# Patient Record
Sex: MALE | Race: WHITE | NOT HISPANIC OR LATINO
[De-identification: names, ages, dates, MRNs, and addresses within clinical notes are randomized per-mention and may not be internally consistent; named-entity substitution may affect disease eponyms.]

---

## 2016-11-07 RX ORDER — DILTIAZEM HCL 120 MG
1 CAPSULE, EXT RELEASE 24 HR ORAL
Qty: 120 | Refills: 0
Start: 2016-11-07 | End: 2017-10-12

## 2017-01-08 LAB — INR PPP: 1.9

## 2017-01-17 LAB — INR PPP: 1.82

## 2017-01-20 DIAGNOSIS — F32.9 MAJOR DEPRESSIVE DISORDER, SINGLE EPISODE, UNSPECIFIED: ICD-10-CM

## 2017-01-31 LAB — INR PPP: 2.89

## 2017-02-15 LAB — INR PPP: 2.51

## 2017-03-08 LAB — INR PPP: 3.1

## 2017-03-22 LAB — INR PPP: 2.28

## 2017-04-14 LAB — INR PPP: 1.88

## 2017-04-28 LAB — INR PPP: 2.47

## 2017-05-10 ENCOUNTER — MEDICATION RENEWAL (OUTPATIENT)
Age: 82
End: 2017-05-10

## 2017-05-19 LAB — INR PPP: 2.7

## 2017-05-26 ENCOUNTER — MEDICATION RENEWAL (OUTPATIENT)
Age: 82
End: 2017-05-26

## 2017-06-01 ENCOUNTER — APPOINTMENT (OUTPATIENT)
Dept: CARDIOLOGY | Facility: CLINIC | Age: 82
End: 2017-06-01

## 2017-06-01 VITALS — OXYGEN SATURATION: 100 % | SYSTOLIC BLOOD PRESSURE: 166 MMHG | HEART RATE: 76 BPM | DIASTOLIC BLOOD PRESSURE: 77 MMHG

## 2017-06-02 DIAGNOSIS — D64.9 ANEMIA, UNSPECIFIED: ICD-10-CM

## 2017-06-02 LAB
ALBUMIN SERPL ELPH-MCNC: 3.4 G/DL
ALP BLD-CCNC: 74 U/L
ALT SERPL-CCNC: 10 U/L
ANION GAP SERPL CALC-SCNC: 19 MMOL/L
AST SERPL-CCNC: 20 U/L
BASOPHILS # BLD AUTO: 0.03 K/UL
BASOPHILS NFR BLD AUTO: 0.5 %
BILIRUB SERPL-MCNC: 0.2 MG/DL
BUN SERPL-MCNC: 55 MG/DL
CALCIUM SERPL-MCNC: 8.9 MG/DL
CHLORIDE SERPL-SCNC: 101 MMOL/L
CHOLEST SERPL-MCNC: 208 MG/DL
CHOLEST/HDLC SERPL: 4.1 RATIO
CO2 SERPL-SCNC: 22 MMOL/L
CREAT SERPL-MCNC: 3.12 MG/DL
EOSINOPHIL # BLD AUTO: 0.22 K/UL
EOSINOPHIL NFR BLD AUTO: 3.6 %
GLUCOSE SERPL-MCNC: 77 MG/DL
HCT VFR BLD CALC: 31.6 %
HDLC SERPL-MCNC: 51 MG/DL
HGB BLD-MCNC: 10.3 G/DL
IMM GRANULOCYTES NFR BLD AUTO: 0.2 %
LDLC SERPL CALC-MCNC: 127 MG/DL
LYMPHOCYTES # BLD AUTO: 1.14 K/UL
LYMPHOCYTES NFR BLD AUTO: 18.8 %
MAN DIFF?: NORMAL
MCHC RBC-ENTMCNC: 32.6 GM/DL
MCHC RBC-ENTMCNC: 33 PG
MCV RBC AUTO: 101.3 FL
MONOCYTES # BLD AUTO: 0.4 K/UL
MONOCYTES NFR BLD AUTO: 6.6 %
NEUTROPHILS # BLD AUTO: 4.26 K/UL
NEUTROPHILS NFR BLD AUTO: 70.3 %
PLATELET # BLD AUTO: 228 K/UL
POTASSIUM SERPL-SCNC: 3.8 MMOL/L
PROT SERPL-MCNC: 6.6 G/DL
RBC # BLD: 3.12 M/UL
RBC # FLD: 13.2 %
SODIUM SERPL-SCNC: 142 MMOL/L
TRIGL SERPL-MCNC: 148 MG/DL
TSH SERPL-ACNC: 1.74 UIU/ML
WBC # FLD AUTO: 6.06 K/UL

## 2017-06-17 LAB — INR PPP: 2.34

## 2017-06-26 ENCOUNTER — APPOINTMENT (OUTPATIENT)
Dept: CARDIOLOGY | Facility: CLINIC | Age: 82
End: 2017-06-26

## 2017-07-13 LAB — INR PPP: 2.26

## 2017-07-18 ENCOUNTER — CHART COPY (OUTPATIENT)
Age: 82
End: 2017-07-18

## 2017-07-31 LAB — INR PPP: 2.94

## 2017-08-03 ENCOUNTER — MEDICATION RENEWAL (OUTPATIENT)
Age: 82
End: 2017-08-03

## 2017-08-30 ENCOUNTER — NON-APPOINTMENT (OUTPATIENT)
Age: 82
End: 2017-08-30

## 2017-08-30 ENCOUNTER — APPOINTMENT (OUTPATIENT)
Dept: CARDIOLOGY | Facility: CLINIC | Age: 82
End: 2017-08-30
Payer: MEDICARE

## 2017-08-30 VITALS
HEIGHT: 65 IN | BODY MASS INDEX: 21.49 KG/M2 | OXYGEN SATURATION: 95 % | WEIGHT: 129 LBS | DIASTOLIC BLOOD PRESSURE: 74 MMHG | SYSTOLIC BLOOD PRESSURE: 133 MMHG | HEART RATE: 77 BPM

## 2017-08-30 PROCEDURE — 99215 OFFICE O/P EST HI 40 MIN: CPT

## 2017-09-08 ENCOUNTER — EMERGENCY (EMERGENCY)
Facility: HOSPITAL | Age: 82
LOS: 1 days | Discharge: HOME | End: 2017-09-08
Attending: INTERNAL MEDICINE | Admitting: INTERNAL MEDICINE
Payer: COMMERCIAL

## 2017-09-08 ENCOUNTER — CHART COPY (OUTPATIENT)
Age: 82
End: 2017-09-08

## 2017-09-08 ENCOUNTER — INPATIENT (INPATIENT)
Facility: HOSPITAL | Age: 82
LOS: 3 days | Discharge: HOME | DRG: 694 | End: 2017-09-12
Attending: INTERNAL MEDICINE | Admitting: STUDENT IN AN ORGANIZED HEALTH CARE EDUCATION/TRAINING PROGRAM
Payer: COMMERCIAL

## 2017-09-08 VITALS
DIASTOLIC BLOOD PRESSURE: 54 MMHG | HEIGHT: 65 IN | WEIGHT: 125 LBS | TEMPERATURE: 98 F | HEART RATE: 67 BPM | SYSTOLIC BLOOD PRESSURE: 117 MMHG | RESPIRATION RATE: 12 BRPM | OXYGEN SATURATION: 97 %

## 2017-09-08 VITALS
WEIGHT: 123.02 LBS | DIASTOLIC BLOOD PRESSURE: 67 MMHG | TEMPERATURE: 98 F | RESPIRATION RATE: 12 BRPM | OXYGEN SATURATION: 99 % | HEART RATE: 90 BPM | SYSTOLIC BLOOD PRESSURE: 147 MMHG

## 2017-09-08 DIAGNOSIS — N39.0 URINARY TRACT INFECTION, SITE NOT SPECIFIED: ICD-10-CM

## 2017-09-08 DIAGNOSIS — I50.22 CHRONIC SYSTOLIC (CONGESTIVE) HEART FAILURE: ICD-10-CM

## 2017-09-08 DIAGNOSIS — R33.9 RETENTION OF URINE, UNSPECIFIED: ICD-10-CM

## 2017-09-08 DIAGNOSIS — Z85.51 PERSONAL HISTORY OF MALIGNANT NEOPLASM OF BLADDER: ICD-10-CM

## 2017-09-08 DIAGNOSIS — F32.9 MAJOR DEPRESSIVE DISORDER, SINGLE EPISODE, UNSPECIFIED: ICD-10-CM

## 2017-09-08 DIAGNOSIS — N28.9 DISORDER OF KIDNEY AND URETER, UNSPECIFIED: ICD-10-CM

## 2017-09-08 DIAGNOSIS — C61 MALIGNANT NEOPLASM OF PROSTATE: ICD-10-CM

## 2017-09-08 DIAGNOSIS — N40.1 BENIGN PROSTATIC HYPERPLASIA WITH LOWER URINARY TRACT SYMPTOMS: ICD-10-CM

## 2017-09-08 DIAGNOSIS — I48.91 UNSPECIFIED ATRIAL FIBRILLATION: ICD-10-CM

## 2017-09-08 DIAGNOSIS — Z29.9 ENCOUNTER FOR PROPHYLACTIC MEASURES, UNSPECIFIED: ICD-10-CM

## 2017-09-08 DIAGNOSIS — N13.30 UNSPECIFIED HYDRONEPHROSIS: ICD-10-CM

## 2017-09-08 DIAGNOSIS — I50.9 HEART FAILURE, UNSPECIFIED: ICD-10-CM

## 2017-09-08 DIAGNOSIS — I48.2 CHRONIC ATRIAL FIBRILLATION: ICD-10-CM

## 2017-09-08 DIAGNOSIS — I10 ESSENTIAL (PRIMARY) HYPERTENSION: ICD-10-CM

## 2017-09-08 DIAGNOSIS — N17.9 ACUTE KIDNEY FAILURE, UNSPECIFIED: ICD-10-CM

## 2017-09-08 DIAGNOSIS — Z85.46 PERSONAL HISTORY OF MALIGNANT NEOPLASM OF PROSTATE: ICD-10-CM

## 2017-09-08 DIAGNOSIS — Z79.01 LONG TERM (CURRENT) USE OF ANTICOAGULANTS: ICD-10-CM

## 2017-09-08 DIAGNOSIS — T45.511A POISONING BY ANTICOAGULANTS, ACCIDENTAL (UNINTENTIONAL), INITIAL ENCOUNTER: ICD-10-CM

## 2017-09-08 DIAGNOSIS — R33.8 OTHER RETENTION OF URINE: ICD-10-CM

## 2017-09-08 DIAGNOSIS — M10.9 GOUT, UNSPECIFIED: ICD-10-CM

## 2017-09-08 LAB
ALBUMIN SERPL ELPH-MCNC: 3.1 G/DL — LOW (ref 3.3–5)
ALP SERPL-CCNC: 81 U/L — SIGNIFICANT CHANGE UP (ref 40–120)
ALT FLD-CCNC: 15 U/L — SIGNIFICANT CHANGE UP (ref 12–78)
ANION GAP SERPL CALC-SCNC: 13 MMOL/L — SIGNIFICANT CHANGE UP (ref 5–17)
APPEARANCE UR: ABNORMAL
APTT BLD: 42.9 SEC — HIGH (ref 27.5–37.4)
AST SERPL-CCNC: 20 U/L — SIGNIFICANT CHANGE UP (ref 15–37)
BASOPHILS # BLD AUTO: 0.1 K/UL — SIGNIFICANT CHANGE UP (ref 0–0.2)
BASOPHILS NFR BLD AUTO: 0.6 % — SIGNIFICANT CHANGE UP (ref 0–2)
BILIRUB SERPL-MCNC: 0.4 MG/DL — SIGNIFICANT CHANGE UP (ref 0.2–1.2)
BILIRUB UR-MCNC: NEGATIVE — SIGNIFICANT CHANGE UP
BUN SERPL-MCNC: 85 MG/DL — HIGH (ref 7–23)
CALCIUM SERPL-MCNC: 8.7 MG/DL — SIGNIFICANT CHANGE UP (ref 8.5–10.1)
CHLORIDE SERPL-SCNC: 105 MMOL/L — SIGNIFICANT CHANGE UP (ref 96–108)
CO2 SERPL-SCNC: 21 MMOL/L — LOW (ref 22–31)
COLOR SPEC: YELLOW — SIGNIFICANT CHANGE UP
CREAT SERPL-MCNC: 4.5 MG/DL — HIGH (ref 0.5–1.3)
DIFF PNL FLD: ABNORMAL
EOSINOPHIL # BLD AUTO: 0 K/UL — SIGNIFICANT CHANGE UP (ref 0–0.5)
EOSINOPHIL NFR BLD AUTO: 0.3 % — SIGNIFICANT CHANGE UP (ref 0–6)
GLUCOSE SERPL-MCNC: 100 MG/DL — HIGH (ref 70–99)
GLUCOSE UR QL: NEGATIVE — SIGNIFICANT CHANGE UP
HCT VFR BLD CALC: 33.2 % — LOW (ref 39–50)
HGB BLD-MCNC: 11.6 G/DL — LOW (ref 13–17)
INR BLD: 3.58 RATIO — HIGH (ref 0.88–1.16)
INR PPP: 3.11
KETONES UR-MCNC: NEGATIVE — SIGNIFICANT CHANGE UP
LACTATE SERPL-SCNC: 1.1 MMOL/L — SIGNIFICANT CHANGE UP (ref 0.7–2)
LEUKOCYTE ESTERASE UR-ACNC: ABNORMAL
LYMPHOCYTES # BLD AUTO: 0.4 K/UL — LOW (ref 1–3.3)
LYMPHOCYTES # BLD AUTO: 4.3 % — LOW (ref 13–44)
MCHC RBC-ENTMCNC: 34.2 PG — HIGH (ref 27–34)
MCHC RBC-ENTMCNC: 34.9 GM/DL — SIGNIFICANT CHANGE UP (ref 32–36)
MCV RBC AUTO: 98 FL — SIGNIFICANT CHANGE UP (ref 80–100)
MONOCYTES # BLD AUTO: 0.7 K/UL — SIGNIFICANT CHANGE UP (ref 0–0.9)
MONOCYTES NFR BLD AUTO: 7.2 % — SIGNIFICANT CHANGE UP (ref 1–9)
NEUTROPHILS # BLD AUTO: 8.5 K/UL — HIGH (ref 1.8–7.4)
NEUTROPHILS NFR BLD AUTO: 87.6 % — HIGH (ref 43–77)
NITRITE UR-MCNC: NEGATIVE — SIGNIFICANT CHANGE UP
PH UR: 5 — SIGNIFICANT CHANGE UP (ref 5–8)
PLATELET # BLD AUTO: 227 K/UL — SIGNIFICANT CHANGE UP (ref 150–400)
POTASSIUM SERPL-MCNC: 4.4 MMOL/L — SIGNIFICANT CHANGE UP (ref 3.5–5.3)
POTASSIUM SERPL-SCNC: 4.4 MMOL/L — SIGNIFICANT CHANGE UP (ref 3.5–5.3)
PROT SERPL-MCNC: 6.7 G/DL — SIGNIFICANT CHANGE UP (ref 6–8.3)
PROT UR-MCNC: 150 MG/DL
PROTHROM AB SERPL-ACNC: 40 SEC — HIGH (ref 9.8–12.7)
RBC # BLD: 3.39 M/UL — LOW (ref 4.2–5.8)
RBC # FLD: 11.7 % — SIGNIFICANT CHANGE UP (ref 10.3–14.5)
SODIUM SERPL-SCNC: 139 MMOL/L — SIGNIFICANT CHANGE UP (ref 135–145)
SP GR SPEC: 1.01 — SIGNIFICANT CHANGE UP (ref 1.01–1.02)
UROBILINOGEN FLD QL: NEGATIVE — SIGNIFICANT CHANGE UP
WBC # BLD: 9.8 K/UL — SIGNIFICANT CHANGE UP (ref 3.8–10.5)
WBC # FLD AUTO: 9.8 K/UL — SIGNIFICANT CHANGE UP (ref 3.8–10.5)

## 2017-09-08 PROCEDURE — 99284 EMERGENCY DEPT VISIT MOD MDM: CPT | Mod: 25

## 2017-09-08 PROCEDURE — 99223 1ST HOSP IP/OBS HIGH 75: CPT

## 2017-09-08 PROCEDURE — 87086 URINE CULTURE/COLONY COUNT: CPT

## 2017-09-08 PROCEDURE — 99223 1ST HOSP IP/OBS HIGH 75: CPT | Mod: AI

## 2017-09-08 PROCEDURE — 99284 EMERGENCY DEPT VISIT MOD MDM: CPT

## 2017-09-08 PROCEDURE — 51702 INSERT TEMP BLADDER CATH: CPT

## 2017-09-08 PROCEDURE — 71045 X-RAY EXAM CHEST 1 VIEW: CPT

## 2017-09-08 PROCEDURE — 93010 ELECTROCARDIOGRAM REPORT: CPT

## 2017-09-08 PROCEDURE — 99283 EMERGENCY DEPT VISIT LOW MDM: CPT | Mod: 25

## 2017-09-08 PROCEDURE — 81001 URINALYSIS AUTO W/SCOPE: CPT

## 2017-09-08 PROCEDURE — 71010: CPT | Mod: 26

## 2017-09-08 RX ORDER — TAMSULOSIN HYDROCHLORIDE 0.4 MG/1
0.4 CAPSULE ORAL AT BEDTIME
Qty: 0 | Refills: 0 | Status: DISCONTINUED | OUTPATIENT
Start: 2017-09-08 | End: 2017-09-11

## 2017-09-08 RX ORDER — CEFUROXIME AXETIL 250 MG
500 TABLET ORAL ONCE
Qty: 0 | Refills: 0 | Status: COMPLETED | OUTPATIENT
Start: 2017-09-08 | End: 2017-09-08

## 2017-09-08 RX ORDER — METOPROLOL TARTRATE 50 MG
50 TABLET ORAL
Qty: 0 | Refills: 0 | Status: DISCONTINUED | OUTPATIENT
Start: 2017-09-08 | End: 2017-09-09

## 2017-09-08 RX ORDER — CEFUROXIME AXETIL 250 MG
1 TABLET ORAL
Qty: 14 | Refills: 0 | OUTPATIENT
Start: 2017-09-08 | End: 2017-09-15

## 2017-09-08 RX ORDER — SERTRALINE 25 MG/1
50 TABLET, FILM COATED ORAL DAILY
Qty: 0 | Refills: 0 | Status: DISCONTINUED | OUTPATIENT
Start: 2017-09-08 | End: 2017-09-12

## 2017-09-08 RX ORDER — ACETAMINOPHEN 500 MG
650 TABLET ORAL EVERY 6 HOURS
Qty: 0 | Refills: 0 | Status: DISCONTINUED | OUTPATIENT
Start: 2017-09-08 | End: 2017-09-12

## 2017-09-08 RX ORDER — CEFTRIAXONE 500 MG/1
1 INJECTION, POWDER, FOR SOLUTION INTRAMUSCULAR; INTRAVENOUS EVERY 24 HOURS
Qty: 0 | Refills: 0 | Status: DISCONTINUED | OUTPATIENT
Start: 2017-09-08 | End: 2017-09-12

## 2017-09-08 RX ADMIN — Medication 50 MILLIGRAM(S): at 18:33

## 2017-09-08 RX ADMIN — Medication 500 MILLIGRAM(S): at 13:21

## 2017-09-08 RX ADMIN — TAMSULOSIN HYDROCHLORIDE 0.4 MILLIGRAM(S): 0.4 CAPSULE ORAL at 21:18

## 2017-09-08 RX ADMIN — CEFTRIAXONE 100 GRAM(S): 500 INJECTION, POWDER, FOR SOLUTION INTRAMUSCULAR; INTRAVENOUS at 17:20

## 2017-09-08 NOTE — H&P ADULT - NSHPSOCIALHISTORY_GEN_ALL_CORE
no tobacco, no etoh, no recreational drug use  ambulates w/ assistance in the home and has chronic gait instability  lives w/ spouse

## 2017-09-08 NOTE — PATIENT PROFILE ADULT. - ABILITY TO HEAR (WITH HEARING AID OR HEARING APPLIANCE IF NORMALLY USED):
Mildly to Moderately Impaired: difficulty hearing in some environments or speaker may need to increase volume or speak distinctly/bilateral hearing aids used not currently with pt

## 2017-09-08 NOTE — H&P ADULT - PROBLEM SELECTOR PLAN 1
admit medicine  cardio, Mary consulted by the ED  uroKem consulted by the ED  no supplemental IVF  obtain abd flat plate xr  f/u consultant recs  continue tamsulin  vega inserted, monitor urine output  monior bun/cre  avoid nephrotoxic agents

## 2017-09-08 NOTE — ED ADULT NURSE NOTE - OBJECTIVE STATEMENT
Seen here earlier for retention vega placed and abx given for uti/ called by by MD for admission for renal

## 2017-09-08 NOTE — ED PROVIDER NOTE - PMH
Acute congestive heart failure  12/2010  Atrial Fibrillation  1990  Bladder neoplasm    BPH (Benign Prostatic Hypertrophy)    Congestive heart failure  hospitalized  once 12/2010  Depression    Gout    [place] (hard of hearing)    Hyperlipidemia    Hypertension    Prostate cancer  9/2011 - s/p external radiation treatments - last 12/9/11, now on Bicalvutamide

## 2017-09-08 NOTE — H&P ADULT - ATTENDING COMMENTS
pt has previously elected for comfort care measure and DNR/DNI. However, the pt was hospitalized recently and had a syncopal episode. RRT was called and the wife believes that this saved the pt's life. She wishes for full code if necessary and the pt agrees. Counseling provided. Pt elects for full aggressive medical therapy and full code.     The admission plan was discussed in detail with the patient and family members at the bedside. Their questions and concerns were addressed to the best of my ability. They are in agreement with the plan as detailed above. They demonstrated adequate understanding of the counseling which I have provided.

## 2017-09-08 NOTE — PATIENT PROFILE ADULT. - VISION (WITH CORRECTIVE LENSES IF THE PATIENT USUALLY WEARS THEM):
Partially impaired: cannot see medication labels or newsprint, but can see obstacles in path, and the surrounding layout; can count fingers at arm's length/reading glasses, not currently with pt

## 2017-09-08 NOTE — ED ADULT NURSE NOTE - PMH
Acute congestive heart failure  12/2010  Atrial Fibrillation  1990  Bladder neoplasm    BPH (Benign Prostatic Hypertrophy)    Congestive heart failure  hospitalized  once 12/2010  Depression    Gout    [place] (hard of hearing)    Hyperlipidemia    Hypertension    Prostate cancer  9/2011 - s/p external radiation treatments - last 12/9/11, now on Bicalvutamide

## 2017-09-08 NOTE — PATIENT PROFILE ADULT. - NS PRO INDICAT FLU
Patient is 18 years or older/Patient is 18 to 64 years of age with chronic diseases or conditions/Patient/surrogate requesting vaccine

## 2017-09-08 NOTE — ED PROVIDER NOTE - MEDICAL DECISION MAKING DETAILS
urologist and pmd request that pt be admitted ...acute urinary retention in face of acute renal insuff.

## 2017-09-08 NOTE — H&P ADULT - PMH
Acute congestive heart failure  12/2010  Atrial Fibrillation  1990  Bladder neoplasm    Congestive heart failure  hospitalized  once 12/2010  Depression    Gout    [place] (hard of hearing)    Hyperlipidemia    Hypertension    Prostate cancer  9/2011 - s/p external radiation treatments - last 12/9/11, now on Bicalvutamide

## 2017-09-08 NOTE — ED PROVIDER NOTE - PMH
Acute congestive heart failure  12/2010  Atrial Fibrillation  1990  Bladder neoplasm    BPH (Benign Prostatic Hypertrophy)    Congestive heart failure  hospitalized  once 12/2010  Depression    Gout    [place] (hard of hearing)    Hyperlipidemia    Hypertension    Prostate cancer  9/2011 - s/p external radiation treatments - last 12/9/11, now on Bicalvutamide

## 2017-09-08 NOTE — H&P ADULT - NSHPPHYSICALEXAM_GEN_ALL_CORE
T(C): 36.4 (09-08-17 @ 15:40), Max: 36.7 (09-08-17 @ 12:18)  HR: 88 (09-08-17 @ 18:05) (67 - 90)  BP: 142/62 (09-08-17 @ 18:05) (117/54 - 147/67)  RR: 16 (09-08-17 @ 18:05) (12 - 16)  SpO2: 99% (09-08-17 @ 18:05) (97% - 99%)    GENERAL: patient appears thin/frail, younger than stated age  EYES: sclera clear, no exudates  ENMT: oropharynx clear without erythema, no exudates  NECK: supple, soft, no thyromegaly noted  LUNGS: reduced air entry b/l, no wheezing  HEART: soft S1/S2, irregular rhythm  GASTROINTESTINAL: abdomen is soft, nontender, nondistended, normoactive bowel sounds, no palpable masses  INTEGUMENT: good skin turgor, no lesions noted  MUSCULOSKELETAL: no clubbing or cyanosis, no obvious deformity  NEUROLOGIC: awake, alert, oriented x3, good muscle tone in 4 extremities, no obvious sensory deficits  PSYCHIATRIC: mood is good, affect is congruent, linear and logical thought process  HEME/LYMPH: no palpable supraclavicular nodules, no obvious ecchymosis or petechiae

## 2017-09-08 NOTE — ED PROVIDER NOTE - OBJECTIVE STATEMENT
89 yo wm seen few hrs ago by me and james'd to home after he had vega placed for urinary retention.pt also had uti and was rxd with ab.his urologist then came to er and asked that pt be called back to er for admission because of renal insufficiency.

## 2017-09-08 NOTE — ED PROVIDER NOTE - OBJECTIVE STATEMENT
89 yo wm, poor historian,with chronic problems voiding.needed to be catheterized in er today and noted to have urinary retention with at least 500 cc of cloudy urine.denies fever,chills,abdom pain or back pain.h/o prostate ca(unclear as to rx),af on coumadin.denies hematuria.

## 2017-09-08 NOTE — CONSULT NOTE ADULT - SUBJECTIVE AND OBJECTIVE BOX
[facility]         Alejandro Hernandez, Sandip, Alvaro, Jasmina, Brannon Negrete        [phone] (office)    CHIEF COMPLAINT: Patient is a 89y old  Male who presents with a chief complaint of     HPI: Patient is a 89 yo male with pmhx of CHF, afib, HTN, HLD, BPH, bladder neoplasm, prostate cancer s/p radiation, depression, gout presented to ED due to urinary retention. Patient was sent by Dr. Hernandez to see Dr. Brownlee due to UTI, acute renal failure, hydronephrosis from urinary retention. Patient was seen and evaluated at bedside. Stated that he was originally sent home earlier today from the ER after vega placement but was told to come back. Currently patient denies any cardiac complaints; denies headache, dizziness, chest pain, palpitations, SOB, ASHBY, nausea, or vomiting. Patient currently on warfarin for his afib. Denies any hx of MI or CVA. Patient reports that he usually is able to ambulate around the house without SOB.       PAST MEDICAL & SURGICAL HISTORY:  [place] (hard of hearing)  Bladder neoplasm  Congestive heart failure: hospitalized  once 12/2010  Acute congestive heart failure: 12/2010  Prostate cancer: 9/2011 - s/p external radiation treatments - last 12/9/11, now on Bicalvutamide  Gout  Depression  Hypertension  Hyperlipidemia  Atrial Fibrillation: 1990  Circumcision: age 5  History of Arthroscopy of Knee: left  S/P Cataract Extraction: LEFT  S/P Tonsillectomy      SOCIAL HISTORY: No active tobacco, alcohol or illicit drug use    FAMILY HISTORY:  No pertinent family history in first degree relatives    Outpatient medications:  - warfarin   - metoprolol  - diltiazem  - furosemide  - hydralazine  - sertraline    MEDICATIONS  (STANDING):  cefTRIAXone   IVPB 1 Gram(s) IV Intermittent every 24 hours  tamsulosin 0.4 milliGRAM(s) Oral at bedtime    MEDICATIONS  (PRN):      Allergies    Hay Fever (Rhinorrhea)  No Known Drug Allergies    Intolerances    REVIEW OF SYSTEMS:  Constitution: denies fever, chills, fatigue  HEENT; denies visual changes, headache, throat pain  Heart; denies chest pain, palpitations  Lungs; denies SOB, ASHBY, or cough  GI: denies abdominal pain, nausea, or vomiting  : +urinary retention  Ext: denies pain or edema    VITAL SIGNS:   Vital Signs Last 24 Hrs  T(C): 36.4 (08 Sep 2017 15:40), Max: 36.7 (08 Sep 2017 12:18)  T(F): 97.5 (08 Sep 2017 15:40), Max: 98.1 (08 Sep 2017 12:18)  HR: 90 (08 Sep 2017 15:40) (67 - 90)  BP: 147/67 (08 Sep 2017 15:40) (117/54 - 147/67)  BP(mean): --  RR: 12 (08 Sep 2017 15:40) (12 - 12)  SpO2: 99% (08 Sep 2017 15:40) (97% - 99%)    I&O's Summary      PHYSICAL EXAM:    Constitutional: NAD, awake and alert, well-developed  Eyes:  EOMI, no oral cyanosis, conjunctivae clear, anicteric.  Pulmonary: Non-labored, +mild coarse breath sounds b/l Lower lobes  Cardiovascular: +distant heart sound, no murmur, no rubs, gallops or clicks  Gastrointestinal: Bowel Sounds present, soft, nontender.   Lymph: No peripheral edema.   Neurological: Alert, strength and sensitivity are grossly intact  Skin: No obvious lesions/rashes.   Psych:  Mood & affect appropriate .        RADIOLOGY:  < from: Xray Chest 1 View AP-PORTABLE IMMEDIATE (09.08.17 @ 16:26) >  EXAM:  CHEST PORTABLE IMMED                            PROCEDURE DATE:  09/08/2017          INTERPRETATION:  Clinical information: Difficulty voiding, abnormal chest   sounds.    AP view the chest is compared to 11/4/2016.    Impression: The cardiac and mediastinal silhouettes are normal. The lungs   are clear. The pulmonary edema on the previous study has resolved. Bony   degenerative changes are stable.      JAYSHREE BRUNO M.D., ATTENDING RADIOLOGIST  This document has been electronically signed. Sep  8 2017  4:30PM          < end of copied text >
CHIEF COMPLAINT: urinary retention    HISTORY OF PRESENT ILLNESS: 89 y/o pt with known CHF wasnoted by Dr Hernandez to have elevated Creatinine of 4. Last creatinine in my office in  was 1.7. An outside renal sono showed retention and bilateral hydro. Pt came to er and was found on catheterization to have 550 cc in bladder. Labs are pending.   Pt has hx of suraj 9 (T2b) Prostate cancer treated with RT in 2011. Last PSA in our office was 2.5 9/15, and stable.  Pt has hx of T1 TCCa of bladder, last cysto in our office was 9/15  The pt has not f/u as directed since . He has a hx of retention and arf  a few years ago.    PAST MEDICAL & SURGICAL HISTORY:  [place] (hard of hearing)  Bladder neoplasm  Congestive heart failure: hospitalized  once 2010  Acute congestive heart failure: 2010  Prostate cancer: 2011 - s/p external radiation treatments - last 11, now on Bicalvutamide  Gout  Depression  BPH (Benign Prostatic Hypertrophy)  Hypertension  Hyperlipidemia  Atrial Fibrillation:   Circumcision: age 5  History of Arthroscopy of Knee: left  S/P Cataract Extraction: LEFT  S/P Tonsillectomy      REVIEW OF SYSTEMS:    CONSTITUTIONAL: No weakness, fevers or chills  EYES/ENT: No visual changes;  No vertigo or throat pain   NECK: No pain or stiffness  RESPIRATORY: No cough, wheezing, hemoptysis.  CARDIOVASCULAR: No chest pain or palpitations  GASTROINTESTINAL: No abdominal or epigastric pain. No nausea, vomiting, or hematemesis; No diarrhea or constipation. No melena or hematochezia.  GENITOURINARY: No dysuria, frequency or hematuria  NEUROLOGICAL: No numbness or weakness  SKIN: No itching, burning, rashes, or lesions   All other review of systems is negative unless indicated above.    MEDICATIONS  (STANDING):    MEDICATIONS  (PRN):      Allergies    Hay Fever (Rhinorrhea)  No Known Drug Allergies    Intolerances        SOCIAL HISTORY:    FAMILY HISTORY:  No pertinent family history in first degree relatives      Vital Signs Last 24 Hrs  T(C): 36.4 (08 Sep 2017 15:40), Max: 36.7 (08 Sep 2017 12:18)  T(F): 97.5 (08 Sep 2017 15:40), Max: 98.1 (08 Sep 2017 12:18)  HR: 90 (08 Sep 2017 15:40) (67 - 90)  BP: 147/67 (08 Sep 2017 15:40) (117/54 - 147/67)  BP(mean): --  RR: 12 (08 Sep 2017 15:40) (12 - 12)  SpO2: 99% (08 Sep 2017 15:40) (97% - 99%)    PHYSICAL EXAM:    Constitutional: NAD, well-developed  HEENT: JOSSELYN, EOMI, Normal Hearing, MMM  Neck: supple  Back: Normal spine flexure, No CVA tenderness  Respiratory: Not using accessory muscles   Abd: BS+, soft, NT/ND, No CVAT  : Normal phallus,open meatus,bilateral descended testes, no masses, indwelling vega draining tinged urine.  AARON: 1 firm, without nodules  Vascular: 2+ peripheral pulses  Neurological: A/O x 3, no focal deficits  Psychiatric: Normal mood, normal affect  Musculoskeletal: 5/5 strength b/l upper and lower extremities  Skin: No rashes    LABS:            Urinalysis Basic - ( 08 Sep 2017 12:51 )    Color: Yellow / Appearance: Turbid / S.015 / pH: x  Gluc: x / Ketone: Negative  / Bili: Negative / Urobili: Negative   Blood: x / Protein: 150 mg/dL / Nitrite: Negative   Leuk Esterase: Moderate / RBC: 6-10 /HPF / WBC >50   Sq Epi: x / Non Sq Epi: Few / Bacteria: Few      Urine Culture:     RADIOLOGY & ADDITIONAL STUDIES:

## 2017-09-08 NOTE — H&P ADULT - HISTORY OF PRESENT ILLNESS
89yo M with PMHx of CHF, afib on warfarin, HTN, HLD, BPH, bladder neoplasm, prostate cancer s/p radiation, depression, gout presented to ED due to urinary retention. Patient was sent by Dr. Hernandez to see Dr. Brownlee due to UTI, acute renal failure, hydronephrosis from urinary retention. Patient was seen and evaluated at bedside. Stated that he was originally sent home earlier today from the ER after vega placement but was told to come back. Currently patient denies any cardiac complaints; denies headache, dizziness, chest pain, palpitations, SOB, ASHBY, nausea, or vomiting. Patient currently on warfarin for his afib. Denies any hx of MI or CVA. Patient reports that he usually is able to ambulate around the house without SOB and his current exercise tolerance is at baseline. No other acute complaints today.     in the ED, pt received vega and was given 1 dose of ceftriaxone

## 2017-09-08 NOTE — ED ADULT NURSE NOTE - PSH
Circumcision  age 5  History of Arthroscopy of Knee  left  S/P Cataract Extraction  LEFT  S/P Tonsillectomy

## 2017-09-08 NOTE — CONSULT NOTE ADULT - ASSESSMENT
Patient is a 89 yo male with pmhx of CHF, afib, HTN, HLD, BPH, bladder neoplasm, prostate cancer s/p radiation, depression, gout presented to ED due to urinary retention    - Patient presents with urinary retention, workup and plans per urology & primary team  - due to patient's hx of chf, need to monitor I&Os closely  - hx of afib on warfarin, f/u on INR level for medication management  - monitor vitals Patient is a 89 yo male with pmhx of CHF, afib, HTN, HLD, BPH, bladder neoplasm, prostate cancer s/p radiation, depression, gout presented to ED due to urinary retention    - Patient presents with urinary retention, on vega, workup and plans per urology & primary team  - due to patient's hx of chf, need to monitor I&Os closely  - hx of afib on warfarin, f/u on INR level for medication management  - monitor vitals   - f/u labs   - abx for uti per primary team Patient is a 89 yo male with pmhx of CHF, afib, HTN, HLD, BPH, bladder neoplasm, prostate cancer s/p radiation, depression, gout presented to ED due to urinary retention    - Patient presents with urinary retention, on vega, workup and plans per urology & primary team  - due to patient's hx of chf, need to monitor I&Os closely  - hx of afib on warfarin, f/u on INR level for medication management  - hold lasix for now   - continue diltiazem 30mg, metoprolol tartrate 50mg, hydralazine  - monitor vitals   - f/u labs   - abx for uti per primary team Patient is a 89 yo male with pmhx of CHF, afib, HTN, HLD, BPH, bladder neoplasm, prostate cancer s/p radiation, depression, gout presented to ED due to urinary retention    - Patient presents with urinary retention, on vega, workup and plans per urology & primary team  - due to patient's hx of chf, need to monitor I&Os closely  - hx of afib on warfarin, f/u on INR level for medication management.  Dose for INR 2-3  - hold lasix for now   - continue diltiazem 120mg QD, metoprolol succinate 50 BID, and hydralazine 25 BID  - monitor vitals   - f/u labs   - abx for uti per primary team

## 2017-09-08 NOTE — PATIENT PROFILE ADULT. - NS PRO CONTRA FLU 1
will endorse to nurse in am to speak to pt's wife regarding flu shot/unable to assess immunization status no/will endorse to nurse in am to speak to pt's wife regarding flu shot

## 2017-09-08 NOTE — H&P ADULT - ASSESSMENT
89yo M with PMHx of CHF, afib on warfarin, HTN, HLD, BPH, bladder neoplasm, prostate cancer s/p radiation, depression, gout presented to ED due to urinary retention and KAILEE on CKD

## 2017-09-09 LAB
ANION GAP SERPL CALC-SCNC: 13 MMOL/L — SIGNIFICANT CHANGE UP (ref 5–17)
APTT BLD: 40.4 SEC — HIGH (ref 27.5–37.4)
BUN SERPL-MCNC: 83 MG/DL — HIGH (ref 7–23)
CALCIUM SERPL-MCNC: 8.6 MG/DL — SIGNIFICANT CHANGE UP (ref 8.5–10.1)
CHLORIDE SERPL-SCNC: 105 MMOL/L — SIGNIFICANT CHANGE UP (ref 96–108)
CO2 SERPL-SCNC: 23 MMOL/L — SIGNIFICANT CHANGE UP (ref 22–31)
CREAT SERPL-MCNC: 4.5 MG/DL — HIGH (ref 0.5–1.3)
CULTURE RESULTS: NO GROWTH — SIGNIFICANT CHANGE UP
GLUCOSE SERPL-MCNC: 87 MG/DL — SIGNIFICANT CHANGE UP (ref 70–99)
HCT VFR BLD CALC: 29.2 % — LOW (ref 39–50)
HGB BLD-MCNC: 10.2 G/DL — LOW (ref 13–17)
INR BLD: 3.65 RATIO — HIGH (ref 0.88–1.16)
MAGNESIUM SERPL-MCNC: 2.6 MG/DL — SIGNIFICANT CHANGE UP (ref 1.6–2.6)
MCHC RBC-ENTMCNC: 34.1 PG — HIGH (ref 27–34)
MCHC RBC-ENTMCNC: 34.8 GM/DL — SIGNIFICANT CHANGE UP (ref 32–36)
MCV RBC AUTO: 97.9 FL — SIGNIFICANT CHANGE UP (ref 80–100)
PLATELET # BLD AUTO: 224 K/UL — SIGNIFICANT CHANGE UP (ref 150–400)
POTASSIUM SERPL-MCNC: 3.7 MMOL/L — SIGNIFICANT CHANGE UP (ref 3.5–5.3)
POTASSIUM SERPL-SCNC: 3.7 MMOL/L — SIGNIFICANT CHANGE UP (ref 3.5–5.3)
PROTHROM AB SERPL-ACNC: 40.9 SEC — HIGH (ref 9.8–12.7)
PSA FLD-MCNC: 6.99 NG/ML — HIGH (ref 0–4)
RBC # BLD: 2.98 M/UL — LOW (ref 4.2–5.8)
RBC # FLD: 11.7 % — SIGNIFICANT CHANGE UP (ref 10.3–14.5)
SODIUM SERPL-SCNC: 141 MMOL/L — SIGNIFICANT CHANGE UP (ref 135–145)
SPECIMEN SOURCE: SIGNIFICANT CHANGE UP
WBC # BLD: 6.5 K/UL — SIGNIFICANT CHANGE UP (ref 3.8–10.5)
WBC # FLD AUTO: 6.5 K/UL — SIGNIFICANT CHANGE UP (ref 3.8–10.5)

## 2017-09-09 PROCEDURE — 99233 SBSQ HOSP IP/OBS HIGH 50: CPT

## 2017-09-09 RX ORDER — SODIUM CHLORIDE 9 MG/ML
500 INJECTION INTRAMUSCULAR; INTRAVENOUS; SUBCUTANEOUS ONCE
Qty: 0 | Refills: 0 | Status: COMPLETED | OUTPATIENT
Start: 2017-09-09 | End: 2017-09-09

## 2017-09-09 RX ORDER — METOPROLOL TARTRATE 50 MG
25 TABLET ORAL
Qty: 0 | Refills: 0 | Status: DISCONTINUED | OUTPATIENT
Start: 2017-09-09 | End: 2017-09-10

## 2017-09-09 RX ORDER — HYDRALAZINE HCL 50 MG
25 TABLET ORAL
Qty: 0 | Refills: 0 | Status: DISCONTINUED | OUTPATIENT
Start: 2017-09-09 | End: 2017-09-10

## 2017-09-09 RX ADMIN — Medication 50 MILLIGRAM(S): at 12:01

## 2017-09-09 RX ADMIN — SODIUM CHLORIDE 1000 MILLILITER(S): 9 INJECTION INTRAMUSCULAR; INTRAVENOUS; SUBCUTANEOUS at 05:20

## 2017-09-09 RX ADMIN — TAMSULOSIN HYDROCHLORIDE 0.4 MILLIGRAM(S): 0.4 CAPSULE ORAL at 21:31

## 2017-09-09 RX ADMIN — SERTRALINE 50 MILLIGRAM(S): 25 TABLET, FILM COATED ORAL at 12:01

## 2017-09-09 NOTE — PROGRESS NOTE ADULT - SUBJECTIVE AND OBJECTIVE BOX
Patient is a 89y old  Male who presents with a chief complaint of urinary retention (08 Sep 2017 17:07)      INTERVAL HPI/OVERNIGHT EVENTS:    MEDICATIONS  (STANDING):  cefTRIAXone   IVPB 1 Gram(s) IV Intermittent every 24 hours  tamsulosin 0.4 milliGRAM(s) Oral at bedtime  diltiazem    Tablet 30 milliGRAM(s) Oral every 6 hours  sertraline 50 milliGRAM(s) Oral daily  metoprolol 25 milliGRAM(s) Oral four times a day  hydrALAZINE 25 milliGRAM(s) Oral two times a day    MEDICATIONS  (PRN):  acetaminophen   Tablet. 650 milliGRAM(s) Oral every 6 hours PRN Mild Pain (1 - 3)      Allergies    Hay Fever (Rhinorrhea)  No Known Drug Allergies    Intolerances        REVIEW OF SYSTEMS:  CONSTITUTIONAL: No fever, weight loss, or fatigue  EYES: No eye pain, visual disturbances, or discharge  ENMT:  No difficulty hearing, tinnitus, vertigo; No sinus or throat pain  NECK: No pain or stiffness  BREASTS: No pain, masses, or nipple discharge  RESPIRATORY: No cough, wheezing, chills or hemoptysis; No shortness of breath  CARDIOVASCULAR: No chest pain, palpitations, dizziness, or leg swelling  GASTROINTESTINAL: No abdominal or epigastric pain. No nausea, vomiting, or hematemesis; No diarrhea or constipation. No melena or hematochezia.  GENITOURINARY: No dysuria, frequency, hematuria, or incontinence  NEUROLOGICAL: No headaches, memory loss, loss of strength, numbness, or tremors  SKIN: No itching, burning, rashes, or lesions   LYMPH NODES: No enlarged glands  ENDOCRINE: No heat or cold intolerance; No hair loss; No polydipsia or polyuria  MUSCULOSKELETAL: No joint pain or swelling; No muscle, back, or extremity pain  PSYCHIATRIC: No depression, anxiety, mood swings, or difficulty sleeping  HEME/LYMPH: No easy bruising, or bleeding gums  ALLERGY AND IMMUNOLOGIC: No hives or eczema    Vital Signs Last 24 Hrs  T(C): 36.3 (09 Sep 2017 13:49), Max: 36.9 (09 Sep 2017 00:00)  T(F): 97.3 (09 Sep 2017 13:49), Max: 98.4 (09 Sep 2017 00:00)  HR: 82 (09 Sep 2017 13:49) (71 - 93)  BP: 104/68 (09 Sep 2017 13:49) (79/54 - 169/62)  BP(mean): --  RR: 15 (09 Sep 2017 13:49) (12 - 17)  SpO2: 100% (09 Sep 2017 13:49) (99% - 100%)    PHYSICAL EXAM:  GENERAL: NAD, well-groomed, well-developed  HEAD:  Atraumatic, Normocephalic  EYES: EOMI, PERRLA, conjunctiva and sclera clear  ENMT: No tonsillar erythema, exudates, or enlargement; Moist mucous membranes, Good dentition, No lesions  NECK: Supple, No JVD, Normal thyroid  NERVOUS SYSTEM:  Alert & Oriented X3, Good concentration; Motor Strength 5/5 B/L upper and lower extremities; DTRs 2+ intact and symmetric  CHEST/LUNG: Clear to auscultation bilaterally; No rales, rhonchi, wheezing, or rubs  HEART: Regular rate and rhythm; No murmurs, rubs, or gallops  ABDOMEN: Soft, Nontender, Nondistended; Bowel sounds present  EXTREMITIES:  2+ Peripheral Pulses, No clubbing, cyanosis, or edema  LYMPH: No lymphadenopathy noted  SKIN: No rashes or lesions    LABS:                        10.2   6.5   )-----------( 224      ( 09 Sep 2017 05:43 )             29.2     09 Sep 2017 05:43    141    |  105    |  83     ----------------------------<  87     3.7     |  23     |  4.50     Ca    8.6        09 Sep 2017 05:43  Mg     2.6       09 Sep 2017 05:43    TPro  6.7    /  Alb  3.1    /  TBili  0.4    /  DBili  x      /  AST  20     /  ALT  15     /  AlkPhos  81     08 Sep 2017 17:33    PT/INR - ( 09 Sep 2017 05:43 )   PT: 40.9 sec;   INR: 3.65 ratio         PTT - ( 09 Sep 2017 05:43 )  PTT:40.4 sec  Urinalysis Basic - ( 08 Sep 2017 12:51 )    Color: Yellow / Appearance: Turbid / S.015 / pH: x  Gluc: x / Ketone: Negative  / Bili: Negative / Urobili: Negative   Blood: x / Protein: 150 mg/dL / Nitrite: Negative   Leuk Esterase: Moderate / RBC: 6-10 /HPF / WBC >50   Sq Epi: x / Non Sq Epi: Few / Bacteria: Few          CAPILLARY BLOOD GLUCOSE          RADIOLOGY & ADDITIONAL TESTS:    Imaging Personally Reviewed:  [ ] YES     Consultant(s) Notes Reviewed:      Care Discussed with Consultants/Other Providers:

## 2017-09-09 NOTE — PROGRESS NOTE ADULT - PROBLEM SELECTOR PLAN 1
admit medicine  cardio, Mary consult appreciated  uro, Kem consult appreciated.   no supplemental IVF  obtain abd flat plate xr  f/u consultant recs  continue tamsulin  vega inserted, monitor urine output  monior bun/cre  avoid nephrotoxic agents admit medicine  cardio, Mary consult appreciated  uroKem consult appreciated.   continue with tamsulin   monitor urine output and renal function closely.   avoid nephrotoxic agents

## 2017-09-09 NOTE — PROGRESS NOTE ADULT - SUBJECTIVE AND OBJECTIVE BOX
Patient is a 89y old  Male who presents with a chief complaint of urinary retention (08 Sep 2017 17:07)      INTERVAL HPI/OVERNIGHT EVENTS:    MEDICATIONS  (STANDING):  cefTRIAXone   IVPB 1 Gram(s) IV Intermittent every 24 hours  tamsulosin 0.4 milliGRAM(s) Oral at bedtime  diltiazem    Tablet 30 milliGRAM(s) Oral every 6 hours  sertraline 50 milliGRAM(s) Oral daily  metoprolol 25 milliGRAM(s) Oral four times a day  hydrALAZINE 25 milliGRAM(s) Oral two times a day    MEDICATIONS  (PRN):  acetaminophen   Tablet. 650 milliGRAM(s) Oral every 6 hours PRN Mild Pain (1 - 3)      Allergies    Hay Fever (Rhinorrhea)  No Known Drug Allergies    Intolerances        REVIEW OF SYSTEMS:  CONSTITUTIONAL: No fever, weight loss, or fatigue  EYES: No eye pain, visual disturbances, or discharge  ENMT:  No difficulty hearing, tinnitus, vertigo; No sinus or throat pain  NECK: No pain or stiffness  BREASTS: No pain, masses, or nipple discharge  RESPIRATORY: No cough, wheezing, chills or hemoptysis; No shortness of breath  CARDIOVASCULAR: No chest pain, palpitations, dizziness, or leg swelling  GASTROINTESTINAL: No abdominal or epigastric pain. No nausea, vomiting, or hematemesis; No diarrhea or constipation. No melena or hematochezia.  GENITOURINARY: No dysuria, frequency, hematuria, or incontinence  NEUROLOGICAL: No headaches, memory loss, loss of strength, numbness, or tremors  SKIN: No itching, burning, rashes, or lesions   LYMPH NODES: No enlarged glands  ENDOCRINE: No heat or cold intolerance; No hair loss; No polydipsia or polyuria  MUSCULOSKELETAL: No joint pain or swelling; No muscle, back, or extremity pain  PSYCHIATRIC: No depression, anxiety, mood swings, or difficulty sleeping  HEME/LYMPH: No easy bruising, or bleeding gums  ALLERGY AND IMMUNOLOGIC: No hives or eczema    Vital Signs Last 24 Hrs  T(C): 36.3 (09 Sep 2017 13:49), Max: 36.9 (09 Sep 2017 00:00)  T(F): 97.3 (09 Sep 2017 13:49), Max: 98.4 (09 Sep 2017 00:00)  HR: 82 (09 Sep 2017 13:49) (71 - 93)  BP: 104/68 (09 Sep 2017 13:49) (79/54 - 169/62)  BP(mean): --  RR: 15 (09 Sep 2017 13:49) (12 - 17)  SpO2: 100% (09 Sep 2017 13:49) (99% - 100%)    PHYSICAL EXAM:  GENERAL: NAD, well-groomed, well-developed  HEAD:  Atraumatic, Normocephalic  EYES: EOMI, PERRLA, conjunctiva and sclera clear  ENMT: No tonsillar erythema, exudates, or enlargement; Moist mucous membranes, Good dentition, No lesions  NECK: Supple, No JVD, Normal thyroid  NERVOUS SYSTEM:  Alert & Oriented X3, Good concentration; Motor Strength 5/5 B/L upper and lower extremities; DTRs 2+ intact and symmetric  CHEST/LUNG: Clear to auscultation bilaterally; No rales, rhonchi, wheezing, or rubs  HEART: Regular rate and rhythm; No murmurs, rubs, or gallops  ABDOMEN: Soft, Nontender, Nondistended; Bowel sounds present  EXTREMITIES:  2+ Peripheral Pulses, No clubbing, cyanosis, or edema  LYMPH: No lymphadenopathy noted  SKIN: No rashes or lesions    LABS:                        10.2   6.5   )-----------( 224      ( 09 Sep 2017 05:43 )             29.2     09 Sep 2017 05:43    141    |  105    |  83     ----------------------------<  87     3.7     |  23     |  4.50     Ca    8.6        09 Sep 2017 05:43  Mg     2.6       09 Sep 2017 05:43    TPro  6.7    /  Alb  3.1    /  TBili  0.4    /  DBili  x      /  AST  20     /  ALT  15     /  AlkPhos  81     08 Sep 2017 17:33    PT/INR - ( 09 Sep 2017 05:43 )   PT: 40.9 sec;   INR: 3.65 ratio         PTT - ( 09 Sep 2017 05:43 )  PTT:40.4 sec  Urinalysis Basic - ( 08 Sep 2017 12:51 )    Color: Yellow / Appearance: Turbid / S.015 / pH: x  Gluc: x / Ketone: Negative  / Bili: Negative / Urobili: Negative   Blood: x / Protein: 150 mg/dL / Nitrite: Negative   Leuk Esterase: Moderate / RBC: 6-10 /HPF / WBC >50   Sq Epi: x / Non Sq Epi: Few / Bacteria: Few          CAPILLARY BLOOD GLUCOSE          RADIOLOGY & ADDITIONAL TESTS:    Imaging Personally Reviewed:  [ ] YES     Consultant(s) Notes Reviewed:      Care Discussed with Consultants/Other Providers: Patient is a 89y old  Male who presents with a chief complaint of urinary retention (08 Sep 2017 17:07)      INTERVAL HPI/OVERNIGHT EVENTS: no acute overnight events.     MEDICATIONS  (STANDING):  cefTRIAXone   IVPB 1 Gram(s) IV Intermittent every 24 hours  tamsulosin 0.4 milliGRAM(s) Oral at bedtime  diltiazem    Tablet 30 milliGRAM(s) Oral every 6 hours  sertraline 50 milliGRAM(s) Oral daily  metoprolol 25 milliGRAM(s) Oral four times a day  hydrALAZINE 25 milliGRAM(s) Oral two times a day    MEDICATIONS  (PRN):  acetaminophen   Tablet. 650 milliGRAM(s) Oral every 6 hours PRN Mild Pain (1 - 3)      Allergies    Hay Fever (Rhinorrhea)  No Known Drug Allergies    Intolerances        REVIEW OF SYSTEMS:  CONSTITUTIONAL: No fever, weight loss, or fatigue  EYES: No eye pain, visual disturbances, or discharge  RESPIRATORY: No cough, wheezing, chills or hemoptysis; No shortness of breath  CARDIOVASCULAR: No chest pain, palpitations, dizziness, or leg swelling  GASTROINTESTINAL: No abdominal or epigastric pain. No nausea, vomiting, or hematemesis; No diarrhea or constipation. No melena or hematochezia.  GENITOURINARY: No dysuria, frequency, hematuria, or incontinence  NEUROLOGICAL: No headaches, memory loss, loss of strength, numbness, or tremors  ENDOCRINE: No heat or cold intolerance; No hair loss; No polydipsia or polyuria  MUSCULOSKELETAL: No joint pain or swelling; No muscle, back, or extremity pain      Vital Signs Last 24 Hrs  T(C): 36.3 (09 Sep 2017 13:49), Max: 36.9 (09 Sep 2017 00:00)  T(F): 97.3 (09 Sep 2017 13:49), Max: 98.4 (09 Sep 2017 00:00)  HR: 82 (09 Sep 2017 13:49) (71 - 93)  BP: 104/68 (09 Sep 2017 13:49) (79/54 - 169/62)  BP(mean): --  RR: 15 (09 Sep 2017 13:49) (12 - 17)  SpO2: 100% (09 Sep 2017 13:49) (99% - 100%)    PHYSICAL EXAM:  GENERAL: NAD, well-groomed, well-developed  HEAD:  Atraumatic, Normocephalic  NERVOUS SYSTEM:  Alert & Oriented X3, Good concentration; Motor Strength 5/5 B/L upper and lower extremities; DTRs 2+ intact and symmetric  CHEST/LUNG: Clear to auscultation bilaterally; No rales, rhonchi, wheezing, or rubs  HEART: Regular rate and rhythm; No murmurs, rubs, or gallops  ABDOMEN: Soft, Nontender, Nondistended; Bowel sounds present  EXTREMITIES:  2+ Peripheral Pulses, No clubbing, cyanosis, or edema      LABS:                        10.2   6.5   )-----------( 224      ( 09 Sep 2017 05:43 )             29.2     09 Sep 2017 05:43    141    |  105    |  83     ----------------------------<  87     3.7     |  23     |  4.50     Ca    8.6        09 Sep 2017 05:43  Mg     2.6       09 Sep 2017 05:43    TPro  6.7    /  Alb  3.1    /  TBili  0.4    /  DBili  x      /  AST  20     /  ALT  15     /  AlkPhos  81     08 Sep 2017 17:33    PT/INR - ( 09 Sep 2017 05:43 )   PT: 40.9 sec;   INR: 3.65 ratio         PTT - ( 09 Sep 2017 05:43 )  PTT:40.4 sec  Urinalysis Basic - ( 08 Sep 2017 12:51 )    Color: Yellow / Appearance: Turbid / S.015 / pH: x  Gluc: x / Ketone: Negative  / Bili: Negative / Urobili: Negative   Blood: x / Protein: 150 mg/dL / Nitrite: Negative   Leuk Esterase: Moderate / RBC: 6-10 /HPF / WBC >50   Sq Epi: x / Non Sq Epi: Few / Bacteria: Few          CAPILLARY BLOOD GLUCOSE          RADIOLOGY & ADDITIONAL TESTS:    Imaging Personally Reviewed:  [ ] YES     Consultant(s) Notes Reviewed:      Care Discussed with Consultants/Other Providers:

## 2017-09-09 NOTE — PROGRESS NOTE ADULT - SUBJECTIVE AND OBJECTIVE BOX
[facility] - Alejandro Hernandez, Sandip, Alvaro, Jasmina, Brannon Negrete  Office Number:  [phone]    Patient resting comfortably in bed in NAD.  Laying flat with no respiratory distress.  No complaints of chest pain, dyspnea, palpitations, PND, or orthopnea.    MEDICATIONS  (STANDING):  cefTRIAXone   IVPB 1 Gram(s) IV Intermittent every 24 hours  tamsulosin 0.4 milliGRAM(s) Oral at bedtime  diltiazem    Tablet 30 milliGRAM(s) Oral every 6 hours  sertraline 50 milliGRAM(s) Oral daily  metoprolol 50 milliGRAM(s) Oral four times a day  sodium chloride 0.9% Bolus 500 milliLiter(s) IV Bolus once    MEDICATIONS  (PRN):  acetaminophen   Tablet. 650 milliGRAM(s) Oral every 6 hours PRN Mild Pain (1 - 3)      Allergies    Hay Fever (Rhinorrhea)  No Known Drug Allergies        Vital Signs Last 24 Hrs  T(C): 36.4 (08 Sep 2017 19:20), Max: 36.5 (08 Sep 2017 18:05)  T(F): 97.5 (08 Sep 2017 19:20), Max: 97.7 (08 Sep 2017 18:05)  HR: 88 (08 Sep 2017 19:20) (88 - 93)  BP: 160/74 (08 Sep 2017 19:20) (142/62 - 169/62)  BP(mean): --  RR: 16 (08 Sep 2017 19:20) (12 - 16)  SpO2: 100% (08 Sep 2017 19:20) (99% - 100%)    I&O's Summary      ON EXAM:    General: NAD, awake and alert, oriented x 3  HEENT: Mucous membranes are moist, anicteric  Lungs: Non-labored, breath sounds are clear bilaterally, No wheezing, rales or rhonchi  Cardiovascular: Irregular, S1 and S2, no murmurs, rubs, or gallops  Gastrointestinal: Bowel Sounds present, soft, nontender.   Lymph: No peripheral edema. No lymphadenopathy.  Skin: No rashes or ulcers  Psych:  Mood & affect appropriate    LABS: All Labs Reviewed:                        10.2   6.5   )-----------( 224      ( 09 Sep 2017 05:43 )             29.2                         11.6   9.8   )-----------( 227      ( 08 Sep 2017 17:33 )             33.2     09 Sep 2017 05:43    141    |  105    |  83     ----------------------------<  87     3.7     |  23     |  4.50   08 Sep 2017 17:33    139    |  105    |  85     ----------------------------<  100    4.4     |  21     |  4.50     Ca    8.6        09 Sep 2017 05:43  Ca    8.7        08 Sep 2017 17:33  Mg     2.6       09 Sep 2017 05:43    TPro  6.7    /  Alb  3.1    /  TBili  0.4    /  DBili  x      /  AST  20     /  ALT  15     /  AlkPhos  81     08 Sep 2017 17:33    PT/INR - ( 09 Sep 2017 05:43 )   PT: 40.9 sec;   INR: 3.65 ratio         PTT - ( 09 Sep 2017 05:43 )  PTT:40.4 sec

## 2017-09-10 DIAGNOSIS — N13.30 UNSPECIFIED HYDRONEPHROSIS: ICD-10-CM

## 2017-09-10 LAB
ANION GAP SERPL CALC-SCNC: 11 MMOL/L — SIGNIFICANT CHANGE UP (ref 5–17)
BUN SERPL-MCNC: 81 MG/DL — HIGH (ref 7–23)
CALCIUM SERPL-MCNC: 8.3 MG/DL — LOW (ref 8.5–10.1)
CHLORIDE SERPL-SCNC: 110 MMOL/L — HIGH (ref 96–108)
CO2 SERPL-SCNC: 23 MMOL/L — SIGNIFICANT CHANGE UP (ref 22–31)
CREAT SERPL-MCNC: 3.8 MG/DL — HIGH (ref 0.5–1.3)
GLUCOSE SERPL-MCNC: 83 MG/DL — SIGNIFICANT CHANGE UP (ref 70–99)
HCT VFR BLD CALC: 28.7 % — LOW (ref 39–50)
HCT VFR BLD CALC: 29.6 % — LOW (ref 39–50)
HGB BLD-MCNC: 10.4 G/DL — LOW (ref 13–17)
HGB BLD-MCNC: 9.8 G/DL — LOW (ref 13–17)
INR BLD: 2.48 RATIO — HIGH (ref 0.88–1.16)
MCHC RBC-ENTMCNC: 33.4 PG — SIGNIFICANT CHANGE UP (ref 27–34)
MCHC RBC-ENTMCNC: 34.3 GM/DL — SIGNIFICANT CHANGE UP (ref 32–36)
MCHC RBC-ENTMCNC: 34.4 PG — HIGH (ref 27–34)
MCHC RBC-ENTMCNC: 35.1 GM/DL — SIGNIFICANT CHANGE UP (ref 32–36)
MCV RBC AUTO: 97.6 FL — SIGNIFICANT CHANGE UP (ref 80–100)
MCV RBC AUTO: 98.1 FL — SIGNIFICANT CHANGE UP (ref 80–100)
PLATELET # BLD AUTO: 237 K/UL — SIGNIFICANT CHANGE UP (ref 150–400)
PLATELET # BLD AUTO: 257 K/UL — SIGNIFICANT CHANGE UP (ref 150–400)
POTASSIUM SERPL-MCNC: 3.4 MMOL/L — LOW (ref 3.5–5.3)
POTASSIUM SERPL-SCNC: 3.4 MMOL/L — LOW (ref 3.5–5.3)
PROTHROM AB SERPL-ACNC: 27.5 SEC — HIGH (ref 9.8–12.7)
RBC # BLD: 2.94 M/UL — LOW (ref 4.2–5.8)
RBC # BLD: 3.02 M/UL — LOW (ref 4.2–5.8)
RBC # FLD: 11.4 % — SIGNIFICANT CHANGE UP (ref 10.3–14.5)
RBC # FLD: 11.5 % — SIGNIFICANT CHANGE UP (ref 10.3–14.5)
SODIUM SERPL-SCNC: 144 MMOL/L — SIGNIFICANT CHANGE UP (ref 135–145)
WBC # BLD: 6.3 K/UL — SIGNIFICANT CHANGE UP (ref 3.8–10.5)
WBC # BLD: 6.4 K/UL — SIGNIFICANT CHANGE UP (ref 3.8–10.5)
WBC # FLD AUTO: 6.3 K/UL — SIGNIFICANT CHANGE UP (ref 3.8–10.5)
WBC # FLD AUTO: 6.4 K/UL — SIGNIFICANT CHANGE UP (ref 3.8–10.5)

## 2017-09-10 PROCEDURE — 99233 SBSQ HOSP IP/OBS HIGH 50: CPT

## 2017-09-10 RX ORDER — SODIUM CHLORIDE 9 MG/ML
250 INJECTION INTRAMUSCULAR; INTRAVENOUS; SUBCUTANEOUS ONCE
Qty: 0 | Refills: 0 | Status: COMPLETED | OUTPATIENT
Start: 2017-09-10 | End: 2017-09-10

## 2017-09-10 RX ORDER — METOPROLOL TARTRATE 50 MG
25 TABLET ORAL
Qty: 0 | Refills: 0 | Status: DISCONTINUED | OUTPATIENT
Start: 2017-09-10 | End: 2017-09-12

## 2017-09-10 RX ADMIN — SERTRALINE 50 MILLIGRAM(S): 25 TABLET, FILM COATED ORAL at 12:05

## 2017-09-10 RX ADMIN — SODIUM CHLORIDE 1000 MILLILITER(S): 9 INJECTION INTRAMUSCULAR; INTRAVENOUS; SUBCUTANEOUS at 06:28

## 2017-09-10 RX ADMIN — Medication 25 MILLIGRAM(S): at 23:11

## 2017-09-10 RX ADMIN — Medication 25 MILLIGRAM(S): at 18:07

## 2017-09-10 RX ADMIN — Medication 25 MILLIGRAM(S): at 00:29

## 2017-09-10 RX ADMIN — TAMSULOSIN HYDROCHLORIDE 0.4 MILLIGRAM(S): 0.4 CAPSULE ORAL at 23:11

## 2017-09-10 RX ADMIN — CEFTRIAXONE 100 GRAM(S): 500 INJECTION, POWDER, FOR SOLUTION INTRAMUSCULAR; INTRAVENOUS at 16:39

## 2017-09-10 NOTE — PROGRESS NOTE ADULT - PROBLEM SELECTOR PLAN 1
admit medicine  cardio, Mary consult appreciated  uroKem consult appreciated.   continue with tamsulin   monitor urine output and renal function closely.   avoid nephrotoxic agents urology  consult appreciated.   continue with tamsulin   monitor urine output and renal function closely.   avoid nephrotoxic agents

## 2017-09-10 NOTE — PROGRESS NOTE ADULT - PROBLEM SELECTOR PLAN 5
continue home regimen w/ diuretics and BP meds  f/u cardio recs continue home regimen w/ diuretics and BP meds  cardiology evaluation appreciated.   f/u cardiology recommendation.

## 2017-09-10 NOTE — PROGRESS NOTE ADULT - PROBLEM SELECTOR PLAN 8
hold warfarin as INR is supratherapeutic  no bleeding, H/H at baseline hold warfarin as INR is 2.48  and pt is having hematuria.   no bleeding, H/H at baseline

## 2017-09-10 NOTE — PROVIDER CONTACT NOTE (OTHER) - RECOMMENDATIONS
Dr. Souza aware of hematuria.  Creatinine 3.8.  Patient has history of Bladder CA and prostate Ca and vega is probably causing some irritation.  Poatssium 3.4.  Dr. Lewis made aware.

## 2017-09-10 NOTE — PROVIDER CONTACT NOTE (MEDICATION) - ACTION/TREATMENT ORDERED:
Dr. Lewis stated to hold cardizem but to give lopressor 25mg
patient order 250 NS bolus x1 does med given as ordered

## 2017-09-10 NOTE — PROVIDER CONTACT NOTE (MEDICATION) - SITUATION
patient noted to have BP 74/46 electronic and manual 78/50 asymptotic.
Patient has lopressor 25mg order 4X a day.  He takes lopressor 2X a day at home and cardizem 120mg extended release daily.  VS documented in flow sheet

## 2017-09-10 NOTE — PROGRESS NOTE ADULT - SUBJECTIVE AND OBJECTIVE BOX
Patient is a 89y old  Male who presents with a chief complaint of urinary retention (08 Sep 2017 17:07)      INTERVAL HPI/OVERNIGHT EVENTS:    MEDICATIONS  (STANDING):  cefTRIAXone   IVPB 1 Gram(s) IV Intermittent every 24 hours  tamsulosin 0.4 milliGRAM(s) Oral at bedtime  diltiazem    Tablet 30 milliGRAM(s) Oral every 6 hours  sertraline 50 milliGRAM(s) Oral daily  metoprolol 25 milliGRAM(s) Oral four times a day    MEDICATIONS  (PRN):  acetaminophen   Tablet. 650 milliGRAM(s) Oral every 6 hours PRN Mild Pain (1 - 3)      Allergies    Hay Fever (Rhinorrhea)  No Known Drug Allergies    Intolerances        REVIEW OF SYSTEMS:  CONSTITUTIONAL: No fever, weight loss, or fatigue  EYES: No eye pain, visual disturbances, or discharge  ENMT:  No difficulty hearing, tinnitus, vertigo; No sinus or throat pain  NECK: No pain or stiffness  BREASTS: No pain, masses, or nipple discharge  RESPIRATORY: No cough, wheezing, chills or hemoptysis; No shortness of breath  CARDIOVASCULAR: No chest pain, palpitations, dizziness, or leg swelling  GASTROINTESTINAL: No abdominal or epigastric pain. No nausea, vomiting, or hematemesis; No diarrhea or constipation. No melena or hematochezia.  GENITOURINARY: No dysuria, frequency, hematuria, or incontinence  NEUROLOGICAL: No headaches, memory loss, loss of strength, numbness, or tremors  SKIN: No itching, burning, rashes, or lesions   LYMPH NODES: No enlarged glands  ENDOCRINE: No heat or cold intolerance; No hair loss; No polydipsia or polyuria  MUSCULOSKELETAL: No joint pain or swelling; No muscle, back, or extremity pain  PSYCHIATRIC: No depression, anxiety, mood swings, or difficulty sleeping  HEME/LYMPH: No easy bruising, or bleeding gums  ALLERGY AND IMMUNOLOGIC: No hives or eczema    Vital Signs Last 24 Hrs  T(C): 36.3 (10 Sep 2017 05:19), Max: 36.7 (09 Sep 2017 19:57)  T(F): 97.3 (10 Sep 2017 05:19), Max: 98.1 (09 Sep 2017 19:57)  HR: 92 (10 Sep 2017 12:01) (79 - 93)  BP: 101/62 (10 Sep 2017 12:01) (74/46 - 131/77)  BP(mean): --  RR: 17 (10 Sep 2017 12:01) (15 - 17)  SpO2: 100% (10 Sep 2017 12:01) (97% - 100%)    PHYSICAL EXAM:  GENERAL: NAD, well-groomed, well-developed  HEAD:  Atraumatic, Normocephalic  EYES: EOMI, PERRLA, conjunctiva and sclera clear  ENMT: No tonsillar erythema, exudates, or enlargement; Moist mucous membranes, Good dentition, No lesions  NECK: Supple, No JVD, Normal thyroid  NERVOUS SYSTEM:  Alert & Oriented X3, Good concentration; Motor Strength 5/5 B/L upper and lower extremities; DTRs 2+ intact and symmetric  CHEST/LUNG: Clear to auscultation bilaterally; No rales, rhonchi, wheezing, or rubs  HEART: Regular rate and rhythm; No murmurs, rubs, or gallops  ABDOMEN: Soft, Nontender, Nondistended; Bowel sounds present  EXTREMITIES:  2+ Peripheral Pulses, No clubbing, cyanosis, or edema  LYMPH: No lymphadenopathy noted  SKIN: No rashes or lesions    LABS:                        9.8    6.4   )-----------( 237      ( 10 Sep 2017 07:04 )             28.7     10 Sep 2017 07:04    144    |  110    |  81     ----------------------------<  83     3.4     |  23     |  3.80     Ca    8.3        10 Sep 2017 07:04      PT/INR - ( 10 Sep 2017 07:04 )   PT: 27.5 sec;   INR: 2.48 ratio         PTT - ( 09 Sep 2017 05:43 )  PTT:40.4 sec        CAPILLARY BLOOD GLUCOSE          RADIOLOGY & ADDITIONAL TESTS:    Imaging Personally Reviewed:  [ ] YES     Consultant(s) Notes Reviewed:      Care Discussed with Consultants/Other Providers: Patient is a 89y old  Male who presents with a chief complaint of urinary retention (08 Sep 2017 17:07)      INTERVAL HPI/OVERNIGHT EVENTS: pt developed hematuria last night .     MEDICATIONS  (STANDING):  cefTRIAXone   IVPB 1 Gram(s) IV Intermittent every 24 hours  tamsulosin 0.4 milliGRAM(s) Oral at bedtime  diltiazem    Tablet 30 milliGRAM(s) Oral every 6 hours  sertraline 50 milliGRAM(s) Oral daily  metoprolol 25 milliGRAM(s) Oral four times a day    MEDICATIONS  (PRN):  acetaminophen   Tablet. 650 milliGRAM(s) Oral every 6 hours PRN Mild Pain (1 - 3)      Allergies    Hay Fever (Rhinorrhea)  No Known Drug Allergies    Intolerances        REVIEW OF SYSTEMS:  CONSTITUTIONAL: No fever, weight loss, or fatigue  EYES: No eye pain, visual disturbances, or discharge  RESPIRATORY: No cough, wheezing, chills or hemoptysis; No shortness of breath  CARDIOVASCULAR: No chest pain, palpitations, dizziness, or leg swelling  GASTROINTESTINAL: No abdominal or epigastric pain. No nausea, vomiting, or hematemesis; No diarrhea or constipation. No melena or hematochezia.  GENITOURINARY: c/o  hematuria,   NEUROLOGICAL: No headaches, memory loss, loss of strength, numbness, or tremors  ENDOCRINE: No heat or cold intolerance; No hair loss; No polydipsia or polyuria  MUSCULOSKELETAL: No joint pain or swelling; No muscle, back, or extremity pain  PSYCHIATRIC: No depression, anxiety, mood swings, or difficulty sleeping      Vital Signs Last 24 Hrs  T(C): 36.3 (10 Sep 2017 05:19), Max: 36.7 (09 Sep 2017 19:57)  T(F): 97.3 (10 Sep 2017 05:19), Max: 98.1 (09 Sep 2017 19:57)  HR: 92 (10 Sep 2017 12:01) (79 - 93)  BP: 101/62 (10 Sep 2017 12:01) (74/46 - 131/77)  BP(mean): --  RR: 17 (10 Sep 2017 12:01) (15 - 17)  SpO2: 100% (10 Sep 2017 12:01) (97% - 100%)    PHYSICAL EXAM:  GENERAL: NAD, well-groomed, well-developed  HEAD:  Atraumatic, Normocephalic  NERVOUS SYSTEM:  Alert & Oriented X3, Good concentration; Motor Strength 5/5 B/L upper and lower extremities; DTRs 2+ intact and symmetric  CHEST/LUNG: Clear to auscultation bilaterally; No rales, rhonchi, wheezing, or rubs  HEART: Regular rate and rhythm; No murmurs, rubs, or gallops  ABDOMEN: Soft, Nontender, Nondistended; Bowel sounds present  EXTREMITIES:  2+ Peripheral Pulses, No clubbing, cyanosis, or edema      LABS:                        9.8    6.4   )-----------( 237      ( 10 Sep 2017 07:04 )             28.7     10 Sep 2017 07:04    144    |  110    |  81     ----------------------------<  83     3.4     |  23     |  3.80     Ca    8.3        10 Sep 2017 07:04      PT/INR - ( 10 Sep 2017 07:04 )   PT: 27.5 sec;   INR: 2.48 ratio         PTT - ( 09 Sep 2017 05:43 )  PTT:40.4 sec        CAPILLARY BLOOD GLUCOSE          RADIOLOGY & ADDITIONAL TESTS:    Imaging Personally Reviewed:  [ ] YES     Consultant(s) Notes Reviewed:      Care Discussed with Consultants/Other Providers:

## 2017-09-10 NOTE — PROGRESS NOTE ADULT - SUBJECTIVE AND OBJECTIVE BOX
INTERVAL HPI/OVERNIGHT EVENTS:  Comfortable. No new c/o.    MEDICATIONS  (STANDING):  cefTRIAXone   IVPB 1 Gram(s) IV Intermittent every 24 hours  tamsulosin 0.4 milliGRAM(s) Oral at bedtime  diltiazem    Tablet 30 milliGRAM(s) Oral every 6 hours  sertraline 50 milliGRAM(s) Oral daily  metoprolol 25 milliGRAM(s) Oral four times a day  hydrALAZINE 25 milliGRAM(s) Oral two times a day    MEDICATIONS  (PRN):  acetaminophen   Tablet. 650 milliGRAM(s) Oral every 6 hours PRN Mild Pain (1 - 3)        Vital Signs Last 24 Hrs  T(C): 36.3 (10 Sep 2017 05:19), Max: 36.7 (09 Sep 2017 19:57)  T(F): 97.3 (10 Sep 2017 05:19), Max: 98.1 (09 Sep 2017 19:57)  HR: 89 (10 Sep 2017 06:29) (76 - 93)  BP: 123/78 (10 Sep 2017 06:29) (74/46 - 150/68)  BP(mean): --  RR: 17 (10 Sep 2017 05:19) (15 - 17)  SpO2: 100% (10 Sep 2017 05:19) (97% - 100%)    PHYSICAL EXAM:    ABDOMEN: soft, NT, ND  No CVAT  GENITALIA: vega - draining well, urine clear    LABS:                        9.8    6.4   )-----------( 237      ( 10 Sep 2017 07:04 )             28.7         141  |  105  |  83<H>  ----------------------------<  87  3.7   |  23  |  4.50<H>    Ca    8.6      09 Sep 2017 05:43  Mg     2.6         TPro  6.7  /  Alb  3.1<L>  /  TBili  0.4  /  DBili  x   /  AST  20  /  ALT  15  /  AlkPhos  81  08    PT/INR - ( 10 Sep 2017 07:04 )   PT: 27.5 sec;   INR: 2.48 ratio         PTT - ( 09 Sep 2017 05:43 )  PTT:40.4 sec  Urinalysis Basic - ( 08 Sep 2017 12:51 )    Color: Yellow / Appearance: Turbid / S.015 / pH: x  Gluc: x / Ketone: Negative  / Bili: Negative / Urobili: Negative   Blood: x / Protein: 150 mg/dL / Nitrite: Negative   Leuk Esterase: Moderate / RBC: 6-10 /HPF / WBC >50   Sq Epi: x / Non Sq Epi: Few / Bacteria: Few        Blood Cultures:  Urine cx: neg    RADIOLOGY & ADDITIONAL TESTS:

## 2017-09-10 NOTE — PROGRESS NOTE ADULT - SUBJECTIVE AND OBJECTIVE BOX
[facility] - Alejandro Hernandez, Sandip, Alvaro, Jasmina, Caden Chepegerald  Office Number:  [phone]    Patient resting comfortably in bed in NAD.  Laying flat with no respiratory distress.  No complaints of chest pain, dyspnea, palpitations, PND, or orthopnea.  Patient was hypotensive earlier with an SBP in the 70s.  Hydralazine, diltiazem, and metoprolol held.      MEDICATIONS  (STANDING):  cefTRIAXone   IVPB 1 Gram(s) IV Intermittent every 24 hours  tamsulosin 0.4 milliGRAM(s) Oral at bedtime  diltiazem    Tablet 30 milliGRAM(s) Oral every 6 hours  sertraline 50 milliGRAM(s) Oral daily  metoprolol 25 milliGRAM(s) Oral four times a day  hydrALAZINE 25 milliGRAM(s) Oral two times a day    MEDICATIONS  (PRN):  acetaminophen   Tablet. 650 milliGRAM(s) Oral every 6 hours PRN Mild Pain (1 - 3)      Allergies    Hay Fever (Rhinorrhea)  No Known Drug Allergies        Vital Signs Last 24 Hrs  T(C): 36.3 (10 Sep 2017 05:19), Max: 36.7 (09 Sep 2017 19:57)  T(F): 97.3 (10 Sep 2017 05:19), Max: 98.1 (09 Sep 2017 19:57)  HR: 89 (10 Sep 2017 06:29) (76 - 93)  BP: 123/78 (10 Sep 2017 06:29) (74/46 - 150/68)  BP(mean): --  RR: 17 (10 Sep 2017 05:19) (15 - 17)  SpO2: 100% (10 Sep 2017 05:19) (97% - 100%)    I&O's Summary    09 Sep 2017 07:01  -  10 Sep 2017 07:00  --------------------------------------------------------  IN: 360 mL / OUT: 1050 mL / NET: -690 mL        ON EXAM:    General: NAD, awake and alert, oriented x 3  HEENT: Mucous membranes are moist, anicteric  Lungs: Non-labored, breath sounds are clear bilaterally, No wheezing, rales or rhonchi  Cardiovascular: Irregular, S1 and S2, no murmurs, rubs, or gallops  Gastrointestinal: Bowel Sounds present, soft, nontender.   Lymph: No peripheral edema. No lymphadenopathy.  Skin: No rashes or ulcers  Psych:  Mood & affect appropriate    LABS: All Labs Reviewed:                        9.8    6.4   )-----------( 237      ( 10 Sep 2017 07:04 )             28.7                         10.2   6.5   )-----------( 224      ( 09 Sep 2017 05:43 )             29.2                         11.6   9.8   )-----------( 227      ( 08 Sep 2017 17:33 )             33.2     10 Sep 2017 07:04    144    |  110    |  81     ----------------------------<  83     3.4     |  23     |  3.80   09 Sep 2017 05:43    141    |  105    |  83     ----------------------------<  87     3.7     |  23     |  4.50   08 Sep 2017 17:33    139    |  105    |  85     ----------------------------<  100    4.4     |  21     |  4.50     Ca    8.3        10 Sep 2017 07:04  Ca    8.6        09 Sep 2017 05:43  Ca    8.7        08 Sep 2017 17:33  Mg     2.6       09 Sep 2017 05:43    TPro  6.7    /  Alb  3.1    /  TBili  0.4    /  DBili  x      /  AST  20     /  ALT  15     /  AlkPhos  81     08 Sep 2017 17:33    PT/INR - ( 10 Sep 2017 07:04 )   PT: 27.5 sec;   INR: 2.48 ratio         PTT - ( 09 Sep 2017 05:43 )  PTT:40.4 sec

## 2017-09-11 LAB
ANION GAP SERPL CALC-SCNC: 10 MMOL/L — SIGNIFICANT CHANGE UP (ref 5–17)
APTT BLD: 34.9 SEC — SIGNIFICANT CHANGE UP (ref 27.5–37.4)
APTT BLD: >200 SEC — CRITICAL HIGH (ref 27.5–37.4)
BUN SERPL-MCNC: 76 MG/DL — HIGH (ref 7–23)
CALCIUM SERPL-MCNC: 8.6 MG/DL — SIGNIFICANT CHANGE UP (ref 8.5–10.1)
CHLORIDE SERPL-SCNC: 111 MMOL/L — HIGH (ref 96–108)
CO2 SERPL-SCNC: 24 MMOL/L — SIGNIFICANT CHANGE UP (ref 22–31)
CREAT SERPL-MCNC: 3.3 MG/DL — HIGH (ref 0.5–1.3)
GLUCOSE SERPL-MCNC: 89 MG/DL — SIGNIFICANT CHANGE UP (ref 70–99)
HCT VFR BLD CALC: 27.9 % — LOW (ref 39–50)
HCT VFR BLD CALC: 28.8 % — LOW (ref 39–50)
HGB BLD-MCNC: 10.2 G/DL — LOW (ref 13–17)
HGB BLD-MCNC: 9.9 G/DL — LOW (ref 13–17)
INR BLD: 1.83 RATIO — HIGH (ref 0.88–1.16)
MCHC RBC-ENTMCNC: 34.4 PG — HIGH (ref 27–34)
MCHC RBC-ENTMCNC: 34.8 PG — HIGH (ref 27–34)
MCHC RBC-ENTMCNC: 35.3 GM/DL — SIGNIFICANT CHANGE UP (ref 32–36)
MCHC RBC-ENTMCNC: 35.3 GM/DL — SIGNIFICANT CHANGE UP (ref 32–36)
MCV RBC AUTO: 97.6 FL — SIGNIFICANT CHANGE UP (ref 80–100)
MCV RBC AUTO: 98.6 FL — SIGNIFICANT CHANGE UP (ref 80–100)
PLATELET # BLD AUTO: 252 K/UL — SIGNIFICANT CHANGE UP (ref 150–400)
PLATELET # BLD AUTO: 272 K/UL — SIGNIFICANT CHANGE UP (ref 150–400)
POTASSIUM SERPL-MCNC: 3.5 MMOL/L — SIGNIFICANT CHANGE UP (ref 3.5–5.3)
POTASSIUM SERPL-SCNC: 3.5 MMOL/L — SIGNIFICANT CHANGE UP (ref 3.5–5.3)
PROTHROM AB SERPL-ACNC: 20.2 SEC — HIGH (ref 9.8–12.7)
RBC # BLD: 2.83 M/UL — LOW (ref 4.2–5.8)
RBC # BLD: 2.95 M/UL — LOW (ref 4.2–5.8)
RBC # FLD: 11.2 % — SIGNIFICANT CHANGE UP (ref 10.3–14.5)
RBC # FLD: 11.7 % — SIGNIFICANT CHANGE UP (ref 10.3–14.5)
SODIUM SERPL-SCNC: 145 MMOL/L — SIGNIFICANT CHANGE UP (ref 135–145)
WBC # BLD: 6.1 K/UL — SIGNIFICANT CHANGE UP (ref 3.8–10.5)
WBC # BLD: 7.4 K/UL — SIGNIFICANT CHANGE UP (ref 3.8–10.5)
WBC # FLD AUTO: 6.1 K/UL — SIGNIFICANT CHANGE UP (ref 3.8–10.5)
WBC # FLD AUTO: 7.4 K/UL — SIGNIFICANT CHANGE UP (ref 3.8–10.5)

## 2017-09-11 PROCEDURE — 76775 US EXAM ABDO BACK WALL LIM: CPT | Mod: 26

## 2017-09-11 PROCEDURE — 99233 SBSQ HOSP IP/OBS HIGH 50: CPT | Mod: GC

## 2017-09-11 PROCEDURE — 99233 SBSQ HOSP IP/OBS HIGH 50: CPT

## 2017-09-11 RX ORDER — TAMSULOSIN HYDROCHLORIDE 0.4 MG/1
0.8 CAPSULE ORAL AT BEDTIME
Qty: 0 | Refills: 0 | Status: DISCONTINUED | OUTPATIENT
Start: 2017-09-11 | End: 2017-09-12

## 2017-09-11 RX ORDER — HEPARIN SODIUM 5000 [USP'U]/ML
INJECTION INTRAVENOUS; SUBCUTANEOUS
Qty: 25000 | Refills: 0 | Status: DISCONTINUED | OUTPATIENT
Start: 2017-09-11 | End: 2017-09-12

## 2017-09-11 RX ORDER — HEPARIN SODIUM 5000 [USP'U]/ML
4500 INJECTION INTRAVENOUS; SUBCUTANEOUS EVERY 6 HOURS
Qty: 0 | Refills: 0 | Status: DISCONTINUED | OUTPATIENT
Start: 2017-09-11 | End: 2017-09-12

## 2017-09-11 RX ORDER — HEPARIN SODIUM 5000 [USP'U]/ML
2000 INJECTION INTRAVENOUS; SUBCUTANEOUS EVERY 6 HOURS
Qty: 0 | Refills: 0 | Status: DISCONTINUED | OUTPATIENT
Start: 2017-09-11 | End: 2017-09-12

## 2017-09-11 RX ORDER — HEPARIN SODIUM 5000 [USP'U]/ML
4500 INJECTION INTRAVENOUS; SUBCUTANEOUS ONCE
Qty: 0 | Refills: 0 | Status: COMPLETED | OUTPATIENT
Start: 2017-09-11 | End: 2017-09-11

## 2017-09-11 RX ADMIN — Medication 25 MILLIGRAM(S): at 17:37

## 2017-09-11 RX ADMIN — HEPARIN SODIUM 1100 UNIT(S)/HR: 5000 INJECTION INTRAVENOUS; SUBCUTANEOUS at 16:42

## 2017-09-11 RX ADMIN — TAMSULOSIN HYDROCHLORIDE 0.8 MILLIGRAM(S): 0.4 CAPSULE ORAL at 22:05

## 2017-09-11 RX ADMIN — Medication 25 MILLIGRAM(S): at 05:40

## 2017-09-11 RX ADMIN — CEFTRIAXONE 100 GRAM(S): 500 INJECTION, POWDER, FOR SOLUTION INTRAMUSCULAR; INTRAVENOUS at 17:37

## 2017-09-11 RX ADMIN — SERTRALINE 50 MILLIGRAM(S): 25 TABLET, FILM COATED ORAL at 11:23

## 2017-09-11 RX ADMIN — HEPARIN SODIUM 4500 UNIT(S): 5000 INJECTION INTRAVENOUS; SUBCUTANEOUS at 16:42

## 2017-09-11 NOTE — PROGRESS NOTE ADULT - SUBJECTIVE AND OBJECTIVE BOX
[facility]    Alejandro Hernandez, Sandip, Alvaro, Jasmina, Caden, Chepegerald      [phone]    CHIEF COMPLAINT: Patient is a 89y old  Male who presents with a chief complaint of urinary retention (08 Sep 2017 17:07)      Follow Up:  severe nicm, af    Interim history: The patient reports no new symptoms.  Denies chest discomfort and shortness of breath.  No abdominal pain.  No new neurologic symptoms.      MEDICATIONS  (STANDING):  cefTRIAXone   IVPB 1 Gram(s) IV Intermittent every 24 hours  diltiazem    Tablet 30 milliGRAM(s) Oral every 6 hours  sertraline 50 milliGRAM(s) Oral daily  metoprolol 25 milliGRAM(s) Oral four times a day  tamsulosin 0.8 milliGRAM(s) Oral at bedtime    MEDICATIONS  (PRN):  acetaminophen   Tablet. 650 milliGRAM(s) Oral every 6 hours PRN Mild Pain (1 - 3)      REVIEW OF SYSTEMS:  eye, ent, GI, , allergic, dermatologic, musculoskeletal and neurologic are negative except as described above    Vital Signs Last 24 Hrs  T(C): 36.4 (11 Sep 2017 05:25), Max: 36.6 (10 Sep 2017 19:32)  T(F): 97.6 (11 Sep 2017 05:25), Max: 97.9 (10 Sep 2017 19:32)  HR: 76 (11 Sep 2017 05:39) (76 - 103)  BP: 118/74 (11 Sep 2017 05:39) (101/62 - 146/75)  BP(mean): --  RR: 17 (11 Sep 2017 05:25) (16 - 17)  SpO2: 97% (11 Sep 2017 05:25) (96% - 100%)    I&O's Summary    10 Sep 2017 07:01  -  11 Sep 2017 07:00  --------------------------------------------------------  IN: 170 mL / OUT: 1325 mL / NET: -1155 mL        Telemetry past 24h: off    PHYSICAL EXAM:    Constitutional: well-nourished, well-developed, NAD   HEENT:  MMM, sclerae anicteric, conjunctivae clear, no oral cyanosis.  Pulmonary: Non-labored, breath sounds are clear bilaterally, No wheezing, rales or rhonchi  Cardiovascular: distant irregular, S1 and S2.  No murmur.  No rubs, gallops or clicks  Gastrointestinal: Bowel Sounds present, soft, nontender.   Lymph: No peripheral edema.   Neurological: Alert, no focal deficits  Skin: No rashes.  Psych:  Mood & affect appropriate    LABS: All Labs Reviewed:                        9.9    6.1   )-----------( 252      ( 11 Sep 2017 06:42 )             27.9                         10.4   6.3   )-----------( 257      ( 10 Sep 2017 16:52 )             29.6                         9.8    6.4   )-----------( 237      ( 10 Sep 2017 07:04 )             28.7     11 Sep 2017 06:42    145    |  111    |  76     ----------------------------<  89     3.5     |  24     |  3.30   10 Sep 2017 07:04    144    |  110    |  81     ----------------------------<  83     3.4     |  23     |  3.80   09 Sep 2017 05:43    141    |  105    |  83     ----------------------------<  87     3.7     |  23     |  4.50     Ca    8.6        11 Sep 2017 06:42  Ca    8.3        10 Sep 2017 07:04  Ca    8.6        09 Sep 2017 05:43  Mg     2.6       09 Sep 2017 05:43    TPro  6.7    /  Alb  3.1    /  TBili  0.4    /  DBili  x      /  AST  20     /  ALT  15     /  AlkPhos  81     08 Sep 2017 17:33    PT/INR - ( 11 Sep 2017 06:42 )   PT: 20.2 sec;   INR: 1.83 ratio               Blood Culture:         RADIOLOGY:    EKG:    Echo:    < from: TTE Echo Doppler w/o Cont (10.30.16 @ 10:33) >     EXAM:  ECHO TTE W/O CON COMP W/DOPPLR         PROCEDURE DATE:  10/30/2016        INTERPRETATION:  INDICATION: Atrial fibrillation    Blood Pressure 150/91    Height 165     Weight 61       BSA 1.67    Dimensions:    LA 4.7       Normal Values: 2.0- 4.0 cm    Ao 3.2        Normal Values: 2.0 - 3.8 cm  SEPTUM 1.2       Normal Values: 0.6 - 1.2 cm  PWT 1.2       Normal Values: 0.6 - 1.1 cm  LVIDd 5.5         Normal Values: 3.0 - 5.6 cm  LVIDs 4.8         Normal Values: 1.8 - 4.0 cm    Derived Variables:  LVMI     g/m2  RWT      Fractional Short      Ejection Fraction 15    Doppler Peak v. AoV=   (m/sec)    OBSERVATIONS:    Mitral Valve: normal, 2+ MR.  Aortic Valve/Aorta: Calcified trileaflet aortic valve.  Tricuspid Valve:  2-3+ TR.  Pulmonic Valve: 1+ SC  Left Atrium: Enlarged  Right Atrium: normal  Left Ventricle: Mild concentric LVH, with internal dimensions at the   upper limits of normal. Severe global dysfunction, estimated LVEF of 15%.   All walls appear hypokinetic with minor regional variation.   Right Ventricle: normal size with mild systolic dysfunction.  Pericardium/Pleura: Bilateral pleural effusions, no significant   pericardial effusion. Dilated IVC  Pulmonary/RV Pressure: estimated PA systolic pressure of 65 mmHg assuming   an RA pressure of 10 mmHg., consistent with severe pulmonary hypertension  LV Diastolic Function:    Mild concentric LVH, with internal dimensions at the upper limits of   normal. Severe global dysfunction, estimated LVEF of 15%. All walls   appear hypokinetic with minor regional variation. The right ventricle   appears to be normal in size with mild systolic dysfunction. Left atrial   enlargement. The mitral valve appears structurally normal with 2+ MR.   Calcified aortic valve leaflets without stenosis. 1+ SC. 2-3+ TR.   Estimated PA systolic pressure is 65 mmHg, assuming an RA pressure of 10   mmHg. No significant pericardial effusion. Bilateral pleural effusions.   The IVC appears dilated.                  ZACHARY SWENSON M.D., ATTENDING CARDIOLOGIST  This document has been electronically signed. Oct 31 2016  3:49PM                < end of copied text >

## 2017-09-11 NOTE — PROGRESS NOTE ADULT - PROBLEM SELECTOR PLAN 1
- continue rocephin, continue tamsulin  - Cardio, Dr. Hernandez consulted recs appreciated  - Uro, Dr. Brownlee consulted in ED recs appreciated  - no supplemental IVF  - f/u consultant recs  - vega inserted, monitor urine output  - renal u/s positive for moderate-severe hydronephrosis 2/2 bilateral renal cysts  - monitor bun/cre  - avoid nephrotoxic agents

## 2017-09-11 NOTE — PROGRESS NOTE ADULT - PROBLEM SELECTOR PLAN 8
hold warfarin as INR is supratherapeutic  no bleeding, H/H at baseline hold warfarin   no bleeding, H/H at baseline

## 2017-09-11 NOTE — PROGRESS NOTE ADULT - PROBLEM SELECTOR PLAN 7
hold warfarin as INR is supratherapeutic  daily INR Heparin drip for now  Monitor for bleed closely  If no bleed may consider starting coumadin tomorrow

## 2017-09-11 NOTE — PROGRESS NOTE ADULT - SUBJECTIVE AND OBJECTIVE BOX
INTERVAL HPI/OVERNIGHT EVENTS:Rosas draining well.  800 cc output/24 hours    MEDICATIONS  (STANDING):  cefTRIAXone   IVPB 1 Gram(s) IV Intermittent every 24 hours  tamsulosin 0.4 milliGRAM(s) Oral at bedtime  diltiazem    Tablet 30 milliGRAM(s) Oral every 6 hours  sertraline 50 milliGRAM(s) Oral daily  metoprolol 25 milliGRAM(s) Oral four times a day    MEDICATIONS  (PRN):  acetaminophen   Tablet. 650 milliGRAM(s) Oral every 6 hours PRN Mild Pain (1 - 3)        Vital Signs Last 24 Hrs  T(C): 36.4 (11 Sep 2017 05:25), Max: 36.6 (10 Sep 2017 19:32)  T(F): 97.6 (11 Sep 2017 05:25), Max: 97.9 (10 Sep 2017 19:32)  HR: 76 (11 Sep 2017 05:39) (76 - 103)  BP: 118/74 (11 Sep 2017 05:39) (101/62 - 146/75)  BP(mean): --  RR: 17 (11 Sep 2017 05:25) (16 - 17)  SpO2: 97% (11 Sep 2017 05:25) (96% - 100%)    PHYSICAL EXAM:    ABDOMEN:Soft, NT/ND  GENITALIA:  Rosas draining well.  Urine blood tinged    LABS:                        10.4   6.3   )-----------( 257      ( 10 Sep 2017 16:52 )             29.6     09-10    144  |  110<H>  |  81<H>  ----------------------------<  83  3.4<L>   |  23  |  3.80<H>    Ca    8.3<L>      10 Sep 2017 07:04      PT/INR - ( 10 Sep 2017 07:04 )   PT: 27.5 sec;   INR: 2.48 ratio

## 2017-09-11 NOTE — PROGRESS NOTE ADULT - SUBJECTIVE AND OBJECTIVE BOX
89yo M with PMHx of CHF, afib on warfarin, HTN, HLD, BPH, bladder neoplasm, prostate cancer s/p radiation, depression, gout presented to ED due to urinary retention.     Patient was examined at bedside.  There were no events overnight.  Patient reports that he is feeling better and denies any pain with urination, no fever or chills, no CP, heart palp, SOB, or cough.     REVIEW OF SYSTEMS:    CONSTITUTIONAL: No weakness, fevers or chills  EYES/ENT: No visual changes, no throat pain   RESPIRATORY: No cough, wheezing, hemoptysis; No shortness of breath  CARDIOVASCULAR: No chest pain or palpitations  GASTROINTESTINAL: No abdominal pain, nausea, vomiting, or hematemesis; No diarrhea or constipation. No melena or hematochezia.  GENITOURINARY: No dysuria, frequency or hematuria  NEUROLOGICAL: No dizziness, numbness, or weakness  SKIN: No itching, burning, rashes, or lesions   All other review of systems is negative unless indicated above.    VITAL SIGNS:    Vital Signs Last 24 Hrs  T(C): 36.4 (11 Sep 2017 12:45), Max: 36.6 (10 Sep 2017 19:32)  T(F): 97.5 (11 Sep 2017 12:45), Max: 97.9 (10 Sep 2017 19:32)  HR: 82 (11 Sep 2017 12:45) (51 - 103)  BP: 109/74 (11 Sep 2017 12:45) (108/59 - 146/75)  RR: 18 (11 Sep 2017 12:45) (17 - 18)  SpO2: 100% (11 Sep 2017 12:45) (97% - 100%)    PHYSICAL EXAM:     GENERAL: no acute distress  HEENT: NC/AT, EOMI, neck supple, MMM  RESPIRATORY: LCTAB/L, no rhonchi, rales, or wheezing  CARDIOVASCULAR: RRR, no murmurs, gallops, rubs  ABDOMINAL: soft, non-tender, non-distended, positive bowel sounds   EXTREMITIES: no clubbing, cyanosis, or edema  NEUROLOGICAL: alert and oriented x 3, non-focal  SKIN: no rashes or lesions   MUSCULOSKELETAL: no gross joint deformity                          9.9    6.1   )-----------( 252      ( 11 Sep 2017 06:42 )             27.9     09-11    145  |  111<H>  |  76<H>  ----------------------------<  89  3.5   |  24  |  3.30<H>    Ca    8.6      11 Sep 2017 06:42        CAPILLARY BLOOD GLUCOSE          MEDICATIONS  (STANDING):  cefTRIAXone   IVPB 1 Gram(s) IV Intermittent every 24 hours  diltiazem    Tablet 30 milliGRAM(s) Oral every 6 hours  sertraline 50 milliGRAM(s) Oral daily  metoprolol 25 milliGRAM(s) Oral four times a day  tamsulosin 0.8 milliGRAM(s) Oral at bedtime

## 2017-09-12 ENCOUNTER — TRANSCRIPTION ENCOUNTER (OUTPATIENT)
Age: 82
End: 2017-09-12

## 2017-09-12 VITALS
DIASTOLIC BLOOD PRESSURE: 80 MMHG | OXYGEN SATURATION: 100 % | HEART RATE: 88 BPM | TEMPERATURE: 98 F | SYSTOLIC BLOOD PRESSURE: 154 MMHG | RESPIRATION RATE: 16 BRPM

## 2017-09-12 LAB
ANION GAP SERPL CALC-SCNC: 10 MMOL/L — SIGNIFICANT CHANGE UP (ref 5–17)
APTT BLD: 181.7 SEC — CRITICAL HIGH (ref 27.5–37.4)
APTT BLD: 31.3 SEC — SIGNIFICANT CHANGE UP (ref 27.5–37.4)
BUN SERPL-MCNC: 64 MG/DL — HIGH (ref 7–23)
CALCIUM SERPL-MCNC: 8.3 MG/DL — LOW (ref 8.5–10.1)
CHLORIDE SERPL-SCNC: 113 MMOL/L — HIGH (ref 96–108)
CO2 SERPL-SCNC: 22 MMOL/L — SIGNIFICANT CHANGE UP (ref 22–31)
CREAT SERPL-MCNC: 2.9 MG/DL — HIGH (ref 0.5–1.3)
GLUCOSE SERPL-MCNC: 84 MG/DL — SIGNIFICANT CHANGE UP (ref 70–99)
HCT VFR BLD CALC: 27.6 % — LOW (ref 39–50)
HGB BLD-MCNC: 9.6 G/DL — LOW (ref 13–17)
INR BLD: 1.39 RATIO — HIGH (ref 0.88–1.16)
INR BLD: 1.72 RATIO — HIGH (ref 0.88–1.16)
MCHC RBC-ENTMCNC: 34.1 PG — HIGH (ref 27–34)
MCHC RBC-ENTMCNC: 34.7 GM/DL — SIGNIFICANT CHANGE UP (ref 32–36)
MCV RBC AUTO: 98.2 FL — SIGNIFICANT CHANGE UP (ref 80–100)
PLATELET # BLD AUTO: 239 K/UL — SIGNIFICANT CHANGE UP (ref 150–400)
POTASSIUM SERPL-MCNC: 3.6 MMOL/L — SIGNIFICANT CHANGE UP (ref 3.5–5.3)
POTASSIUM SERPL-SCNC: 3.6 MMOL/L — SIGNIFICANT CHANGE UP (ref 3.5–5.3)
PROTHROM AB SERPL-ACNC: 15.3 SEC — HIGH (ref 9.8–12.7)
PROTHROM AB SERPL-ACNC: 19 SEC — HIGH (ref 9.8–12.7)
RBC # BLD: 2.81 M/UL — LOW (ref 4.2–5.8)
RBC # FLD: 11.3 % — SIGNIFICANT CHANGE UP (ref 10.3–14.5)
SODIUM SERPL-SCNC: 145 MMOL/L — SIGNIFICANT CHANGE UP (ref 135–145)
WBC # BLD: 6.7 K/UL — SIGNIFICANT CHANGE UP (ref 3.8–10.5)
WBC # FLD AUTO: 6.7 K/UL — SIGNIFICANT CHANGE UP (ref 3.8–10.5)

## 2017-09-12 PROCEDURE — 74176 CT ABD & PELVIS W/O CONTRAST: CPT | Mod: 26

## 2017-09-12 PROCEDURE — 99239 HOSP IP/OBS DSCHRG MGMT >30: CPT

## 2017-09-12 PROCEDURE — 99233 SBSQ HOSP IP/OBS HIGH 50: CPT

## 2017-09-12 RX ORDER — INFLUENZA VIRUS VACCINE 15; 15; 15; 15 UG/.5ML; UG/.5ML; UG/.5ML; UG/.5ML
0.5 SUSPENSION INTRAMUSCULAR ONCE
Qty: 0 | Refills: 0 | Status: COMPLETED | OUTPATIENT
Start: 2017-09-12 | End: 2017-09-12

## 2017-09-12 RX ORDER — TAMSULOSIN HYDROCHLORIDE 0.4 MG/1
2 CAPSULE ORAL
Qty: 60 | Refills: 0
Start: 2017-09-12 | End: 2017-10-12

## 2017-09-12 RX ORDER — WARFARIN SODIUM 2.5 MG/1
1 TABLET ORAL ONCE
Qty: 0 | Refills: 0 | Status: DISCONTINUED | OUTPATIENT
Start: 2017-09-12 | End: 2017-09-12

## 2017-09-12 RX ORDER — WARFARIN SODIUM 2.5 MG/1
1.5 TABLET ORAL
Qty: 45 | Refills: 0
Start: 2017-09-12 | End: 2017-10-12

## 2017-09-12 RX ORDER — WARFARIN SODIUM 2.5 MG/1
1 TABLET ORAL
Qty: 30 | Refills: 0 | OUTPATIENT
Start: 2017-09-12 | End: 2017-10-12

## 2017-09-12 RX ORDER — HYDRALAZINE HCL 50 MG
0 TABLET ORAL
Qty: 0 | Refills: 0 | COMMUNITY

## 2017-09-12 RX ORDER — TAMSULOSIN HYDROCHLORIDE 0.4 MG/1
2 CAPSULE ORAL
Qty: 20 | Refills: 0 | OUTPATIENT
Start: 2017-09-12

## 2017-09-12 RX ORDER — METOPROLOL TARTRATE 50 MG
1 TABLET ORAL
Qty: 120 | Refills: 0
Start: 2017-09-12 | End: 2017-10-12

## 2017-09-12 RX ADMIN — HEPARIN SODIUM 700 UNIT(S)/HR: 5000 INJECTION INTRAVENOUS; SUBCUTANEOUS at 08:38

## 2017-09-12 RX ADMIN — Medication 25 MILLIGRAM(S): at 06:01

## 2017-09-12 RX ADMIN — INFLUENZA VIRUS VACCINE 0.5 MILLILITER(S): 15; 15; 15; 15 SUSPENSION INTRAMUSCULAR at 16:03

## 2017-09-12 RX ADMIN — HEPARIN SODIUM 900 UNIT(S)/HR: 5000 INJECTION INTRAVENOUS; SUBCUTANEOUS at 01:17

## 2017-09-12 RX ADMIN — Medication 25 MILLIGRAM(S): at 11:13

## 2017-09-12 RX ADMIN — SERTRALINE 50 MILLIGRAM(S): 25 TABLET, FILM COATED ORAL at 11:13

## 2017-09-12 RX ADMIN — HEPARIN SODIUM 0 UNIT(S)/HR: 5000 INJECTION INTRAVENOUS; SUBCUTANEOUS at 07:36

## 2017-09-12 RX ADMIN — Medication 25 MILLIGRAM(S): at 00:26

## 2017-09-12 RX ADMIN — Medication 25 MILLIGRAM(S): at 17:09

## 2017-09-12 RX ADMIN — HEPARIN SODIUM 0 UNIT(S)/HR: 5000 INJECTION INTRAVENOUS; SUBCUTANEOUS at 00:14

## 2017-09-12 NOTE — PROGRESS NOTE ADULT - SUBJECTIVE AND OBJECTIVE BOX
INTERVAL HPI/OVERNIGHT EVENTS: c/o needing lots of blood tests. Urine c&s NG    MEDICATIONS  (STANDING):  cefTRIAXone   IVPB 1 Gram(s) IV Intermittent every 24 hours  diltiazem    Tablet 30 milliGRAM(s) Oral every 6 hours  sertraline 50 milliGRAM(s) Oral daily  metoprolol 25 milliGRAM(s) Oral four times a day  tamsulosin 0.8 milliGRAM(s) Oral at bedtime  heparin  Infusion.  Unit(s)/Hr (11 mL/Hr) IV Continuous <Continuous>    MEDICATIONS  (PRN):  acetaminophen   Tablet. 650 milliGRAM(s) Oral every 6 hours PRN Mild Pain (1 - 3)  heparin  Injectable 4500 Unit(s) IV Push every 6 hours PRN For aPTT less than 40  heparin  Injectable 2000 Unit(s) IV Push every 6 hours PRN For aPTT between 40 - 57        Vital Signs Last 24 Hrs  T(C): 36.8 (12 Sep 2017 05:08), Max: 36.8 (11 Sep 2017 17:10)  T(F): 98.2 (12 Sep 2017 05:08), Max: 98.2 (11 Sep 2017 17:10)  HR: 66 (12 Sep 2017 05:08) (51 - 89)  BP: 125/71 (12 Sep 2017 05:08) (108/59 - 170/74)  BP(mean): --  RR: 16 (12 Sep 2017 05:08) (16 - 18)  SpO2: 100% (12 Sep 2017 05:08) (99% - 100%)    PHYSICAL EXAM:    ABDOMEN:  GENITALIA:    LABS:                        10.2   7.4   )-----------( 272      ( 11 Sep 2017 22:43 )             28.8     09-11    145  |  111<H>  |  76<H>  ----------------------------<  89  3.5   |  24  |  3.30<H>    Ca    8.6      11 Sep 2017 06:42      PT/INR - ( 11 Sep 2017 06:42 )   PT: 20.2 sec;   INR: 1.83 ratio     Culture - Urine (09.08.17 @ 18:50)    Specimen Source: .Urine Catheterized    Culture Results:   No growth            PTT - ( 11 Sep 2017 22:43 )  PTT:>200.0 sec        RADIOLOGY & ADDITIONAL TESTS:  EXAM:  US KIDNEY(S)                            PROCEDURE DATE:  09/11/2017          INTERPRETATION:  History of hydronephrosis.     Bilateral renal ultrasound.    The right kidney 12.9 left 11.1 cm long dimension. Moderate to severe   bilateral hydronephrosis noted, nearly symmetric. Multilevel bilateral   renal cortical cysts noted some with internal septa. The largest on the   right measures up to 5.1 x 5.8 cm. Bladder decompressed with a cyst.    Impression:    Bilateral moderate to severe hydronephrosis. Bilateral renal cysts                MARGARITA TANNER M.D., ATTENDING RADIOLOGIST  This document has been electronically signed. Sep 11 2017 12:30PM

## 2017-09-12 NOTE — DISCHARGE NOTE ADULT - PLAN OF CARE
decrease urinary retention - CT abdomen significant for bilateral renal cysts, bladder cyst and BPH  - f/u with Dr. Brownlee w/in one week of discharge - as above  - f/u with Dr. Brownlee w/ in one week of discharge - f/u cystoscopy with Dr. Brownlee w/ in one week of discharge - likely 2/2 to urinary retention and renal cysts  - f/u with Dr. Brownlee w/in one week of discharge - resume warfarin therapy  - f/u with Dr. Hernandez cardiology within 1 week of discharge - stable.  - f/u with Dr. Hernandez cardiology within 1 week of discharge - urine cultures negative  - discontinue antibiotics

## 2017-09-12 NOTE — PROGRESS NOTE ADULT - PROBLEM SELECTOR PROBLEM 3
Acute urinary retention
Acute urinary retention
Urinary retention due to benign prostatic hyperplasia

## 2017-09-12 NOTE — DISCHARGE NOTE ADULT - HOSPITAL COURSE
89yo M with PMHx of CHF, afib on warfarin, HTN, HLD, BPH, bladder neoplasm, prostate cancer s/p radiation, depression, gout presented to ED due to urinary retention. Patient was sent by Dr. Hernandez to see Dr. Brownlee due to UTI, acute renal failure, hydronephrosis from urinary retention. Patient was seen and evaluated at bedside. Stated that he was originally sent home earlier the day of admission from the ER after vega placement but was told to come back. Currently patient denied any cardiac complaints; denied headache, dizziness, chest pain, palpitations, SOB, ASHBY, nausea, or vomiting. Patient was on warfarin for his afib. Denies any hx of MI or CVA. Patient reports that he usually is able to ambulate around the house without SOB and his current exercise tolerance is at baseline. No other acute complaints today.     in the ED, pt received vega and was given 1 dose of ceftriaxone.    Patient was admitted to general medical floor.  Dr. Brownlee, urology was following this patient through out his hospital course.  Dr. Hernandez's group was consulted and following this patient for cardiology. Patient furosemide was held 2/2 KAILEE. Patient was continued on IV antibiotics for likely complicated UTI for 4 days, at which time urine cultures were negative and antibiotics were discontinued.  PSA was found to be elevated at 6.99.  Patient was evaluated by renal ultrasound and found to have moderate to severe hydronephrosis with renal cysts and CT abdomen was recommended for f/u.  C/T abdomen was significant for bilateral renal cysts and hydronephrosis.    Urology will continue to follow outpatient for cystoscopy.

## 2017-09-12 NOTE — DISCHARGE NOTE ADULT - PATIENT PORTAL LINK FT
“You can access the [facility] Patient Portal, offered by [facility], by registering with the following website: [email or web address]”

## 2017-09-12 NOTE — PROGRESS NOTE ADULT - ATTENDING COMMENTS
Agree with exam and plan as above with following: In brief, 89yo M with PMHx of CHF, afib on warfarin, HTN, HLD, BPH, bladder neoplasm, prostate cancer s/p radiation, depression, gout admitted with hematuria, urinary retention s/p vega catheter. Pt with improved hematuria restart coumadin for afib. CT done, discussed with Urology, recs appreciated. Patient discharge to home with vega cath and close outpatient urology follow up. Rest as above.
Agree with exam and plan as above with following: In brief, 89yo M with PMHx of CHF, afib on warfarin, HTN, HLD, BPH, bladder neoplasm, prostate cancer s/p radiation, depression, gout admitted with hematuria, urinary retention s/p vega catheter. Pt with improved hematuria, will start heparin drip monitor closely for bleed given need for AC due to afib. Renal US. Urology consult and recs appreciated. Rest as above.

## 2017-09-12 NOTE — PROGRESS NOTE ADULT - SUBJECTIVE AND OBJECTIVE BOX
[facility] - Alejandro Hernandez, Sandip, Alvaro, Jasmina, Brannon Negrete  Office Number:  [phone]    Patient resting comfortably in bed in NAD.  Laying flat with no respiratory distress.  No complaints of chest pain, dyspnea, palpitations, PND, or orthopnea.    MEDICATIONS  (STANDING):  diltiazem    Tablet 30 milliGRAM(s) Oral every 6 hours  sertraline 50 milliGRAM(s) Oral daily  metoprolol 25 milliGRAM(s) Oral four times a day  tamsulosin 0.8 milliGRAM(s) Oral at bedtime  warfarin 1 milliGRAM(s) Oral once    MEDICATIONS  (PRN):  acetaminophen   Tablet. 650 milliGRAM(s) Oral every 6 hours PRN Mild Pain (1 - 3)      Allergies    Hay Fever (Rhinorrhea)  No Known Drug Allergies        Vital Signs Last 24 Hrs  T(C): 36.8 (12 Sep 2017 05:08), Max: 36.8 (11 Sep 2017 17:10)  T(F): 98.2 (12 Sep 2017 05:08), Max: 98.2 (11 Sep 2017 17:10)  HR: 66 (12 Sep 2017 05:08) (51 - 89)  BP: 125/71 (12 Sep 2017 05:08) (108/59 - 170/74)  BP(mean): --  RR: 16 (12 Sep 2017 05:08) (16 - 18)  SpO2: 100% (12 Sep 2017 05:08) (99% - 100%)    I&O's Summary    11 Sep 2017 07:01  -  12 Sep 2017 07:00  --------------------------------------------------------  IN: 555 mL / OUT: 1200 mL / NET: -645 mL    12 Sep 2017 07:01  -  12 Sep 2017 10:46  --------------------------------------------------------  IN: 17 mL / OUT: 0 mL / NET: 17 mL        ON EXAM:    General: NAD, awake and alert, oriented x 3  HEENT: Mucous membranes are moist, anicteric  Lungs: Non-labored, breath sounds are clear bilaterally, No wheezing, rales or rhonchi  Cardiovascular: Irregular, S1 and S2, no murmurs, rubs, or gallops  Gastrointestinal: Bowel Sounds present, soft, nontender.   Lymph: No peripheral edema. No lymphadenopathy.  Skin: No rashes or ulcers  Psych:  Mood & affect appropriate    LABS: All Labs Reviewed:                        9.6    6.7   )-----------( 239      ( 12 Sep 2017 06:41 )             27.6                         10.2   7.4   )-----------( 272      ( 11 Sep 2017 22:43 )             28.8                         9.9    6.1   )-----------( 252      ( 11 Sep 2017 06:42 )             27.9     12 Sep 2017 06:41    145    |  113    |  64     ----------------------------<  84     3.6     |  22     |  2.90   11 Sep 2017 06:42    145    |  111    |  76     ----------------------------<  89     3.5     |  24     |  3.30   10 Sep 2017 07:04    144    |  110    |  81     ----------------------------<  83     3.4     |  23     |  3.80     Ca    8.3        12 Sep 2017 06:41  Ca    8.6        11 Sep 2017 06:42  Ca    8.3        10 Sep 2017 07:04      PT/INR - ( 12 Sep 2017 06:41 )   PT: 19.0 sec;   INR: 1.72 ratio         PTT - ( 12 Sep 2017 06:41 )  PTT:181.7 sec

## 2017-09-12 NOTE — PROGRESS NOTE ADULT - PROBLEM SELECTOR PLAN 2
Maintain Rosas. Kidney function slowly improving. Increase tamsulosin to 0.8 mg daily.
continue ceftriaxone  f/u urine culture
slowly improving with vega drainage
continue ceftriaxone  f/u urine culture
continue ceftriaxone  f/u urine culture
continue ceftriaxone  urine culture negative
d/c ceftriaxone  urine culture negative

## 2017-09-12 NOTE — DISCHARGE NOTE ADULT - CARE PROVIDER_API CALL
Cabrera Brownlee), Urology  [address] [place] [address]  Phone: [phone]  Fax: [phone]    Ramy Hernandez), Cardiovascular Disease; Internal Medicine  [address] [place] [address]  Phone: [phone]  Fax: [phone]

## 2017-09-12 NOTE — DISCHARGE NOTE ADULT - MEDICATION SUMMARY - MEDICATIONS TO STOP TAKING
I will STOP taking the medications listed below when I get home from the hospital:    furosemide 40 mg oral tablet  -- 1 tab(s) by mouth 2 times a day    cefuroxime 500 mg oral tablet  -- 1 tab(s) by mouth every 12 hours MDD:2  -- Finish all this medication unless otherwise directed by prescriber.  Medication should be taken with plenty of water.  Take with food or milk. I will STOP taking the medications listed below when I get home from the hospital:    furosemide 40 mg oral tablet  -- 1 tab(s) by mouth 2 times a day    warfarin 2 mg oral tablet  -- 1 tab(s) by mouth once a day, WEDNESDAYS ONLY    cefuroxime 500 mg oral tablet  -- 1 tab(s) by mouth every 12 hours MDD:2  -- Finish all this medication unless otherwise directed by prescriber.  Medication should be taken with plenty of water.  Take with food or milk.

## 2017-09-12 NOTE — PROGRESS NOTE ADULT - PROBLEM SELECTOR PROBLEM 4
Prostate cancer
History of bladder cancer
Prostate cancer

## 2017-09-12 NOTE — PROGRESS NOTE ADULT - SUBJECTIVE AND OBJECTIVE BOX
HPI:  89yo M with PMHx of CHF, afib on warfarin, HTN, HLD, BPH, bladder neoplasm, prostate cancer s/p radiation, depression, gout presented to ED due to urinary retention.    Patient was evaluated at bedside.  Patient reports fatigue and dislikes all of the needle sticks and tests.  Denies CP, heart palp, SOB, cough, reports no abdominal pain n/v/d.  He says that he had 2 loose stools last night.    REVIEW OF SYSTEMS:    CONSTITUTIONAL: + fatigue; No weakness, fevers or chills  EYES/ENT: No visual changes, no throat pain   RESPIRATORY: No cough, wheezing, hemoptysis; No shortness of breath  CARDIOVASCULAR: No chest pain or palpitations  GASTROINTESTINAL: + loose stools; No abdominal pain, nausea, vomiting, or hematemesis; No constipation. No melena or hematochezia.  GENITOURINARY: No dysuria, frequency or hematuria  NEUROLOGICAL: No dizziness, numbness, or weakness  SKIN: No itching, burning, rashes, or lesions   All other review of systems is negative unless indicated above.    VITAL SIGNS:    Vital Signs Last 24 Hrs  T(C): 36.8 (12 Sep 2017 05:08), Max: 36.8 (11 Sep 2017 17:10)  T(F): 98.2 (12 Sep 2017 05:08), Max: 98.2 (11 Sep 2017 17:10)  HR: 66 (12 Sep 2017 05:08) (51 - 89)  BP: 125/71 (12 Sep 2017 05:08) (108/59 - 170/74)  RR: 16 (12 Sep 2017 05:08) (16 - 18)  SpO2: 100% (12 Sep 2017 05:08) (99% - 100%)    PHYSICAL EXAM:     GENERAL: no acute distress  HEENT: NC/AT, EOMI, neck supple, MMM  RESPIRATORY: LCTAB/L, no rhonchi, rales, or wheezing  CARDIOVASCULAR: RRR, no murmurs, gallops, rubs  ABDOMINAL: soft, non-tender, distended, positive bowel sounds   EXTREMITIES: no clubbing, cyanosis, or edema  NEUROLOGICAL: alert and oriented x 3, non-focal  SKIN: no rashes or lesions   MUSCULOSKELETAL: no gross joint deformity                          9.6    6.7   )-----------( 239      ( 12 Sep 2017 06:41 )             27.6     09-12    145  |  113<H>  |  64<H>  ----------------------------<  84  3.6   |  22  |  2.90<H>    Ca    8.3<L>      12 Sep 2017 06:41        CAPILLARY BLOOD GLUCOSE          MEDICATIONS  (STANDING):  diltiazem    Tablet 30 milliGRAM(s) Oral every 6 hours  sertraline 50 milliGRAM(s) Oral daily  metoprolol 25 milliGRAM(s) Oral four times a day  tamsulosin 0.8 milliGRAM(s) Oral at bedtime  warfarin 1 milliGRAM(s) Oral once

## 2017-09-12 NOTE — PROGRESS NOTE ADULT - PROBLEM SELECTOR PROBLEM 6
Bilateral hydronephrosis
History of bladder cancer

## 2017-09-12 NOTE — PROGRESS NOTE ADULT - PROBLEM SELECTOR PROBLEM 1
Acute kidney injury superimposed on chronic kidney disease
Bilateral hydronephrosis
Complicated UTI (urinary tract infection)
Acute kidney injury superimposed on chronic kidney disease

## 2017-09-12 NOTE — PROGRESS NOTE ADULT - PROBLEM SELECTOR PLAN 5
PSA 6.9, which is elevated. This may be due to vega insertion, or to recurrance of Ca Prostate, which also may contribute to bilateral hydro

## 2017-09-12 NOTE — PROGRESS NOTE ADULT - ASSESSMENT
89yo M with PMHx of CHF, afib on warfarin, HTN, HLD, BPH, bladder neoplasm, prostate cancer s/p radiation, depression, gout is being admitted with  urinary retention, UTI  and KAILEE on CKD
89yo M with PMHx of CHF, afib on warfarin, HTN, HLD, BPH, bladder neoplasm, prostate cancer s/p radiation, depression, gout is being admitted with  urinary retention, UTI  and KAILEE on CKD
89yo M with PMHx of CHF, afib on warfarin, HTN, HLD, BPH, bladder neoplasm, prostate cancer s/p radiation, depression, gout presented to ED due to urinary retention and KAILEE on CKD
89 yo male with pmhx of a severe NICM, chronic afib, HTN, HLD, BPH, bladder neoplasm, prostate cancer s/p radiation, depression, gout presented to ED due to urinary retention, initially hypotensive     - Continue Rosas.  Follow up   - Avoid excessive volume administration  - Dose Coumadin for INR 2-3  - hold lasix for now.  WIll need to resume eventually, when renal fxn improves  - continue diltiazem 30 q6h and metoprolol 25 q6h    - off hydralazine for now given hypotension.  May need to resume eventually. bp thus far normal, not high, follow carefully  - will follow
89 yo male with pmhx of a severe NICM, chronic afib, HTN, HLD, BPH, bladder neoplasm, prostate cancer s/p radiation, depression, gout presented to ED due to urinary retention:    - Continue Rosas.  Follow up   - Avoid excessive volume administration  - Dose Coumadin for INR 2-3  - Agree with stopping heparin gtt  - hold lasix for now.  WIll need to resume eventually, when renal fxn improves  - continue diltiazem 30 q6h and metoprolol 25 q6h    - BP controlled off of hydralazine.  Continue off for now.
89yo M with PMHx of CHF, afib on warfarin, HTN, HLD, BPH, bladder neoplasm, prostate cancer s/p radiation, depression, gout presented to ED due to urinary retention and KAILEE on CKD
89yo M with PMHx of CHF, afib on warfarin, HTN, HLD, BPH, bladder neoplasm, prostate cancer s/p radiation, depression, gout presented to ED due to urinary retention and KAILEE on CKD
This is a 89 yo male with pmhx of a severe NICM, chronic afib, HTN, HLD, BPH, bladder neoplasm, prostate cancer s/p radiation, depression, gout presented to ED due to urinary retention, hypotensive earlier, now with an SBP in the 120s:    - Continue Rosas.  Follow up   - Avoid excessive volume administration  - Dose Coumadin for INR 2-3  - hold lasix for now.  WIll need to resume eventually  - continue diltiazem 30 q6h and metoprolol 25 q6h with strict hold parameters  - d/c hydralazine for now given hypotension.  May need to resume eventually.  - monitor vitals   - To follow with you.
This is a 89 yo male with pmhx of a severe NICM, chronic afib, HTN, HLD, BPH, bladder neoplasm, prostate cancer s/p radiation, depression, gout presented to ED due to urinary retention:    - Stable from a cardiac point of view  - Continue Rosas.  Follow up   - due to patient's hx of severe LV dysfunction, need to monitor I&Os closely.  Avoid excessive volume administration  - Dose Coumadin for INR 2-3  - hold lasix for now.  WIll need to resume eventually  - continue diltiazem 30 q6h.  Change metoprolol to 25 q6h.  He is on Toprol 50 BID at home, and this is the equivalent dose.  - Resume hydralazine 25 BID  - monitor vitals   - To follow with you.
89yo male w/ mult med problems including Cap, CaB admitted w/ KAILEE, UR, b/l hydro.    Cont vega  Monitor urine ouput/renal fxn  Check PSA  Outpt cysto  Repeat FRANCESCA 9/11

## 2017-09-12 NOTE — PROGRESS NOTE ADULT - PROBLEM SELECTOR PROBLEM 2
Acute kidney injury superimposed on chronic kidney disease
Acute urinary retention
Complicated UTI (urinary tract infection)
History of bladder cancer

## 2017-09-12 NOTE — PROGRESS NOTE ADULT - PROBLEM SELECTOR PLAN 1
- continue rocephin, continue tamsulin  - Cardio, Dr. Hernandez consulted recs appreciated  - Uro, Dr. Brownlee consulted in ED recs appreciated  - no supplemental IVF  - f/u consultant recs  - vega inserted, monitor urine output  - renal u/s positive for moderate-severe hydronephrosis 2/2 bilateral renal cysts  - CT positive for hydronephrosis 2/2 bilateral renal cysts, likely hemmorhagic, but unable to r/u solid mass, MRI requests by radiologist  - monitor bun/cre  - avoid nephrotoxic agents

## 2017-09-12 NOTE — DISCHARGE NOTE ADULT - MEDICATION SUMMARY - MEDICATIONS TO TAKE
I will START or STAY ON the medications listed below when I get home from the hospital:    tamsulosin 0.4 mg oral capsule  -- 2 cap(s) by mouth once a day (at bedtime)  -- Indication: For Urinary retention due to benign prostatic hyperplasia    diltiaZEM 30 mg oral tablet  -- 1 tab(s) by mouth every 6 hours  -- Indication: For Chronic atrial fibrillation    warfarin 1 mg oral tablet  -- 1.5 tab(s) by mouth once a day, except Wednesday  -- Indication: For Chronic atrial fibrillation    warfarin 2 mg oral tablet  -- 1 tab(s) by mouth once a day, WEDNESDAYS ONLY  -- Indication: For Chronic atrial fibrillation    sertraline 50 mg oral tablet  -- 1 tab(s) by mouth once a day  -- Indication: For insomnia    metoprolol tartrate 50 mg oral tablet  -- 1 tab(s) by mouth 4 times a day  -- Indication: For Chronic atrial fibrillation    hydrALAZINE  --  by mouth   -- Indication: For Chronic systolic congestive heart failure I will START or STAY ON the medications listed below when I get home from the hospital:    tamsulosin 0.4 mg oral capsule  -- 2 cap(s) by mouth once a day (at bedtime)  -- Indication: For Urinary retention due to benign prostatic hyperplasia    diltiaZEM 30 mg oral tablet  -- 1 tab(s) by mouth every 6 hours  -- Indication: For Chronic atrial fibrillation    warfarin 1 mg oral tablet  -- 1.5 tab(s) by mouth once a day, except Wednesday  -- Indication: For Chronic atrial fibrillation    sertraline 50 mg oral tablet  -- 1 tab(s) by mouth once a day  -- Indication: For insomnia    metoprolol tartrate 50 mg oral tablet  -- 1 tab(s) by mouth 4 times a day  -- Indication: For Chronic atrial fibrillation    hydrALAZINE  --  by mouth   -- Indication: For Chronic systolic congestive heart failure I will START or STAY ON the medications listed below when I get home from the hospital:    tamsulosin 0.4 mg oral capsule  -- 2 cap(s) by mouth once a day (at bedtime)  -- Indication: For Urinary retention due to benign prostatic hyperplasia    diltiaZEM 30 mg oral tablet  -- 1 tab(s) by mouth every 6 hours  -- Indication: For Chronic atrial fibrillation    warfarin 1 mg oral tablet  -- 1.5 tab(s) by mouth once a day, except Wednesday  -- Indication: For Chronic atrial fibrillation    sertraline 50 mg oral tablet  -- 1 tab(s) by mouth once a day  -- Indication: For insomnia    metoprolol tartrate 50 mg oral tablet  -- 1 tab(s) by mouth 4 times a day  -- Indication: For Chronic atrial fibrillation

## 2017-09-12 NOTE — DISCHARGE NOTE ADULT - SECONDARY DIAGNOSIS.
Bilateral hydronephrosis Acute urinary retention History of bladder cancer Acute kidney injury superimposed on chronic kidney disease Chronic atrial fibrillation Chronic systolic congestive heart failure Complicated UTI (urinary tract infection)

## 2017-09-12 NOTE — PROVIDER CONTACT NOTE (CRITICAL VALUE NOTIFICATION) - ACTION/TREATMENT ORDERED:
second call placed to Dr. Cole, Dr. Butler answered phone and aware of results and protocol being followed

## 2017-09-12 NOTE — PROGRESS NOTE ADULT - PROBLEM SELECTOR PLAN 6
This is probably due to chronic retention, but a review of sonos done here and [place] in the last year show persistent worsening of the hydro over the last year, Another etiology may be ureteral obstruction 2/2 ca prostate recurrance. Will order CT abd/pelvis w/out contrast.

## 2017-09-12 NOTE — DISCHARGE NOTE ADULT - CARE PLAN
Principal Discharge DX:	Complicated UTI (urinary tract infection)  Secondary Diagnosis:	Bilateral hydronephrosis  Secondary Diagnosis:	Acute urinary retention  Secondary Diagnosis:	History of bladder cancer  Secondary Diagnosis:	Acute kidney injury superimposed on chronic kidney disease  Secondary Diagnosis:	Chronic atrial fibrillation  Secondary Diagnosis:	Chronic systolic congestive heart failure Principal Discharge DX:	Bilateral hydronephrosis  Goal:	decrease urinary retention  Instructions for follow-up, activity and diet:	- CT abdomen significant for bilateral renal cysts, bladder cyst and BPH  - f/u with Dr. Kem nunez/in one week of discharge  Secondary Diagnosis:	Acute urinary retention  Instructions for follow-up, activity and diet:	- as above  - f/u with Dr. Kem nunez/ in one week of discharge  Secondary Diagnosis:	History of bladder cancer  Instructions for follow-up, activity and diet:	- f/u cystoscopy with Dr. Kem nunez/ in one week of discharge  Secondary Diagnosis:	Acute kidney injury superimposed on chronic kidney disease  Instructions for follow-up, activity and diet:	- likely 2/2 to urinary retention and renal cysts  - f/u with Dr. Kem nunez/in one week of discharge  Secondary Diagnosis:	Chronic atrial fibrillation  Instructions for follow-up, activity and diet:	- resume warfarin therapy  - f/u with Dr. Hernandez cardiology within 1 week of discharge  Secondary Diagnosis:	Chronic systolic congestive heart failure  Instructions for follow-up, activity and diet:	- stable.  - f/u with Dr. Hernandez cardiology within 1 week of discharge  Secondary Diagnosis:	Complicated UTI (urinary tract infection)  Instructions for follow-up, activity and diet:	- urine cultures negative  - discontinue antibiotics

## 2017-09-12 NOTE — DISCHARGE NOTE ADULT - MEDICATION SUMMARY - MEDICATIONS TO CHANGE
I will SWITCH the dose or number of times a day I take the medications listed below when I get home from the hospital:  None I will SWITCH the dose or number of times a day I take the medications listed below when I get home from the hospital:    warfarin 1 mg oral tablet  -- 1.5 tab(s) by mouth once a day, except Wednesday

## 2017-09-12 NOTE — PROGRESS NOTE ADULT - PROBLEM SELECTOR PROBLEM 5
Bilateral hydronephrosis
Prostate cancer
Chronic systolic congestive heart failure

## 2017-09-14 DIAGNOSIS — I48.91 UNSPECIFIED ATRIAL FIBRILLATION: ICD-10-CM

## 2017-09-14 DIAGNOSIS — N18.9 CHRONIC KIDNEY DISEASE, UNSPECIFIED: ICD-10-CM

## 2017-09-14 DIAGNOSIS — N39.0 URINARY TRACT INFECTION, SITE NOT SPECIFIED: ICD-10-CM

## 2017-09-14 DIAGNOSIS — I95.9 HYPOTENSION, UNSPECIFIED: ICD-10-CM

## 2017-09-14 DIAGNOSIS — Z66 DO NOT RESUSCITATE: ICD-10-CM

## 2017-09-14 DIAGNOSIS — Z92.3 PERSONAL HISTORY OF IRRADIATION: ICD-10-CM

## 2017-09-14 DIAGNOSIS — I50.22 CHRONIC SYSTOLIC (CONGESTIVE) HEART FAILURE: ICD-10-CM

## 2017-09-14 DIAGNOSIS — Z85.51 PERSONAL HISTORY OF MALIGNANT NEOPLASM OF BLADDER: ICD-10-CM

## 2017-09-14 DIAGNOSIS — T45.511A POISONING BY ANTICOAGULANTS, ACCIDENTAL (UNINTENTIONAL), INITIAL ENCOUNTER: ICD-10-CM
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
## 2017-09-14 DIAGNOSIS — N17.9 ACUTE KIDNEY FAILURE, UNSPECIFIED: ICD-10-CM

## 2017-09-14 DIAGNOSIS — Q61.02 CONGENITAL MULTIPLE RENAL CYSTS: ICD-10-CM

## 2017-09-14 DIAGNOSIS — F32.9 MAJOR DEPRESSIVE DISORDER, SINGLE EPISODE, UNSPECIFIED: ICD-10-CM

## 2017-09-14 DIAGNOSIS — C61 MALIGNANT NEOPLASM OF PROSTATE: ICD-10-CM

## 2017-09-14 DIAGNOSIS — N40.1 BENIGN PROSTATIC HYPERPLASIA WITH LOWER URINARY TRACT SYMPTOMS: ICD-10-CM

## 2017-09-14 DIAGNOSIS — N13.30 UNSPECIFIED HYDRONEPHROSIS: ICD-10-CM

## 2017-09-14 DIAGNOSIS — M10.9 GOUT, UNSPECIFIED: ICD-10-CM

## 2017-09-14 DIAGNOSIS — I13.0 HYPERTENSIVE HEART AND CHRONIC KIDNEY DISEASE WITH HEART FAILURE AND STAGE 1 THROUGH STAGE 4 CHRONIC KIDNEY DISEASE, OR UNSPECIFIED CHRONIC KIDNEY DISEASE: ICD-10-CM

## 2017-09-14 DIAGNOSIS — R33.8 OTHER RETENTION OF URINE: ICD-10-CM

## 2017-09-21 ENCOUNTER — APPOINTMENT (OUTPATIENT)
Dept: CARDIOLOGY | Facility: CLINIC | Age: 82
End: 2017-09-21
Payer: MEDICARE

## 2017-09-21 VITALS
DIASTOLIC BLOOD PRESSURE: 50 MMHG | WEIGHT: 122 LBS | SYSTOLIC BLOOD PRESSURE: 70 MMHG | HEIGHT: 65 IN | OXYGEN SATURATION: 97 % | HEART RATE: 76 BPM | BODY MASS INDEX: 20.33 KG/M2

## 2017-09-21 PROCEDURE — 99215 OFFICE O/P EST HI 40 MIN: CPT

## 2017-09-28 ENCOUNTER — APPOINTMENT (OUTPATIENT)
Dept: CARDIOLOGY | Facility: CLINIC | Age: 82
End: 2017-09-28
Payer: MEDICARE

## 2017-09-28 VITALS
SYSTOLIC BLOOD PRESSURE: 128 MMHG | HEIGHT: 65 IN | DIASTOLIC BLOOD PRESSURE: 65 MMHG | HEART RATE: 71 BPM | BODY MASS INDEX: 20.49 KG/M2 | WEIGHT: 123 LBS | OXYGEN SATURATION: 98 %

## 2017-09-28 PROCEDURE — 99215 OFFICE O/P EST HI 40 MIN: CPT

## 2017-10-03 LAB — INR PPP: 1.27

## 2017-10-04 ENCOUNTER — APPOINTMENT (OUTPATIENT)
Dept: CARDIOLOGY | Facility: CLINIC | Age: 82
End: 2017-10-04

## 2017-10-12 ENCOUNTER — APPOINTMENT (OUTPATIENT)
Dept: CARDIOLOGY | Facility: CLINIC | Age: 82
End: 2017-10-12
Payer: MEDICARE

## 2017-10-12 VITALS — HEART RATE: 76 BPM | WEIGHT: 122 LBS | HEIGHT: 65 IN | BODY MASS INDEX: 20.33 KG/M2

## 2017-10-12 VITALS
HEART RATE: 76 BPM | BODY MASS INDEX: 20.33 KG/M2 | HEIGHT: 65 IN | WEIGHT: 122 LBS | DIASTOLIC BLOOD PRESSURE: 60 MMHG | SYSTOLIC BLOOD PRESSURE: 100 MMHG | OXYGEN SATURATION: 100 %

## 2017-10-12 DIAGNOSIS — I10 ESSENTIAL (PRIMARY) HYPERTENSION: ICD-10-CM

## 2017-10-12 LAB
INR PPP: 2.1 RATIO
QUALITY CONTROL: YES

## 2017-10-12 PROCEDURE — 85610 PROTHROMBIN TIME: CPT | Mod: QW

## 2017-10-12 PROCEDURE — 99214 OFFICE O/P EST MOD 30 MIN: CPT

## 2017-10-19 PROCEDURE — 99285 EMERGENCY DEPT VISIT HI MDM: CPT | Mod: 25

## 2017-10-19 PROCEDURE — 83735 ASSAY OF MAGNESIUM: CPT

## 2017-10-19 PROCEDURE — 90686 IIV4 VACC NO PRSV 0.5 ML IM: CPT

## 2017-10-19 PROCEDURE — 96374 THER/PROPH/DIAG INJ IV PUSH: CPT

## 2017-10-19 PROCEDURE — 74176 CT ABD & PELVIS W/O CONTRAST: CPT

## 2017-10-19 PROCEDURE — 85730 THROMBOPLASTIN TIME PARTIAL: CPT

## 2017-10-19 PROCEDURE — 80048 BASIC METABOLIC PNL TOTAL CA: CPT

## 2017-10-19 PROCEDURE — 93005 ELECTROCARDIOGRAM TRACING: CPT

## 2017-10-19 PROCEDURE — 76775 US EXAM ABDO BACK WALL LIM: CPT

## 2017-10-19 PROCEDURE — 80053 COMPREHEN METABOLIC PANEL: CPT

## 2017-10-19 PROCEDURE — 83605 ASSAY OF LACTIC ACID: CPT

## 2017-10-19 PROCEDURE — 85027 COMPLETE CBC AUTOMATED: CPT

## 2017-10-19 PROCEDURE — 85610 PROTHROMBIN TIME: CPT

## 2017-10-19 PROCEDURE — G0103: CPT

## 2017-10-27 LAB — INR PPP: 3.29

## 2017-11-12 LAB — INR PPP: 3.1

## 2017-11-21 ENCOUNTER — APPOINTMENT (OUTPATIENT)
Dept: CARDIOLOGY | Facility: CLINIC | Age: 82
End: 2017-11-21
Payer: MEDICARE

## 2017-11-21 VITALS
HEIGHT: 65 IN | WEIGHT: 125 LBS | SYSTOLIC BLOOD PRESSURE: 90 MMHG | OXYGEN SATURATION: 94 % | HEART RATE: 82 BPM | DIASTOLIC BLOOD PRESSURE: 52 MMHG | BODY MASS INDEX: 20.83 KG/M2

## 2017-11-21 PROCEDURE — 99214 OFFICE O/P EST MOD 30 MIN: CPT

## 2017-12-19 ENCOUNTER — APPOINTMENT (OUTPATIENT)
Dept: CARDIOLOGY | Facility: CLINIC | Age: 82
End: 2017-12-19
Payer: MEDICARE

## 2017-12-19 VITALS
WEIGHT: 125.03 LBS | HEART RATE: 69 BPM | HEIGHT: 65 IN | SYSTOLIC BLOOD PRESSURE: 84 MMHG | BODY MASS INDEX: 20.83 KG/M2 | DIASTOLIC BLOOD PRESSURE: 50 MMHG | OXYGEN SATURATION: 99 %

## 2017-12-19 PROCEDURE — 99214 OFFICE O/P EST MOD 30 MIN: CPT

## 2017-12-22 LAB — INR PPP: 2.29

## 2018-01-12 LAB — INR PPP: 2.37

## 2018-02-19 ENCOUNTER — APPOINTMENT (OUTPATIENT)
Dept: CARDIOLOGY | Facility: CLINIC | Age: 83
End: 2018-02-19
Payer: MEDICARE

## 2018-02-19 ENCOUNTER — MEDICATION RENEWAL (OUTPATIENT)
Age: 83
End: 2018-02-19

## 2018-02-19 VITALS
BODY MASS INDEX: 20.99 KG/M2 | HEIGHT: 65 IN | SYSTOLIC BLOOD PRESSURE: 91 MMHG | OXYGEN SATURATION: 99 % | WEIGHT: 126 LBS | HEART RATE: 72 BPM | DIASTOLIC BLOOD PRESSURE: 59 MMHG

## 2018-02-19 PROCEDURE — 99214 OFFICE O/P EST MOD 30 MIN: CPT

## 2018-03-05 ENCOUNTER — MESSAGE (OUTPATIENT)
Age: 83
End: 2018-03-05

## 2018-03-14 LAB — INR PPP: 1.7

## 2018-03-28 LAB — INR PPP: 2

## 2018-04-02 ENCOUNTER — APPOINTMENT (OUTPATIENT)
Dept: CARDIOLOGY | Facility: CLINIC | Age: 83
End: 2018-04-02
Payer: MEDICARE

## 2018-04-02 VITALS
BODY MASS INDEX: 21.63 KG/M2 | OXYGEN SATURATION: 81 % | HEART RATE: 75 BPM | DIASTOLIC BLOOD PRESSURE: 72 MMHG | WEIGHT: 130 LBS | SYSTOLIC BLOOD PRESSURE: 126 MMHG

## 2018-04-02 PROCEDURE — 99214 OFFICE O/P EST MOD 30 MIN: CPT

## 2018-04-18 LAB — INR PPP: 1.7

## 2018-04-23 ENCOUNTER — APPOINTMENT (OUTPATIENT)
Dept: CARDIOLOGY | Facility: CLINIC | Age: 83
End: 2018-04-23
Payer: MEDICARE

## 2018-04-23 PROCEDURE — 93306 TTE W/DOPPLER COMPLETE: CPT

## 2018-05-08 ENCOUNTER — CHART COPY (OUTPATIENT)
Age: 83
End: 2018-05-08

## 2018-05-08 LAB — INR PPP: 2.3

## 2018-05-30 LAB — INR PPP: 2.5

## 2018-06-11 ENCOUNTER — APPOINTMENT (OUTPATIENT)
Dept: CARDIOLOGY | Facility: CLINIC | Age: 83
End: 2018-06-11
Payer: MEDICARE

## 2018-06-11 VITALS
WEIGHT: 130 LBS | HEART RATE: 67 BPM | BODY MASS INDEX: 21.63 KG/M2 | OXYGEN SATURATION: 99 % | DIASTOLIC BLOOD PRESSURE: 60 MMHG | SYSTOLIC BLOOD PRESSURE: 100 MMHG

## 2018-06-11 PROCEDURE — 99214 OFFICE O/P EST MOD 30 MIN: CPT

## 2018-06-27 LAB — INR PPP: 1.8

## 2018-07-19 LAB — INR PPP: 2

## 2018-08-07 ENCOUNTER — APPOINTMENT (OUTPATIENT)
Dept: CARDIOLOGY | Facility: CLINIC | Age: 83
End: 2018-08-07
Payer: MEDICARE

## 2018-08-07 VITALS
BODY MASS INDEX: 21.66 KG/M2 | DIASTOLIC BLOOD PRESSURE: 60 MMHG | HEIGHT: 65 IN | OXYGEN SATURATION: 99 % | HEART RATE: 90 BPM | SYSTOLIC BLOOD PRESSURE: 90 MMHG | WEIGHT: 130 LBS

## 2018-08-07 PROCEDURE — 99214 OFFICE O/P EST MOD 30 MIN: CPT

## 2018-08-08 ENCOUNTER — RX RENEWAL (OUTPATIENT)
Age: 83
End: 2018-08-08

## 2018-09-14 ENCOUNTER — RESULT REVIEW (OUTPATIENT)
Age: 83
End: 2018-09-14

## 2018-09-16 LAB — INR PPP: 2.7

## 2018-09-24 ENCOUNTER — RX RENEWAL (OUTPATIENT)
Age: 83
End: 2018-09-24

## 2018-10-08 ENCOUNTER — APPOINTMENT (OUTPATIENT)
Dept: CARDIOLOGY | Facility: CLINIC | Age: 83
End: 2018-10-08
Payer: MEDICARE

## 2018-10-08 VITALS
SYSTOLIC BLOOD PRESSURE: 96 MMHG | HEIGHT: 65 IN | HEART RATE: 92 BPM | OXYGEN SATURATION: 99 % | DIASTOLIC BLOOD PRESSURE: 60 MMHG

## 2018-10-08 DIAGNOSIS — I42.9 CARDIOMYOPATHY, UNSPECIFIED: ICD-10-CM

## 2018-10-08 DIAGNOSIS — R53.83 OTHER FATIGUE: ICD-10-CM

## 2018-10-08 PROCEDURE — 99214 OFFICE O/P EST MOD 30 MIN: CPT

## 2018-10-13 LAB — INR PPP: 3.6

## 2018-10-26 ENCOUNTER — RESULT REVIEW (OUTPATIENT)
Age: 83
End: 2018-10-26

## 2018-10-28 LAB — INR PPP: 2.2

## 2018-10-29 ENCOUNTER — MEDICATION RENEWAL (OUTPATIENT)
Age: 83
End: 2018-10-29

## 2018-11-16 ENCOUNTER — RESULT REVIEW (OUTPATIENT)
Age: 83
End: 2018-11-16

## 2018-11-16 LAB — INR PPP: 2.4

## 2018-11-18 LAB — INR PPP: 2.4

## 2018-12-11 ENCOUNTER — APPOINTMENT (OUTPATIENT)
Dept: CARDIOLOGY | Facility: CLINIC | Age: 83
End: 2018-12-11
Payer: MEDICARE

## 2018-12-11 ENCOUNTER — NON-APPOINTMENT (OUTPATIENT)
Age: 83
End: 2018-12-11

## 2018-12-11 VITALS — SYSTOLIC BLOOD PRESSURE: 92 MMHG | DIASTOLIC BLOOD PRESSURE: 56 MMHG | HEART RATE: 71 BPM | HEIGHT: 65 IN

## 2018-12-11 DIAGNOSIS — I50.9 HEART FAILURE, UNSPECIFIED: ICD-10-CM

## 2018-12-11 PROCEDURE — 93000 ELECTROCARDIOGRAM COMPLETE: CPT

## 2018-12-11 PROCEDURE — 99214 OFFICE O/P EST MOD 30 MIN: CPT

## 2018-12-11 NOTE — REASON FOR VISIT
[Follow-Up - From Hospitalization] : follow-up of a recent hospitalization for [Atrial Fibrillation] : atrial fibrillation [Dyspnea] : dyspnea [Heart Failure] : congestive heart failure [Hyperlipidemia] : hyperlipidemia [Hypertension] : hypertension

## 2018-12-11 NOTE — REVIEW OF SYSTEMS
[Feeling Fatigued] : feeling fatigued [Loss Of Hearing] : hearing loss [Nocturia] : nocturia [Negative] : Gastrointestinal [Fever] : no fever [Headache] : no headache [Chills] : no chills [Blurry Vision] : no blurred vision [Seeing Double (Diplopia)] : no diplopia [Earache] : no earache [Sore Throat] : no sore throat [Sinus Pressure] : no sinus pressure [Shortness Of Breath] : no shortness of breath [Chest Pain] : no chest pain [Palpitations] : no palpitations [Joint Pain] : no joint pain [Depression] : no depression [Anxiety] : no anxiety [Excessive Thirst] : no polydipsia [Easy Bleeding] : no tendency for easy bleeding [Easy Bruising] : no tendency for easy bruising

## 2018-12-11 NOTE — PHYSICAL EXAM
[General Appearance - In No Acute Distress] : no acute distress [Normal Conjunctiva] : the conjunctiva exhibited no abnormalities [Normal Oral Mucosa] : normal oral mucosa [Normal Jugular Venous V Waves Present] : normal jugular venous V waves present [Respiration, Rhythm And Depth] : normal respiratory rhythm and effort [Exaggerated Use Of Accessory Muscles For Inspiration] : no accessory muscle use [Auscultation Breath Sounds / Voice Sounds] : lungs were clear to auscultation bilaterally [Bowel Sounds] : normal bowel sounds [Abdomen Soft] : soft [Abdomen Tenderness] : non-tender [Nail Clubbing] : no clubbing of the fingernails [Cyanosis, Localized] : no localized cyanosis [Skin Color & Pigmentation] : normal skin color and pigmentation [Skin Turgor] : normal skin turgor [] : no rash [Oriented To Time, Place, And Person] : oriented to person, place, and time [Impaired Insight] : insight and judgment were intact [Not Palpable] : not palpable [No Precordial Heave] : no precordial heave was noted [Normal Rate] : normal [Irregularly Irregular] : irregularly irregular [Normal S1] : normal S1 [Normal S2] : normal S2 [No Gallop] : no gallop heard [II] : a grade 2 [2+] : left 2+ [No Abnormalities] : the abdominal aorta was not enlarged and no bruit was heard [No Pitting Edema] : no pitting edema present [FreeTextEntry1] : Big toe on right foot is painful and red [Apical Thrill] : no thrill palpable at the apex [Right Carotid Bruit] : no bruit heard over the right carotid [Left Carotid Bruit] : no bruit heard over the left carotid

## 2018-12-11 NOTE — HISTORY OF PRESENT ILLNESS
[FreeTextEntry1] : This is a 89 year old man with a nonischemic cardiomyopathy, and chronic atrial fibrillation, hypertension, and hyperlipidemia who presents to the office for follow-up. He had an admission to [facility], 8/15  He was admitted with hematuria and urinary retention. His Coumadin was initially stopped. He also developed some orthostatic hypotension and his medications were reduced.\par \par He was last in the hospital 10/16 with severe congestive heart failure. That time his echocardiogram showed ejection fraction of only 15%. 2+ MR 3+ TR with a PA pressure of 65. He is actually placed on hospice.\par \par At home on his medications he has done remarkably well and he was taken out of hospice care. He does have weakness of his left leg and walks with a walker. Otherwise he's had no symptoms of chest pain, shortness of breath, palpitation, lightheadedness. He has been eating well and maintaining his weight.\par \par We have tried in the past to cut back on his diuretic without success. He was going into acute renal failure.\par \par The patient was sent for a renal ultrasound which showed bilateral severe hydronephrosis. He was hospitalized and had a Rosas catheter placed. His Lasix was discontinued. Chest x-ray showed no vascular congestion. \par \par At home the patient had been very lethargic. He is eating and drinking okay but feels fatigued. He is passing a good amount of urine and his weight is stable. He has no shortness of breath or any evidence of pulmonary congestion.Most recent blood work performed 10/18 demonstrates a stable creatinine of 2.32 with a BUN of 40. Potassium 4.0. Hemoglobin 12.9 with hematocrit of 39.\par \par His blood pressure has been relatively low but he is asymptomatic except for fatigue and the fact that he sleeps most of the day,. His appetite has been okay. He is not able to walk because of weakness but is not dizzy, or short breath. On the warfarin is in no bleeding events.\par \par ECG shows atrial fibrillation with a controlled ventricular rate. There is now a right bundle branch block with persistent left axis deviation.\par

## 2018-12-11 NOTE — DISCUSSION/SUMMARY
[FreeTextEntry1] : The patient continues to do well. His blood pressure remains low normal without any symptoms of dizziness. His kidney function is stable and CBC stable. He has no evidence for fluid retention.\par \par He will stay on his present medication. If he has any symptoms or problems they will call me. He'll go for repeat carotid Doppler in the spring. I will see him in 3 months

## 2018-12-14 LAB — INR PPP: 2.6

## 2018-12-17 ENCOUNTER — RX RENEWAL (OUTPATIENT)
Age: 83
End: 2018-12-17

## 2018-12-17 ENCOUNTER — MEDICATION RENEWAL (OUTPATIENT)
Age: 83
End: 2018-12-17

## 2018-12-17 RX ORDER — WARFARIN 1 MG/1
1 TABLET ORAL
Qty: 180 | Refills: 3 | Status: ACTIVE | COMMUNITY
Start: 2017-10-27 | End: 1900-01-01

## 2019-01-01 NOTE — PROGRESS NOTE ADULT - PROBLEM SELECTOR PROBLEM 9
Prophylactic measure
none

## 2019-01-03 ENCOUNTER — MEDICATION RENEWAL (OUTPATIENT)
Age: 84
End: 2019-01-03

## 2019-01-11 LAB — INR PPP: 2

## 2019-02-21 ENCOUNTER — APPOINTMENT (OUTPATIENT)
Dept: CARDIOLOGY | Facility: CLINIC | Age: 84
End: 2019-02-21
Payer: MEDICARE

## 2019-02-21 PROCEDURE — 93880 EXTRACRANIAL BILAT STUDY: CPT

## 2019-02-22 ENCOUNTER — MEDICATION RENEWAL (OUTPATIENT)
Age: 84
End: 2019-02-22

## 2019-02-22 LAB — INR PPP: 3.2

## 2019-02-27 LAB
INR PPP: 2.47
INR PPP: 2.97
INR PPP: 3.15
INR PPP: 3.86

## 2019-03-13 ENCOUNTER — APPOINTMENT (OUTPATIENT)
Dept: CARDIOLOGY | Facility: CLINIC | Age: 84
End: 2019-03-13
Payer: MEDICARE

## 2019-03-13 VITALS
SYSTOLIC BLOOD PRESSURE: 97 MMHG | HEIGHT: 65 IN | OXYGEN SATURATION: 97 % | DIASTOLIC BLOOD PRESSURE: 64 MMHG | HEART RATE: 96 BPM

## 2019-03-13 DIAGNOSIS — N18.4 CHRONIC KIDNEY DISEASE, STAGE 4 (SEVERE): ICD-10-CM

## 2019-03-13 LAB
INR PPP: 3.3 RATIO
QUALITY CONTROL: YES

## 2019-03-13 PROCEDURE — 99214 OFFICE O/P EST MOD 30 MIN: CPT

## 2019-03-13 PROCEDURE — 85610 PROTHROMBIN TIME: CPT | Mod: QW

## 2019-03-13 RX ORDER — SERTRALINE 25 MG/1
25 TABLET, FILM COATED ORAL
Qty: 90 | Refills: 3 | Status: ACTIVE | COMMUNITY
Start: 1900-01-01 | End: 1900-01-01

## 2019-03-13 NOTE — HISTORY OF PRESENT ILLNESS
[FreeTextEntry1] : This is a 89 year old man with a nonischemic cardiomyopathy, and chronic atrial fibrillation, hypertension, and hyperlipidemia who presents to the office for follow-up. He had an admission to [facility], 8/15  He was admitted with hematuria and urinary retention. His Coumadin was initially stopped. He also developed some orthostatic hypotension and his medications were reduced.\par \par He was last in the hospital 10/16 with severe congestive heart failure. That time his echocardiogram showed ejection fraction of only 15%. 2+ MR 3+ TR with a PA pressure of 65. He is actually placed on hospice.\par \par At home on his medications he has done remarkably well and he was taken out of hospice care. He does have weakness of his left leg and walks with a walker. Otherwise he's had no symptoms of chest pain, shortness of breath, palpitation, lightheadedness. He has been eating well and maintaining his weight.\par \par We have tried in the past to cut back on his diuretic without success. He was going into acute renal failure.\par \par The patient was sent for a renal ultrasound which showed bilateral severe hydronephrosis. He was hospitalized and had a Rosas catheter placed. His Lasix was discontinued. Chest x-ray showed no vascular congestion. \par \par At home the patient had been very lethargic. He is eating and drinking okay but feels fatigued. He is passing a good amount of urine and his weight is stable. He has no shortness of breath or any evidence of pulmonary congestion.Most recent blood work performed 10/18 demonstrates a stable creatinine of 2.32 with a BUN of 40. Potassium 4.0. Hemoglobin 12.9 with hematocrit of 39.\par \par His blood pressure has been relatively low but he is asymptomatic except for fatigue and the fact that he sleeps most of the day,. His appetite has been okay. He is not able to walk because of weakness but is not dizzy, or short breath. On the warfarin is in no bleeding events.\par \par He had a repeat carotid Doppler shows just a mild to moderate plaque.\par \par \par

## 2019-03-13 NOTE — DISCUSSION/SUMMARY
[FreeTextEntry1] : The patient continues to do remarkably well. He is rate controlled atrial fibrillation and on his medication blood pressure, heart rate, and volume status remains stable.\par \par He'll go for general blood work.\par \par Try changing the metoprolol from 25 mg 4 times a day, to 50 mg twice a day. I'm concerned that if we get too big of a dose at one time, that the maximal effect may make him symptomatically hypotensive.\par \par If all is well I will see him in 3 months.

## 2019-03-28 ENCOUNTER — MEDICATION RENEWAL (OUTPATIENT)
Age: 84
End: 2019-03-28

## 2019-03-28 LAB
INR PPP: 1.6 RATIO
QUALITY CONTROL: YES

## 2019-04-02 LAB
INR PPP: 2.1 RATIO
QUALITY CONTROL: YES

## 2019-04-17 ENCOUNTER — CHART COPY (OUTPATIENT)
Age: 84
End: 2019-04-17

## 2019-04-17 LAB
INR PPP: 3 RATIO
QUALITY CONTROL: YES

## 2019-05-06 LAB — INR PPP: 2.1

## 2019-05-22 LAB — INR PPP: 2.2

## 2019-05-29 ENCOUNTER — MEDICATION RENEWAL (OUTPATIENT)
Age: 84
End: 2019-05-29

## 2019-06-03 LAB — INR PPP: 2.1

## 2019-06-05 ENCOUNTER — APPOINTMENT (OUTPATIENT)
Dept: CARDIOLOGY | Facility: CLINIC | Age: 84
End: 2019-06-05
Payer: MEDICARE

## 2019-06-05 VITALS
HEIGHT: 65 IN | DIASTOLIC BLOOD PRESSURE: 59 MMHG | HEART RATE: 74 BPM | WEIGHT: 133 LBS | OXYGEN SATURATION: 98 % | BODY MASS INDEX: 22.16 KG/M2 | SYSTOLIC BLOOD PRESSURE: 103 MMHG

## 2019-06-05 VITALS — DIASTOLIC BLOOD PRESSURE: 60 MMHG | SYSTOLIC BLOOD PRESSURE: 90 MMHG

## 2019-06-05 DIAGNOSIS — E78.5 HYPERLIPIDEMIA, UNSPECIFIED: ICD-10-CM

## 2019-06-05 DIAGNOSIS — I48.91 UNSPECIFIED ATRIAL FIBRILLATION: ICD-10-CM

## 2019-06-05 DIAGNOSIS — N18.3 CHRONIC KIDNEY DISEASE, STAGE 3 (MODERATE): ICD-10-CM

## 2019-06-05 DIAGNOSIS — I95.9 HYPOTENSION, UNSPECIFIED: ICD-10-CM

## 2019-06-05 PROCEDURE — 99214 OFFICE O/P EST MOD 30 MIN: CPT

## 2019-06-06 NOTE — PHYSICAL EXAM
[Normal Conjunctiva] : the conjunctiva exhibited no abnormalities [General Appearance - In No Acute Distress] : no acute distress [Normal Oral Mucosa] : normal oral mucosa [Normal Jugular Venous V Waves Present] : normal jugular venous V waves present [Respiration, Rhythm And Depth] : normal respiratory rhythm and effort [Exaggerated Use Of Accessory Muscles For Inspiration] : no accessory muscle use [Auscultation Breath Sounds / Voice Sounds] : lungs were clear to auscultation bilaterally [Bowel Sounds] : normal bowel sounds [Abdomen Tenderness] : non-tender [Abdomen Soft] : soft [Nail Clubbing] : no clubbing of the fingernails [Cyanosis, Localized] : no localized cyanosis [Skin Color & Pigmentation] : normal skin color and pigmentation [] : no rash [Skin Turgor] : normal skin turgor [Not Palpable] : not palpable [Oriented To Time, Place, And Person] : oriented to person, place, and time [Impaired Insight] : insight and judgment were intact [No Precordial Heave] : no precordial heave was noted [Normal Rate] : normal [Irregularly Irregular] : irregularly irregular [Normal S1] : normal S1 [Normal S2] : normal S2 [No Gallop] : no gallop heard [II] : a grade 2 [No Abnormalities] : the abdominal aorta was not enlarged and no bruit was heard [2+] : left 2+ [No Pitting Edema] : no pitting edema present [FreeTextEntry1] : Big toe on right foot is painful and red [Apical Thrill] : no thrill palpable at the apex [Right Carotid Bruit] : no bruit heard over the right carotid [Left Carotid Bruit] : no bruit heard over the left carotid

## 2019-06-06 NOTE — REVIEW OF SYSTEMS
[Feeling Fatigued] : feeling fatigued [Loss Of Hearing] : hearing loss [Nocturia] : nocturia [Negative] : Gastrointestinal [Fever] : no fever [Headache] : no headache [Chills] : no chills [Blurry Vision] : no blurred vision [Earache] : no earache [Seeing Double (Diplopia)] : no diplopia [Sore Throat] : no sore throat [Sinus Pressure] : no sinus pressure [Shortness Of Breath] : no shortness of breath [Chest Pain] : no chest pain [Palpitations] : no palpitations [Joint Pain] : no joint pain [Depression] : no depression [Anxiety] : no anxiety [Excessive Thirst] : no polydipsia [Easy Bleeding] : no tendency for easy bleeding [Easy Bruising] : no tendency for easy bruising

## 2019-06-06 NOTE — DISCUSSION/SUMMARY
[FreeTextEntry1] : The patient's cardiac status remained stable. On his medication his blood pressure is reasonable, volume status is excellent, he is having no chest pain or shortness of breath. His kidney function is also stable.\par \par He will stay on his present medication. If he has any symptoms or problems his wife would call me. I would see him in 3 months.\par \par He did go over his recent carotid Doppler and  his blood work and his medication list.

## 2019-06-06 NOTE — HISTORY OF PRESENT ILLNESS
[FreeTextEntry1] : This is a 89 year old man with a nonischemic cardiomyopathy, and chronic atrial fibrillation, hypertension, and hyperlipidemia who presents to the office for follow-up. He had an admission to [facility], 8/15  He was admitted with hematuria and urinary retention. His Coumadin was initially stopped. He also developed some orthostatic hypotension and his medications were reduced.\par \par He was last in the hospital 10/16 with severe congestive heart failure. That time his echocardiogram showed ejection fraction of only 15%. 2+ MR 3+ TR with a PA pressure of 65. He is actually placed on hospice.\par \par At home on his medications he has done remarkably well and he was taken out of hospice care. He does have weakness of his left leg and walks with a walker. Otherwise he's had no symptoms of chest pain, shortness of breath, palpitation, lightheadedness. He has been eating well and maintaining his weight.\par \par We have tried in the past to cut back on his diuretic without success. He was going into acute renal failure.\par \par The patient was sent for a renal ultrasound which showed bilateral severe hydronephrosis. He was hospitalized and had a Rosas catheter placed. His Lasix was discontinued. Chest x-ray showed no vascular congestion. \par \par At home the patient had been very lethargic. He is eating and drinking okay but feels fatigued. He is passing a good amount of urine and his weight is stable. He has no shortness of breath or any evidence of pulmonary congestion. Blood work remains stable. 4/19, creatinine 2.1, BUN 31, potassium 4.4. Globin 14 with hematocrit of 46. EDC of 2.2. BNP was markedly elevated at 5000. PTH was increased at 197.\par \par His blood pressure has been relatively low but he is asymptomatic except for fatigue and the fact that he sleeps most of the day,. His appetite has been okay. He is not able to walk because of weakness but is not dizzy, or short breath. On the warfarin is in no bleeding events.\par \par He had a repeat carotid Doppler 2/19 shows just a mild to moderate plaque, which is stable.\par \par \par

## 2019-06-20 ENCOUNTER — OTHER (OUTPATIENT)
Age: 84
End: 2019-06-20

## 2019-06-20 LAB — INR PPP: 3

## 2019-06-23 NOTE — PROGRESS NOTE ADULT - PROVIDER SPECIALTY LIST ADULT
Cardiology
Hospitalist
Urology
Urology
38 years old male by ems c/o nausea vomiting and intermittent diffused abd pain since this morning. Pt sts he has a hx of gastritis and he needs dilaudid for pain. pt denies recent hx of traveling, trauma,  headache, dizziness, blurred visions, light sensitivities, focal/distal weakness or numbness, cough, sob, chest pain, fever, chills, dysuria or irregular bowel movements.
Urology
Hospitalist

## 2019-07-15 LAB — INR PPP: 1.9

## 2019-08-12 ENCOUNTER — RX RENEWAL (OUTPATIENT)
Age: 84
End: 2019-08-12

## 2019-08-13 ENCOUNTER — OTHER (OUTPATIENT)
Age: 84
End: 2019-08-13

## 2019-08-13 LAB — INR PPP: 2.4

## 2019-09-04 RX ORDER — FUROSEMIDE 40 MG/1
40 TABLET ORAL
Qty: 90 | Refills: 3 | Status: ACTIVE | COMMUNITY
Start: 1900-01-01 | End: 1900-01-01

## 2019-09-09 ENCOUNTER — OTHER (OUTPATIENT)
Age: 84
End: 2019-09-09

## 2019-09-11 ENCOUNTER — OTHER (OUTPATIENT)
Age: 84
End: 2019-09-11

## 2019-09-11 LAB — INR PPP: 2.2

## 2019-10-09 ENCOUNTER — OTHER (OUTPATIENT)
Age: 84
End: 2019-10-09

## 2019-10-09 LAB — INR PPP: 5.4

## 2019-10-14 ENCOUNTER — INPATIENT (INPATIENT)
Facility: HOSPITAL | Age: 84
LOS: 4 days | Discharge: HOSPICE | DRG: 698 | End: 2019-10-19
Attending: FAMILY MEDICINE | Admitting: STUDENT IN AN ORGANIZED HEALTH CARE EDUCATION/TRAINING PROGRAM
Payer: OTHER MISCELLANEOUS

## 2019-10-14 VITALS
RESPIRATION RATE: 20 BRPM | WEIGHT: 130.07 LBS | DIASTOLIC BLOOD PRESSURE: 88 MMHG | HEART RATE: 98 BPM | SYSTOLIC BLOOD PRESSURE: 138 MMHG | HEIGHT: 64 IN

## 2019-10-14 LAB
ALBUMIN SERPL ELPH-MCNC: 2.1 G/DL — LOW (ref 3.3–5)
ALP SERPL-CCNC: 131 U/L — HIGH (ref 40–120)
ALT FLD-CCNC: 17 U/L — SIGNIFICANT CHANGE UP (ref 12–78)
ANION GAP SERPL CALC-SCNC: 11 MMOL/L — SIGNIFICANT CHANGE UP (ref 5–17)
AST SERPL-CCNC: 21 U/L — SIGNIFICANT CHANGE UP (ref 15–37)
BASOPHILS # BLD AUTO: 0 K/UL — SIGNIFICANT CHANGE UP (ref 0–0.2)
BASOPHILS NFR BLD AUTO: 0 % — SIGNIFICANT CHANGE UP (ref 0–2)
BILIRUB SERPL-MCNC: 0.3 MG/DL — SIGNIFICANT CHANGE UP (ref 0.2–1.2)
BUN SERPL-MCNC: 115 MG/DL — HIGH (ref 7–23)
CALCIUM SERPL-MCNC: 8.5 MG/DL — SIGNIFICANT CHANGE UP (ref 8.5–10.1)
CHLORIDE SERPL-SCNC: 97 MMOL/L — SIGNIFICANT CHANGE UP (ref 96–108)
CO2 SERPL-SCNC: 19 MMOL/L — LOW (ref 22–31)
CREAT SERPL-MCNC: 4.6 MG/DL — HIGH (ref 0.5–1.3)
EOSINOPHIL # BLD AUTO: 0 K/UL — SIGNIFICANT CHANGE UP (ref 0–0.5)
EOSINOPHIL NFR BLD AUTO: 0 % — SIGNIFICANT CHANGE UP (ref 0–6)
GLUCOSE SERPL-MCNC: 121 MG/DL — HIGH (ref 70–99)
HCT VFR BLD CALC: 34.7 % — LOW (ref 39–50)
HGB BLD-MCNC: 11.7 G/DL — LOW (ref 13–17)
LACTATE SERPL-SCNC: 2 MMOL/L — SIGNIFICANT CHANGE UP (ref 0.7–2)
LYMPHOCYTES # BLD AUTO: 0.41 K/UL — LOW (ref 1–3.3)
LYMPHOCYTES # BLD AUTO: 2 % — LOW (ref 13–44)
MANUAL SMEAR VERIFICATION: SIGNIFICANT CHANGE UP
MCHC RBC-ENTMCNC: 31.4 PG — SIGNIFICANT CHANGE UP (ref 27–34)
MCHC RBC-ENTMCNC: 33.7 GM/DL — SIGNIFICANT CHANGE UP (ref 32–36)
MCV RBC AUTO: 93 FL — SIGNIFICANT CHANGE UP (ref 80–100)
MONOCYTES # BLD AUTO: 0.83 K/UL — SIGNIFICANT CHANGE UP (ref 0–0.9)
MONOCYTES NFR BLD AUTO: 4 % — SIGNIFICANT CHANGE UP (ref 2–14)
NEUTROPHILS # BLD AUTO: 19.19 K/UL — HIGH (ref 1.8–7.4)
NEUTROPHILS NFR BLD AUTO: 93 % — HIGH (ref 43–77)
NRBC # BLD: 0 — SIGNIFICANT CHANGE UP
NRBC # BLD: SIGNIFICANT CHANGE UP /100 WBCS (ref 0–0)
PLAT MORPH BLD: NORMAL — SIGNIFICANT CHANGE UP
PLATELET # BLD AUTO: 328 K/UL — SIGNIFICANT CHANGE UP (ref 150–400)
POTASSIUM SERPL-MCNC: 5.3 MMOL/L — SIGNIFICANT CHANGE UP (ref 3.5–5.3)
POTASSIUM SERPL-SCNC: 5.3 MMOL/L — SIGNIFICANT CHANGE UP (ref 3.5–5.3)
PROT SERPL-MCNC: 6.5 G/DL — SIGNIFICANT CHANGE UP (ref 6–8.3)
RBC # BLD: 3.73 M/UL — LOW (ref 4.2–5.8)
RBC # FLD: 14.3 % — SIGNIFICANT CHANGE UP (ref 10.3–14.5)
RBC BLD AUTO: NORMAL — SIGNIFICANT CHANGE UP
SODIUM SERPL-SCNC: 127 MMOL/L — LOW (ref 135–145)
VARIANT LYMPHS # BLD: 1 % — SIGNIFICANT CHANGE UP (ref 0–6)
WBC # BLD: 20.63 K/UL — HIGH (ref 3.8–10.5)
WBC # FLD AUTO: 20.63 K/UL — HIGH (ref 3.8–10.5)

## 2019-10-14 PROCEDURE — 99223 1ST HOSP IP/OBS HIGH 75: CPT | Mod: GC

## 2019-10-14 PROCEDURE — 99285 EMERGENCY DEPT VISIT HI MDM: CPT

## 2019-10-14 PROCEDURE — 76857 US EXAM PELVIC LIMITED: CPT | Mod: 26

## 2019-10-14 PROCEDURE — 71045 X-RAY EXAM CHEST 1 VIEW: CPT | Mod: 26

## 2019-10-14 PROCEDURE — 93010 ELECTROCARDIOGRAM REPORT: CPT

## 2019-10-14 RX ORDER — CEFTRIAXONE 500 MG/1
1000 INJECTION, POWDER, FOR SOLUTION INTRAMUSCULAR; INTRAVENOUS ONCE
Refills: 0 | Status: COMPLETED | OUTPATIENT
Start: 2019-10-14 | End: 2019-10-14

## 2019-10-14 RX ORDER — SODIUM CHLORIDE 9 MG/ML
1000 INJECTION INTRAMUSCULAR; INTRAVENOUS; SUBCUTANEOUS ONCE
Refills: 0 | Status: COMPLETED | OUTPATIENT
Start: 2019-10-14 | End: 2019-10-14

## 2019-10-14 RX ORDER — AZITHROMYCIN 500 MG/1
500 TABLET, FILM COATED ORAL ONCE
Refills: 0 | Status: COMPLETED | OUTPATIENT
Start: 2019-10-14 | End: 2019-10-14

## 2019-10-14 RX ADMIN — CEFTRIAXONE 100 MILLIGRAM(S): 500 INJECTION, POWDER, FOR SOLUTION INTRAMUSCULAR; INTRAVENOUS at 22:24

## 2019-10-14 RX ADMIN — SODIUM CHLORIDE 1000 MILLILITER(S): 9 INJECTION INTRAMUSCULAR; INTRAVENOUS; SUBCUTANEOUS at 22:23

## 2019-10-14 RX ADMIN — AZITHROMYCIN 255 MILLIGRAM(S): 500 TABLET, FILM COATED ORAL at 22:46

## 2019-10-14 RX ADMIN — SODIUM CHLORIDE 1000 MILLILITER(S): 9 INJECTION INTRAMUSCULAR; INTRAVENOUS; SUBCUTANEOUS at 20:43

## 2019-10-14 RX ADMIN — SODIUM CHLORIDE 1000 MILLILITER(S): 9 INJECTION INTRAMUSCULAR; INTRAVENOUS; SUBCUTANEOUS at 21:40

## 2019-10-14 RX ADMIN — CEFTRIAXONE 1000 MILLIGRAM(S): 500 INJECTION, POWDER, FOR SOLUTION INTRAMUSCULAR; INTRAVENOUS at 22:30

## 2019-10-14 NOTE — H&P ADULT - PROBLEM SELECTOR PLAN 7
chronic, stable  - holding home furosemide 40mg every other day, 20mg every other day  - holding KCl 8meq every other day chronic, stable  - continue metoprolol ER 50 mg BID with hold parameters  - on warfarin (recently decreased dose in setting of infection and kidney failure)   - f/u AM INR in the setting of recent antibiotic usage vs Legionella  - WBC 20, monitor stool burden, not suspicious of C diff at this time stool was loose, nonbloody, non-watery

## 2019-10-14 NOTE — H&P ADULT - PROBLEM SELECTOR PLAN 6
chronic, stable  - continue metoprolol ER 50 mg BID with hold parameters  - on warfarin (recently decreased dose in setting of infection and kidney failure)   - f/u AM INR in the setting of recent antibiotic usage vs Legionella  - WBC 20, monitor stool burden, not suspicious of C diff at this time stool was loose, nonbloody, non-watery acute hyponatremia (127) likely 2/2 acute renal failure/hypovolemia  - Na 127, with altered mental status  - hold sertraline for now  - check urine electrolytes, urine and serum osmolality  - gentle IV fluids - no signs of overload at this time  - f/u AM BMP  - Nephology consult Dr. Escobedo

## 2019-10-14 NOTE — H&P ADULT - HISTORY OF PRESENT ILLNESS
89 y/o M with PMHx of systolic/diastolic CHF (EF 38%-60% in 4/2018), atrial fibrillation (on warfarin), HTN, HLD, BPH, bladder neoplasm, prostate cancer s/p radiation, depression, gout presented to ED due to cough and no urine output for >24 hours. Patient is with chronic vega, changed one week ago. Was started on macrobid as outpatient (diagnosed with UTI over the phone per wife), had failed antibiotic therapy prior to this. In the ED, urine in Vega bag was noted to be grossly pyuric. Multiple attempts made by nursing and ER physician to replace vega - US bladder without visualization of catheter and patient anxious/in discomfort. History obtained by wife due to patient's dementia. States that patient has not seen urology Dr. Brownlee for a while now.     In the ED, VS significant for Tmax 100.3 (rectal), . Labs significant for: WBC 20.63, Na 127, BUN/Cr 115/4.6. CXR showed small bilateral effusions, ?left infiltrate vs atelectasis. US bladder did not visualize vega catheter - showed mildly distended urinary bladder. EKG showed atrial fibrillation. 89 y/o M with PMHx of systolic/diastolic CHF (EF 38%-60% in 4/2018), atrial fibrillation (on warfarin), HTN, HLD, BPH, bladder neoplasm, prostate cancer s/p radiation, depression, gout presented to ED due to cough and no urine output for >24 hours. Patient is with chronic vega, changed one week ago. Was started on macrobid as outpatient (diagnosed with UTI over the phone per wife), had failed antibiotic therapy prior to this. Patient became more weak and "delirious" 2 days prior to admission with several episodes of diarrhea.  In the ED, urine in Vega bag was noted to be grossly pyuric. Multiple attempts made by nursing and ER physician to replace vega - US bladder without visualization of catheter and patient anxious/in discomfort. History obtained by wife due to patient's dementia. States that patient has not seen urology Dr. Brownlee for a while now.  Per wife, patient has been having cloudy urine on and off for 3 months without any treatment.      In the ED, VS significant for Tmax 100.3 (rectal), . Labs significant for: WBC 20.63, Na 127, BUN/Cr 115/4.6. CXR showed small bilateral effusions, ?left infiltrate vs atelectasis. US bladder did not visualize vega catheter - showed mildly distended urinary bladder. EKG showed atrial fibrillation. 89 y/o M with PMHx of systolic/diastolic CHF (EF 38%-60% in 4/2018), atrial fibrillation (on warfarin), HTN, HLD, BPH, bladder neoplasm, prostate cancer s/p radiation, depression, gout presented to ED due to cough and no urine output for >24 hours. Patient is with chronic vega, changed one week ago. Was started on macrobid as outpatient (diagnosed with UTI over the phone per wife), had failed antibiotic therapy prior to this. Patient became more weak and "delirious" 2 days prior to admission with several episodes of diarrhea.  In the ED, urine in Vega bag was noted to be grossly pyuric. Multiple attempts made by nursing and ER physician to replace vega - US bladder without visualization of catheter and patient anxious/in discomfort. History obtained by wife due to patient's dementia. States that patient has not seen urology Dr. Brownlee for a while now.  Per wife, patient has been having cloudy urine on and off for 3 months without any treatment.      In the ED, VS significant for Tmax 100.3 (rectal), . Labs significant for: WBC 20.63, Na 127, BUN/Cr 115/4.6. CXR showed small bilateral effusions, ?left infiltrate vs atelectasis. US bladder did not visualize vega catheter - showed mildly distended urinary bladder. EKG showed atrial fibrillation 97 with LAFB.

## 2019-10-14 NOTE — H&P ADULT - NSHPATTENDINGPLANDISCUSS_GEN_ALL_CORE
patient's wife, admitting resident; urology Dr. Brownlee re: plan of care for imaging, antibiotics and expected course

## 2019-10-14 NOTE — H&P ADULT - NSICDXPASTSURGICALHX_GEN_ALL_CORE_FT
PAST SURGICAL HISTORY:  Circumcision age 5    History of Arthroscopy of Knee left    S/P Cataract Extraction LEFT    S/P Tonsillectomy PAST SURGICAL HISTORY:  Circumcision age 5    History of Arthroscopy of Knee left    History of repair of hip fracture     S/P Cataract Extraction LEFT    S/P Tonsillectomy

## 2019-10-14 NOTE — H&P ADULT - NSHPPHYSICALEXAM_GEN_ALL_CORE
Vital Signs Last 24 Hrs  T(C): 37.9 (14 Oct 2019 20:38), Max: 37.9 (14 Oct 2019 20:38)  T(F): 100.3 (14 Oct 2019 20:38), Max: 100.3 (14 Oct 2019 20:38)  HR: 103 (14 Oct 2019 20:38) (98 - 103)  BP: 157/71 (14 Oct 2019 20:38) (138/88 - 157/71)  RR: 19 (14 Oct 2019 20:38) (19 - 20)  SpO2: 100% (14 Oct 2019 20:38) (100% - 100%) Vital Signs Last 24 Hrs  T(C): 37.9 (14 Oct 2019 20:38), Max: 37.9 (14 Oct 2019 20:38)  T(F): 100.3 (14 Oct 2019 20:38), Max: 100.3 (14 Oct 2019 20:38)  HR: 103 (14 Oct 2019 20:38) (98 - 103)  BP: 157/71 (14 Oct 2019 20:38) (138/88 - 157/71)  RR: 19 (14 Oct 2019 20:38) (19 - 20)  SpO2: 100% (14 Oct 2019 20:38) (100% - 100%)    Physical Exam:  General: In some distress, unable to get comfortable in hospital bed, elderly appearing, frail  HEENT: NC/AT, b/l pupils reactive to light, L pupil size= 1-2mm, R pupil size 2-3mm.  moist mucous dry  Neck: Supple, nontender, no masses  CV: irregular, +S1/S2, no murmurs, rubs or gallops  Respiratory: CTA B/L, No W/R/R  Abdominal: with suprapubic distention, +BS, no tenderness to palpation.  Loose foul smelling stool noted in diaper.  Extremities: No C/C/E, + peripheral pulses  Neurology: AAOx1, not participating in physical exam, not following commands

## 2019-10-14 NOTE — H&P ADULT - PROBLEM SELECTOR PLAN 1
chest xray showing metabolic encephalopathy likely 2/2 infectious pneumonia vs UTI vs hyponatremia  - WBC 20  - continue IV antibiotic treatment with ceftriaxone and azithromycin, s/p 2L IV fluids  - f/u CT abd pelvis  - f/u strep antigen, legionella urine antigen, sputum cultures, blood cultures, urine cultures chronic vega with pyuria   - WBC 20  - no urine output for 24hr prior to admission noted to have pyuria in vega bag  - Urology consult: Dr. Brownlee in the ED placed vega drained >400cc pus, slightly pink  - f/u CT Abd/pelvis   - continue IV antibiotic coverage ceftriaxone f/u urine culture for sensitives chronic vega with pyuria   - WBC 20  - no urine output for 24hr prior to admission noted to have pyuria in vega bag  - Urology consult: Dr. Brownlee in the ED placed vega drained >400cc pus, slightly pink  - f/u CT Abd/pelvis to r/o ascending infection  - continue IV empiric antibiotic coverage with ceftriaxone f/u urine culture

## 2019-10-14 NOTE — H&P ADULT - PROBLEM SELECTOR PLAN 5
in the setting of recent antibiotic usage vs C diff vs Legionella  - WBC 20, monitor stool burden, will r/o C. diff   - contact precautions acute hyponatremia (127) likely 2/2 acute renal failure on furosemide vs diarrhea  - Na 127, with altered mental status  - holding furosemide, sertraline and other nephro toxic medications  - f/u AM BMP  - Nephology consult Dr. Escobedo acute hyponatremia (127) likely 2/2 acute renal failure/hypovolemia  - Na 127, with altered mental status  - hold sertraline for now  - check urine electrolytes, urine and serum osmolality  - gentle IV fluids - no signs of overload at this time  - f/u AM BMP  - Nephology consult Dr. Escobedo pro BNP 48,000  - holding home furosemide 40mg every other day, 20mg every other day and KCl 8meq every other day in setting of acute renal failure

## 2019-10-14 NOTE — H&P ADULT - PROBLEM SELECTOR PLAN 10
DVT ppx:  IMPROVE VTE Score: 4 patient on warfarin will not need prophylaxis at this time.    Palliative care consult DVT ppx:  IMPROVE VTE Score: 4 patient on warfarin will not need prophylaxis at this time, dose coumadin per INR    Palliative care consult - wife states patient had hospice referral this week but now would like full code at this time, although states that pt would not want life support - will evaluate for DNR/DNI

## 2019-10-14 NOTE — H&P ADULT - PROBLEM SELECTOR PLAN 9
DVT ppx:  IMPROVE VTE Score: 4 patient on warfarin will not need prophylaxis at this time. chronic, stable  - holding home sertraline in setting of hyponatremia

## 2019-10-14 NOTE — H&P ADULT - PROBLEM SELECTOR PLAN 4
acute hyponatremia (127) likely 2/2 acute renal failure on furosemide vs diarrhea  - Na 127, with altered mental status  - holding furosemide, sertraline and other nephro toxic medications  - f/u AM BMP  - Nephology consult metabolic encephalopathy likely 2/2 infectious pneumonia vs UTI vs hyponatremia  - continue IV antibiotic treatment with ceftriaxone and azithromycin, s/p 2L IV fluids  - f/u CT abd pelvis  - f/u strep antigen, legionella urine antigen, sputum cultures, blood cultures, urine cultures has baseline dementia - wife reports patient becoming more weak and requiring soft diet over last week, while normally tolerating regular diet  metabolic encephalopathy likely 2/2 infectious pneumonia vs UTI vs hyponatremia  - continue IV antibiotic treatment with ceftriaxone and azithromycin, s/p 2L IV fluids  - f/u CT abd pelvis  - f/u strep antigen, legionella urine antigen, sputum cultures, blood cultures, urine cultures

## 2019-10-14 NOTE — ED ADULT NURSE NOTE - OBJECTIVE STATEMENT
patient came in ED from home, BIBA with c/o cough, weakness. alert and oriented x 1 (person). spouse states patient came in ED from home, BIBA with c/o cough, weakness. alert and oriented x 1 (person). spouse states he was diagnosed the patient with UTI and put him on Macrobid. Rosas catheter was changed a week ago. on arrival Rosas cathter bag was noted with very cloudy urine output more like pus. non-labored respiration noted. rectal temp 100.3F. Dr Cao s/e patient at the bedside. will monitor.

## 2019-10-14 NOTE — H&P ADULT - NSICDXPASTMEDICALHX_GEN_ALL_CORE_FT
PAST MEDICAL HISTORY:  Acute congestive heart failure 12/2010    Atrial Fibrillation 1990    Atrial fibrillation     Bladder neoplasm     Chronic kidney disease (CKD), stage IV (severe)     Congestive heart failure hospitalized  once 12/2010    Depression     Gout     [place] (hard of hearing)     Hyperlipidemia     Hyperlipidemia     Hypertension     Hypertension     Prostate cancer 9/2011 - s/p external radiation treatments - last 12/9/11, s/p Bicalvutamide    Prostate cancer PAST MEDICAL HISTORY:  Acute congestive heart failure 12/2010    Atrial Fibrillation 1990    Atrial fibrillation     Bladder neoplasm     Chronic kidney disease (CKD), stage IV (severe)     Congestive heart failure hospitalized  once 12/2010    Depression     Gout     [place] (hard of hearing)     Hyperlipidemia     Hyperlipidemia     Hypertension     Hypertension     Prostate cancer 9/2011 - s/p external radiation treatments - last 12/9/11, s/p Bicalvutamide    Prostate cancer chronic vega

## 2019-10-14 NOTE — H&P ADULT - PROBLEM SELECTOR PLAN 2
ARF likely 2/2 outlet obstruction vs infectious vs dehydration  - no urine output for 24hr prior to admission noted to have pyuria in vega bag  - s/p 2L NS, ceftriaxone  - multiple attempts at vega placement were unsuccessful.  Urology consult: Dr. Brownlee in the ED.  - CT Abd/pelvis to be ordered  - continue IV antibiotic coverage ceftriaxone f/u urine culture for sensitives ARF likely 2/2 outlet obstruction vs infectious vs dehydration  - BUN/Cr 115/4.6 received 2L in ED  - no urine output for 24hr prior to admission noted to have pyuria in vega bag  - multiple attempts at vega placement were unsuccessful.  Urology consult: Dr. Brownlee in the ED placed vega drained >400cc pus, slightly pink  - f/u CT Abd/pelvis   - continue IV antibiotic coverage ceftriaxone f/u urine culture for sensitives ARF likely 2/2 outlet obstruction vs infectious vs dehydration  - BUN/Cr 115/4.6 received 2L in ED  - f/u CT Abd/pelvis   - continue IV antibiotic coverage ceftriaxone f/u urine culture for sensitives KAILEE on CKD 4 (wife reports baseline Cr of ~2.6)  likely 2/2 outlet obstruction vs infectious vs dehydration  - BUN/Cr 115/4.6 - received 2L in ED  - f/u CT Abd/pelvis   - continue IV antibiotic coverage ceftriaxone f/u urine culture for sensitives  - gentle IV fluids for now, monitor volume status closely  - monitor renal indices, avoid nephrotoxic agents, strict intake and output  - nephrology consult Dr. Sánchez

## 2019-10-14 NOTE — H&P ADULT - PROBLEM SELECTOR PLAN 8
chronic, stable  - holding home sertraline in setting on hyponatremia chronic, stable  - holding home furosemide 40mg every other day, 20mg every other day  - holding KCl 8meq every other day chronic, stable  - holding home furosemide 40mg every other day, 20mg every other day  - holding KCl 8meq every other day  - daily weights, strict i's and o's, closely monitor volume status chronic, stable  - continue metoprolol ER 50 mg BID with hold parameters  - on warfarin (recently decreased dose in setting of infection and kidney failure)   - f/u AM INR

## 2019-10-14 NOTE — H&P ADULT - ASSESSMENT
89 y/o M with PMHx of systolic/diastolic CHF (EF 38%-60% in 4/2018), atrial fibrillation (on warfarin), HTN, HLD, BPH, bladder neoplasm, prostate cancer s/p radiation, depression, gout admitted with urinary retention, suspected UTI, hyponatremia, KAILEE on CKD IV and suspected pneumonia.

## 2019-10-14 NOTE — ED ADULT TRIAGE NOTE - CHIEF COMPLAINT QUOTE
BIBA from home cough & no urine output x 24hours BIBA from home cough & no urine output x 24hours... weakness x 1 week

## 2019-10-14 NOTE — H&P ADULT - PROBLEM SELECTOR PROBLEM 1
Pneumonia Altered mental state Urinary tract infection associated with indwelling urethral catheter, initial encounter

## 2019-10-14 NOTE — ED ADULT NURSE NOTE - PMH
Acute congestive heart failure  12/2010  Atrial fibrillation    Atrial Fibrillation  1990  Bladder neoplasm    Congestive heart failure  hospitalized  once 12/2010  Depression    Gout    [place] (hard of hearing)    Hyperlipidemia    Hyperlipidemia    Hypertension    Hypertension    Prostate cancer    Prostate cancer  9/2011 - s/p external radiation treatments - last 12/9/11, now on Bicalvutamide

## 2019-10-14 NOTE — ED ADULT NURSE REASSESSMENT NOTE - NS ED NURSE REASSESS COMMENT FT1
multiple attempts have been made to reinsert the Rosas catheter by ER and MD's. last attempt was made by Dr Kristopher riggs.

## 2019-10-14 NOTE — ED PROVIDER NOTE - PMH
Acute congestive heart failure  12/2010  Atrial fibrillation    Atrial Fibrillation  1990  Bladder neoplasm    Congestive heart failure  hospitalized  once 12/2010  Depression    Gout    [place] (hard of hearing)    Hyperlipidemia    Hyperlipidemia    Hypertension    Hypertension    Prostate cancer    Prostate cancer  9/2011 - s/p external radiation treatments - last 12/9/11, now on Bicalvutamide

## 2019-10-14 NOTE — H&P ADULT - PROBLEM SELECTOR PLAN 3
WBC 20.63 on admission 2/2 CAP pneumonia vs UTI  - CAP vs atelectasis on xray  - f/u strep antigen, sputum cultures, blood cultures  - r/o legionella in setting of hyponatremia and diarrhea  - continue azithromycin and ceftriaxone CAP vs atelectasis on CXR  - f/u legionella/strep antigen, sputum cultures, blood cultures  - r/o legionella in setting of hyponatremia and diarrhea  - continue azithromycin (pending legionella ag) and ceftriaxone

## 2019-10-14 NOTE — H&P ADULT - PROBLEM SELECTOR PROBLEM 8
Prostate cancer Depression Chronic combined systolic and diastolic congestive heart failure Atrial fibrillation

## 2019-10-14 NOTE — ED PROVIDER NOTE - CARE PLAN
Principal Discharge DX:	UTI (urinary tract infection)  Secondary Diagnosis:	Acute renal failure Principal Discharge DX:	Pneumonia  Secondary Diagnosis:	Acute renal failure  Secondary Diagnosis:	Leukocytosis

## 2019-10-14 NOTE — ED PROVIDER NOTE - OBJECTIVE STATEMENT
89yo male bib ems with cough, weakness for a week with decreased urine outpt, no vomiting or diarrhea, hx is limited because pt has dementia

## 2019-10-15 DIAGNOSIS — N17.9 ACUTE KIDNEY FAILURE, UNSPECIFIED: ICD-10-CM

## 2019-10-15 DIAGNOSIS — J18.9 PNEUMONIA, UNSPECIFIED ORGANISM: ICD-10-CM

## 2019-10-15 DIAGNOSIS — I50.42 CHRONIC COMBINED SYSTOLIC (CONGESTIVE) AND DIASTOLIC (CONGESTIVE) HEART FAILURE: ICD-10-CM

## 2019-10-15 DIAGNOSIS — I48.91 UNSPECIFIED ATRIAL FIBRILLATION: ICD-10-CM

## 2019-10-15 DIAGNOSIS — Z98.890 OTHER SPECIFIED POSTPROCEDURAL STATES: Chronic | ICD-10-CM

## 2019-10-15 DIAGNOSIS — Z29.9 ENCOUNTER FOR PROPHYLACTIC MEASURES, UNSPECIFIED: ICD-10-CM

## 2019-10-15 DIAGNOSIS — E87.1 HYPO-OSMOLALITY AND HYPONATREMIA: ICD-10-CM

## 2019-10-15 DIAGNOSIS — C61 MALIGNANT NEOPLASM OF PROSTATE: ICD-10-CM

## 2019-10-15 DIAGNOSIS — D72.829 ELEVATED WHITE BLOOD CELL COUNT, UNSPECIFIED: ICD-10-CM

## 2019-10-15 DIAGNOSIS — F32.9 MAJOR DEPRESSIVE DISORDER, SINGLE EPISODE, UNSPECIFIED: ICD-10-CM

## 2019-10-15 DIAGNOSIS — I50.43 ACUTE ON CHRONIC COMBINED SYSTOLIC (CONGESTIVE) AND DIASTOLIC (CONGESTIVE) HEART FAILURE: ICD-10-CM

## 2019-10-15 DIAGNOSIS — R19.7 DIARRHEA, UNSPECIFIED: ICD-10-CM

## 2019-10-15 DIAGNOSIS — R33.9 RETENTION OF URINE, UNSPECIFIED: ICD-10-CM

## 2019-10-15 DIAGNOSIS — N39.0 URINARY TRACT INFECTION, SITE NOT SPECIFIED: ICD-10-CM

## 2019-10-15 DIAGNOSIS — R41.82 ALTERED MENTAL STATUS, UNSPECIFIED: ICD-10-CM

## 2019-10-15 DIAGNOSIS — T83.511A INFECTION AND INFLAMMATORY REACTION DUE TO INDWELLING URETHRAL CATHETER, INITIAL ENCOUNTER: ICD-10-CM

## 2019-10-15 LAB
ALBUMIN SERPL ELPH-MCNC: 2 G/DL — LOW (ref 3.3–5)
ALP SERPL-CCNC: 141 U/L — HIGH (ref 40–120)
ALT FLD-CCNC: 35 U/L — SIGNIFICANT CHANGE UP (ref 12–78)
ANION GAP SERPL CALC-SCNC: 11 MMOL/L — SIGNIFICANT CHANGE UP (ref 5–17)
ANION GAP SERPL CALC-SCNC: 13 MMOL/L — SIGNIFICANT CHANGE UP (ref 5–17)
APPEARANCE UR: ABNORMAL
APTT BLD: 47.6 SEC — HIGH (ref 28.5–37)
AST SERPL-CCNC: 63 U/L — HIGH (ref 15–37)
BACTERIA # UR AUTO: ABNORMAL
BASOPHILS # BLD AUTO: 0.03 K/UL — SIGNIFICANT CHANGE UP (ref 0–0.2)
BASOPHILS NFR BLD AUTO: 0.1 % — SIGNIFICANT CHANGE UP (ref 0–2)
BILIRUB SERPL-MCNC: 0.3 MG/DL — SIGNIFICANT CHANGE UP (ref 0.2–1.2)
BILIRUB UR-MCNC: NEGATIVE — SIGNIFICANT CHANGE UP
BUN SERPL-MCNC: 105 MG/DL — HIGH (ref 7–23)
BUN SERPL-MCNC: 94 MG/DL — HIGH (ref 7–23)
CALCIUM SERPL-MCNC: 8.2 MG/DL — LOW (ref 8.5–10.1)
CALCIUM SERPL-MCNC: 8.5 MG/DL — SIGNIFICANT CHANGE UP (ref 8.5–10.1)
CHLORIDE SERPL-SCNC: 102 MMOL/L — SIGNIFICANT CHANGE UP (ref 96–108)
CHLORIDE SERPL-SCNC: 111 MMOL/L — HIGH (ref 96–108)
CO2 SERPL-SCNC: 15 MMOL/L — LOW (ref 22–31)
CO2 SERPL-SCNC: 16 MMOL/L — LOW (ref 22–31)
COLOR SPEC: SIGNIFICANT CHANGE UP
COMMENT - URINE: SIGNIFICANT CHANGE UP
CREAT SERPL-MCNC: 3.4 MG/DL — HIGH (ref 0.5–1.3)
CREAT SERPL-MCNC: 4.1 MG/DL — HIGH (ref 0.5–1.3)
DIFF PNL FLD: ABNORMAL
EOSINOPHIL # BLD AUTO: 0 K/UL — SIGNIFICANT CHANGE UP (ref 0–0.5)
EOSINOPHIL NFR BLD AUTO: 0 % — SIGNIFICANT CHANGE UP (ref 0–6)
EPI CELLS # UR: SIGNIFICANT CHANGE UP
GLUCOSE SERPL-MCNC: 114 MG/DL — HIGH (ref 70–99)
GLUCOSE SERPL-MCNC: 77 MG/DL — SIGNIFICANT CHANGE UP (ref 70–99)
GLUCOSE UR QL: NEGATIVE — SIGNIFICANT CHANGE UP
HCT VFR BLD CALC: 33.3 % — LOW (ref 39–50)
HGB BLD-MCNC: 11.2 G/DL — LOW (ref 13–17)
IMM GRANULOCYTES NFR BLD AUTO: 1.1 % — SIGNIFICANT CHANGE UP (ref 0–1.5)
INR BLD: 4.9 RATIO — HIGH (ref 0.88–1.16)
INR BLD: 5.34 RATIO — CRITICAL HIGH (ref 0.88–1.16)
KETONES UR-MCNC: NEGATIVE — SIGNIFICANT CHANGE UP
LEGIONELLA AG UR QL: NEGATIVE — SIGNIFICANT CHANGE UP
LEUKOCYTE ESTERASE UR-ACNC: ABNORMAL
LYMPHOCYTES # BLD AUTO: 0.17 K/UL — LOW (ref 1–3.3)
LYMPHOCYTES # BLD AUTO: 0.8 % — LOW (ref 13–44)
MAGNESIUM SERPL-MCNC: 2.1 MG/DL — SIGNIFICANT CHANGE UP (ref 1.6–2.6)
MAGNESIUM SERPL-MCNC: 2.2 MG/DL — SIGNIFICANT CHANGE UP (ref 1.6–2.6)
MCHC RBC-ENTMCNC: 31.5 PG — SIGNIFICANT CHANGE UP (ref 27–34)
MCHC RBC-ENTMCNC: 33.6 GM/DL — SIGNIFICANT CHANGE UP (ref 32–36)
MCV RBC AUTO: 93.5 FL — SIGNIFICANT CHANGE UP (ref 80–100)
MONOCYTES # BLD AUTO: 0.66 K/UL — SIGNIFICANT CHANGE UP (ref 0–0.9)
MONOCYTES NFR BLD AUTO: 3 % — SIGNIFICANT CHANGE UP (ref 2–14)
NEUTROPHILS # BLD AUTO: 20.62 K/UL — HIGH (ref 1.8–7.4)
NEUTROPHILS NFR BLD AUTO: 95 % — HIGH (ref 43–77)
NITRITE UR-MCNC: NEGATIVE — SIGNIFICANT CHANGE UP
NRBC # BLD: 0 /100 WBCS — SIGNIFICANT CHANGE UP (ref 0–0)
NT-PROBNP SERPL-SCNC: HIGH PG/ML (ref 0–450)
OSMOLALITY SERPL: 301 MOSMOL/KG — SIGNIFICANT CHANGE UP (ref 280–301)
OSMOLALITY UR: 308 MOSM/KG — SIGNIFICANT CHANGE UP (ref 50–1200)
PH UR: 5 — SIGNIFICANT CHANGE UP (ref 5–8)
PHOSPHATE SERPL-MCNC: 5.4 MG/DL — HIGH (ref 2.5–4.5)
PHOSPHATE SERPL-MCNC: 5.5 MG/DL — HIGH (ref 2.5–4.5)
PLATELET # BLD AUTO: 308 K/UL — SIGNIFICANT CHANGE UP (ref 150–400)
POTASSIUM SERPL-MCNC: 4.4 MMOL/L — SIGNIFICANT CHANGE UP (ref 3.5–5.3)
POTASSIUM SERPL-MCNC: 5 MMOL/L — SIGNIFICANT CHANGE UP (ref 3.5–5.3)
POTASSIUM SERPL-SCNC: 4.4 MMOL/L — SIGNIFICANT CHANGE UP (ref 3.5–5.3)
POTASSIUM SERPL-SCNC: 5 MMOL/L — SIGNIFICANT CHANGE UP (ref 3.5–5.3)
PROT SERPL-MCNC: 6.3 G/DL — SIGNIFICANT CHANGE UP (ref 6–8.3)
PROT UR-MCNC: 150 MG/DL
PROTHROM AB SERPL-ACNC: 58.7 SEC — HIGH (ref 10–12.9)
PROTHROM AB SERPL-ACNC: 63.5 SEC — HIGH (ref 10–12.9)
RBC # BLD: 3.56 M/UL — LOW (ref 4.2–5.8)
RBC # FLD: 14.2 % — SIGNIFICANT CHANGE UP (ref 10.3–14.5)
RBC CASTS # UR COMP ASSIST: ABNORMAL /HPF (ref 0–4)
SODIUM SERPL-SCNC: 131 MMOL/L — LOW (ref 135–145)
SODIUM SERPL-SCNC: 137 MMOL/L — SIGNIFICANT CHANGE UP (ref 135–145)
SP GR SPEC: 1.01 — SIGNIFICANT CHANGE UP (ref 1.01–1.02)
UROBILINOGEN FLD QL: NEGATIVE — SIGNIFICANT CHANGE UP
WBC # BLD: 21.72 K/UL — HIGH (ref 3.8–10.5)
WBC # FLD AUTO: 21.72 K/UL — HIGH (ref 3.8–10.5)
WBC UR QL: ABNORMAL

## 2019-10-15 PROCEDURE — 93010 ELECTROCARDIOGRAM REPORT: CPT

## 2019-10-15 PROCEDURE — 74176 CT ABD & PELVIS W/O CONTRAST: CPT | Mod: 26

## 2019-10-15 PROCEDURE — 99233 SBSQ HOSP IP/OBS HIGH 50: CPT | Mod: GC

## 2019-10-15 RX ORDER — AZITHROMYCIN 500 MG/1
500 TABLET, FILM COATED ORAL EVERY 24 HOURS
Refills: 0 | Status: DISCONTINUED | OUTPATIENT
Start: 2019-10-15 | End: 2019-10-18

## 2019-10-15 RX ORDER — CHOLECALCIFEROL (VITAMIN D3) 125 MCG
1 CAPSULE ORAL
Qty: 0 | Refills: 0 | DISCHARGE

## 2019-10-15 RX ORDER — INFLUENZA VIRUS VACCINE 15; 15; 15; 15 UG/.5ML; UG/.5ML; UG/.5ML; UG/.5ML
0.5 SUSPENSION INTRAMUSCULAR ONCE
Refills: 0 | Status: DISCONTINUED | OUTPATIENT
Start: 2019-10-15 | End: 2019-10-19

## 2019-10-15 RX ORDER — WARFARIN SODIUM 2.5 MG/1
2 TABLET ORAL
Qty: 0 | Refills: 0 | DISCHARGE

## 2019-10-15 RX ORDER — CHOLECALCIFEROL (VITAMIN D3) 125 MCG
400 CAPSULE ORAL DAILY
Refills: 0 | Status: DISCONTINUED | OUTPATIENT
Start: 2019-10-15 | End: 2019-10-19

## 2019-10-15 RX ORDER — SODIUM CHLORIDE 9 MG/ML
1000 INJECTION INTRAMUSCULAR; INTRAVENOUS; SUBCUTANEOUS
Refills: 0 | Status: DISCONTINUED | OUTPATIENT
Start: 2019-10-15 | End: 2019-10-15

## 2019-10-15 RX ORDER — METOPROLOL TARTRATE 50 MG
1 TABLET ORAL
Qty: 0 | Refills: 0 | DISCHARGE

## 2019-10-15 RX ORDER — CEFTRIAXONE 500 MG/1
1000 INJECTION, POWDER, FOR SOLUTION INTRAMUSCULAR; INTRAVENOUS EVERY 24 HOURS
Refills: 0 | Status: DISCONTINUED | OUTPATIENT
Start: 2019-10-15 | End: 2019-10-18

## 2019-10-15 RX ORDER — WARFARIN SODIUM 2.5 MG/1
1 TABLET ORAL
Qty: 0 | Refills: 0 | DISCHARGE

## 2019-10-15 RX ORDER — METOPROLOL TARTRATE 50 MG
50 TABLET ORAL
Refills: 0 | Status: DISCONTINUED | OUTPATIENT
Start: 2019-10-15 | End: 2019-10-18

## 2019-10-15 RX ADMIN — SODIUM CHLORIDE 50 MILLILITER(S): 9 INJECTION INTRAMUSCULAR; INTRAVENOUS; SUBCUTANEOUS at 12:33

## 2019-10-15 RX ADMIN — CEFTRIAXONE 100 MILLIGRAM(S): 500 INJECTION, POWDER, FOR SOLUTION INTRAMUSCULAR; INTRAVENOUS at 21:05

## 2019-10-15 RX ADMIN — SODIUM CHLORIDE 50 MILLILITER(S): 9 INJECTION INTRAMUSCULAR; INTRAVENOUS; SUBCUTANEOUS at 04:03

## 2019-10-15 RX ADMIN — Medication 50 MILLIGRAM(S): at 04:02

## 2019-10-15 RX ADMIN — AZITHROMYCIN 255 MILLIGRAM(S): 500 TABLET, FILM COATED ORAL at 22:12

## 2019-10-15 RX ADMIN — Medication 50 MILLIGRAM(S): at 18:23

## 2019-10-15 NOTE — CONSULT NOTE ADULT - ASSESSMENT
89 white male with advanced dementia on hospice at home now admitted with possible sepsis with cystitis. Now on IV antibiotics.  Rosas replaced and was given IVF. Will hold the lasix for now and continue the antibiotics.  spoke to patient's dtr at bed side. She does not want any renal intervention. He is not a candidate for any RRT due to advanced age and dementia.

## 2019-10-15 NOTE — CONSULT NOTE ADULT - SUBJECTIVE AND OBJECTIVE BOX
CHIEF COMPLAINT: weakness    HISTORY OF PRESENT ILLNESS:89 y/o man with chronic retention has 2 weeks of intermittently cloudy urine. He was brought to ER with low grade fever and weakness. ER staff could not replace vega. Pt has hx of Ca Prostate, and is letty s/p XRT.        PAST MEDICAL & SURGICAL HISTORY:  Chronic kidney disease (CKD), stage IV (severe)  Prostate cancer: chronic vega  Hyperlipidemia  Hypertension  Atrial fibrillation  [place] (hard of hearing)  Bladder neoplasm  Congestive heart failure: hospitalized  once 12/2010  Acute congestive heart failure: 12/2010  Prostate cancer: 9/2011 - s/p external radiation treatments - last 12/9/11, s/p Bicalvutamide  Gout  Depression  Hypertension  Hyperlipidemia  Atrial Fibrillation: 1990  History of repair of hip fracture  Circumcision: age 5  History of Arthroscopy of Knee: left  S/P Cataract Extraction: LEFT  S/P Tonsillectomy      REVIEW OF SYSTEMS:    CONSTITUTIONAL: + weakness, + fevers and chills  EYES/ENT: No visual changes;  No vertigo or throat pain   NECK: No pain or stiffness  RESPIRATORY: No cough, wheezing, hemoptysis; No shortness of breath  CARDIOVASCULAR: No chest pain or palpitations  GASTROINTESTINAL: No abdominal or epigastric pain. No nausea, vomiting, or hematemesis; No diarrhea or constipation. No melena or hematochezia.  GENITOURINARY: chronic vega  NEUROLOGICAL: No numbness or weakness  SKIN: No itching, burning, rashes, or lesions   All other review of systems is negative unless indicated above.    MEDICATIONS  (STANDING):    MEDICATIONS  (PRN):      Allergies    Hay Fever (Rhinorrhea)  No Known Drug Allergies    Intolerances        SOCIAL HISTORY:    FAMILY HISTORY:  Family history of cancer      Vital Signs Last 24 Hrs  T(C): 37.9 (14 Oct 2019 20:38), Max: 37.9 (14 Oct 2019 20:38)  T(F): 100.3 (14 Oct 2019 20:38), Max: 100.3 (14 Oct 2019 20:38)  HR: 103 (14 Oct 2019 20:38) (98 - 103)  BP: 157/71 (14 Oct 2019 20:38) (138/88 - 157/71)  BP(mean): --  RR: 19 (14 Oct 2019 20:38) (19 - 20)  SpO2: 100% (14 Oct 2019 20:38) (100% - 100%)    PHYSICAL EXAM:    Constitutional: NAD, appears ill  HEENT: JOSSELYN, EOMI, Normal Hearing, MMM  Neck: No LAD, No JVD  Back: Normal spine flexure, No CVA tenderness  Respiratory: not using accessory muscles   Abd: BS+, soft, NT/ND, No CVAT  : Normal phallus,open meatus,bilateral descended testes, no masses  Extremities: No peripheral edema  Vascular: 2+ peripheral pulses  Neurological: A/O x 3, no focal deficits  Musculoskeletal: 5/5 strength b/l upper and lower extremities  Skin: No rashes    LABS:                        11.7   20.63 )-----------( 328      ( 14 Oct 2019 20:48 )             34.7     10-14    127<L>  |  97  |  115<H>  ----------------------------<  121<H>  5.3   |  19<L>  |  4.60<H>    Ca    8.5      14 Oct 2019 20:48    TPro  6.5  /  Alb  2.1<L>  /  TBili  0.3  /  DBili  x   /  AST  21  /  ALT  17  /  AlkPhos  131<H>  10-14        Urine Culture: pending    RADIOLOGY & ADDITIONAL STUDIES:  EXAM:  US URINARY BLADDER                            PROCEDURE DATE:  10/14/2019          INTERPRETATION:  CLINICAL INFORMATION:  check vega placement    TECHNIQUE:  Bladder sonogram is performed.    COMPARISON:  CT the abdomen and pelvis from 9/12/2017.    FINDINGS:      Bladder mildly distended with thickening and trabeculations of the walls   with multiple bladder diverticula. Both ureteral jets are visualized. A   Vega catheter is not visualized.    IMPRESSION:    Vega catheter not visualized. Bladder mildly distended with thickening   and trabecular of the walls with multiple bladder diverticula.

## 2019-10-15 NOTE — PROGRESS NOTE ADULT - ASSESSMENT
INTERVAL OVERNIGHT REPORT  Currently asleep, vega drainage as above  Discussed with pt's wife, he is confused, lethargic  Repeat labs at 0400 noted, WBC 21.7, no change, creat down a bit to 4.1 from 4.6  CT results noted  Agree with current IV antibiotic therapy  Will follow

## 2019-10-15 NOTE — PROCEDURE NOTE - NSURITECHNIQUE_GU_A_CORE
The collection bag is below the level of the patient and urinary bladder/The site was cleaned with soap/water and sterile solution (betadine)/The catheter was appropriately lubricated/All applicable medical record documentation is completed/Proper hand hygiene was performed/Sterile gloves were worn for the duration of the procedure/A sterile drape was used to cover all adjacent areas/The catheter was secured with a securement device (e.g. StatLock)/The urinary drainage system is closed at the end of the procedure

## 2019-10-15 NOTE — ED ADULT NURSE REASSESSMENT NOTE - NS ED NURSE REASSESS COMMENT FT1
MD Cole notified of patient's climbing heart rate (100-120) and rhythm of A-fib.  as per MD, patient given his 6 am dose of Metoprolol 50 mg PO.

## 2019-10-15 NOTE — PROCEDURE NOTE - NSPROCDETAILS_GEN_ALL_CORE
a urinary catheter insertion kit was used for all insertion materials/sterile technique, indwelling urinary device inserted/prior to placement, an active physician order for the placement of a urinary catheter was verified

## 2019-10-15 NOTE — CONSULT NOTE ADULT - SUBJECTIVE AND OBJECTIVE BOX
Nephrology Consultation: MD JORDIN CarranzaANA  89y    HPI:  89 y/o M with PMHx of systolic/diastolic CHF (EF 38%-60% in 2018), atrial fibrillation (on warfarin), HTN, HLD, BPH, bladder neoplasm, prostate cancer s/p radiation, depression, gout presented to ED due to cough and no urine output for >24 hours. Patient is with chronic vega, changed one week ago. Was started on macrobid as outpatient (diagnosed with UTI over the phone per wife), had failed antibiotic therapy prior to this. Patient became more weak and "delirious" 2 days prior to admission with several episodes of diarrhea.  In the ED, urine in Vega bag was noted to be grossly pyuric. Multiple attempts made by nursing and ER physician to replace vega - US bladder without visualization of catheter and patient anxious/in discomfort.  CXR showed small bilateral effusions, ?left infiltrate vs atelectasis. US bladder did not visualize vega catheter - showed mildly distended urinary bladder. Vega now replaced. Patient has advanced dementia, history obtained from patient's dtr at bed side. No history of NSAIDS usage. Has been on diuretics at home. Has not seen any nephrologist as out patient. patient has been on hospice at home.       PAST MEDICAL & SURGICAL HISTORY:  Chronic kidney disease (CKD), stage IV (severe)  Prostate cancer: chronic vega  Hyperlipidemia  Hypertension  Atrial fibrillation  [place] (hard of hearing)  Bladder neoplasm  Congestive heart failure: hospitalized  once 2010  Acute congestive heart failure: 2010  Prostate cancer: 2011 - s/p external radiation treatments - last 11, s/p Bicalvutamide  Gout  Depression  Hypertension  Hyperlipidemia  Atrial Fibrillation:   History of repair of hip fracture  Circumcision: age 5  History of Arthroscopy of Knee: left  S/P Cataract Extraction: LEFT  S/P Tonsillectomy      Allergies    Hay Fever (Rhinorrhea)  No Known Drug Allergies    Intolerances    Home Medications:  furosemide 20 mg oral tablet: 1 tab(s) orally every other day (15 Oct 2019 03:01)  furosemide 40 mg oral tablet: 1 tab oral everyother day  (15 Oct 2019 03:01)  metoprolol succinate 50 mg oral tablet, extended release: 1 tab(s) orally 2 times a day (15 Oct 2019 03:01)  potassium chloride 8 mEq (600 mg) oral capsule, extended release: 1 tab(s) orally every other day (15 Oct 2019 03:01)  sertraline 25 mg oral tablet: 1 tab(s) orally once a day (15 Oct 2019 03:01)  Vitamin D3 400 intl units oral capsule: 1 cap(s) orally once a day (15 Oct 2019 03:01)  warfarin 2 mg oral tablet: 1 tab(s) orally once a day (15 Oct 2019 03:01)      FAMILY HISTORY:  Family history of cancer      SOCIAL HISTORY:    REVIEW OF SYSTEMS:    Constitutional: No fever, weight loss or fatigue  Eyes: No eye pain, visual disturbances, or discharge  ENT:  No difficulty hearing, tinnitus, vertigo; No sinus or throat pain  Neck: No pain or stiffness  Breasts: No pain, masses or nipple discharge  Respiratory: No cough, wheezing, chills or hemoptysis  Cardiovascular: No chest pain, palpitations, shortness of breath, dizziness or leg swelling  Gastrointestinal: No abdominal or epigastric pain. No nausea, vomiting or hematemesis; No diarrhea or constipation. No melena or hematochezia.  Genitourinary: No dysuria, frequency, hematuria or incontinence  Rectal: No pain, hemorrhoids or incontinence  Neurological: No headaches, memory loss, loss of strength, numbness or tremors  Skin: No itching, burning, rashes or lesions   Lymph Nodes: No enlarged glands  Endocrine: No heat or cold intolerance; No hair loss  Musculoskeletal: No joint pain or swelling; No muscle, back or extremity pain  Psychiatric: No depression, anxiety, mood swings or difficulty sleeping  Heme/Lymph: No easy bruising or bleeding gums  Allergy and Immunologic: No hives or eczema    current medications:    azithromycin  IVPB 500 milliGRAM(s) IV Intermittent every 24 hours  cefTRIAXone   IVPB 1000 milliGRAM(s) IV Intermittent every 24 hours  cholecalciferol 400 Unit(s) Oral daily  influenza   Vaccine 0.5 milliLiter(s) IntraMuscular once  metoprolol succinate ER 50 milliGRAM(s) Oral two times a day  sodium chloride 0.9%. 1000 milliLiter(s) IV Continuous <Continuous>      Vital Signs Last 24 Hrs  T(C): 36.4 (15 Oct 2019 05:40), Max: 37.9 (14 Oct 2019 20:38)  T(F): 97.5 (15 Oct 2019 05:40), Max: 100.3 (14 Oct 2019 20:38)  HR: 103 (15 Oct 2019 07:39) (98 - 110)  BP: 137/83 (15 Oct 2019 05:40) (136/69 - 157/71)  BP(mean): --  RR: 18 (15 Oct 2019 05:40) (18 - 20)  SpO2: 100% (15 Oct 2019 06:31) (96% - 100%)    PHYSICAL EXAM:    Constitutional: NAD, well-groomed, well-developed  HEENT: PERRLA, EOMI, Normal Hearing, MMM  Neck: No LAD, No JVD  Back: Normal spine flexure, No CVA tenderness  Respiratory: CTAB/L   Cardiovascular: S1 and S2, RRR, no M/G/R  Gastrointestinal: BS+, soft, NT/ND  Extremities: No peripheral edema  Vascular: 2+ peripheral pulses  Neurological: lethargic  Skin: No rashes      LABS:                        11.2   21.72 )-----------( 308      ( 15 Oct 2019 04:05 )             33.3     10-15    131<L>  |  102  |  105<H>  ----------------------------<  114<H>  5.0   |  16<L>  |  4.10<H>    Ca    8.2<L>      15 Oct 2019 04:05  Phos  5.4     10-15  Mg     2.1     10-15    TPro  6.3  /  Alb  2.0<L>  /  TBili  0.3  /  DBili  x   /  AST  63<H>  /  ALT  35  /  AlkPhos  141<H>  10-15    PT/INR - ( 15 Oct 2019 07:48 )   PT: 63.5 sec;   INR: 5.34 ratio         PTT - ( 15 Oct 2019 07:48 )  PTT:47.6 sec  Urinalysis Basic - ( 15 Oct 2019 01:19 )    Color: Pale Yellow / Appearance: Turbid / S.015 / pH: x  Gluc: x / Ketone: Negative  / Bili: Negative / Urobili: Negative   Blood: x / Protein: 150 mg/dL / Nitrite: Negative   Leuk Esterase: Moderate / RBC: 6-10 /HPF / WBC 11-25   Sq Epi: x / Non Sq Epi: Occasional / Bacteria: Many      Creatinine Trend: 4.10<--, 4.60<--    MICROBIOLOGY:  RECENT CULTURES:        RADIOLOGY & ADDITIONAL STUDIES:    EXAM:  CT ABDOMEN AND PELVIS                            PROCEDURE DATE:  10/15/2019          INTERPRETATION:  [facility] RADIOLOGIST PRELIMINARY REPORT - [facility] Radiologist   Dr. Alton Esparza    PROCEDURE INFORMATION:   Exam: CT Abdomen And Pelvis Without Contrast   Exam date and time: 10/15/2019 5:14 AM   Clinical history: 89 years old, male; Condition or disease; Other:   Pyuria, retention, ckd     TECHNIQUE:   Imaging protocol: Computed tomography of the abdomen and pelvis without   contrast.     COMPARISON:   CT ABDOMEN AND PELVIS 2017 7:47 AM     FINDINGS:   Pleural space: Small bilateral pleural effusions.   Heart: Cardiomegaly.     Liver: Normal. No mass.   Gallbladder and bile ducts: Multiple small calcified gallstones.   Pancreas: Pancreatic head 3.2 cm cystic lesion is unchanged compared to   2017.   Spleen: Normal. No splenomegaly.   Adrenals: Normal. No mass.   Kidneys and ureters: Many bilateral renal cysts most of which are simple   appearing. Minimal enlargement of an indeterminant 1.5 cm right kidney   lower pole partially exophytic cyst or solid nodule.   Stomach and bowel: Pancolonic diverticulosis is most pronounced in the   sigmoid colon.   Appendix: No evidence of appendicitis.   Intraperitoneal space: Unremarkable. No free air. No significant fluid   collection.   Vasculature: Coronary artery calcifications. Thoracic aortic   atherosclerotic calcifications. Moderate atherosclerotic calcifications   are present in the abdominal aorta. No aortic aneurysm.   Lymph nodes: Interval enlargement of a right common iliac lymph node   measures 1.7 x 2.1 cm. right perirectal nodule is unchanged.    Bladder: Diffuse urinary bladder wall thickening with adjacent stranding   suggests acute cystitis. Moderateright and mild left   hydroureteronephrosis, likely due to urinary bladder wall thickening.   Vega catheter in the urinary bladder. Right-sided partially exophytic   1.3 cm structure appears to be a diverticulum also on the prior.  Reproductive: Prostatomegaly.   Bones/joints: Left hip intramedullary isabella. No acute fracture or   dislocation. Degenerative changes are most pronounced in the lower lumbar   spine. Diffuse osteopenia.  Soft tissues: Unremarkable.     IMPRESSION: Limited examination secondary to exclusion of IV contrast.    1. Diffuse urinary bladder wall thickening with adjacent stranding   suggests acute cystitis. Underlying bladder neoplasm is not excluded.  2. Moderate right and mild left hydroureteronephrosis, likely due to   diffuse urinary bladder wall thickening.   3. Interval enlargement of a right common iliac lymph node measures 1.7 x   2.1 cm, either reactive to a pelvic inflammatory process or neoplastic.   4. Small bilateral pleural effusions.   5. Cholelithiasis.  6. Pancreatic head 3.2 cm cystic lesion is unchanged compared to   2017.   7. Minimal enlargement of an indeterminant 1.5 cm right kidney lower pole   partially exophytic cyst or solid nodule.

## 2019-10-15 NOTE — PROGRESS NOTE ADULT - PROBLEM SELECTOR PLAN 6
acute hyponatremia (127) likely 2/2 acute renal failure/hypovolemia  - Na improving  - hold sertraline for now  - Monitor bmp  - Nephology consult Dr. Escobedo acute hyponatremia (127) likely 2/2 acute renal failure/hypovolemia  - Na improving  - hold sertraline for now  - Monitor bmp  - Nephology consult Dr. Sánchez

## 2019-10-15 NOTE — GOALS OF CARE CONVERSATION - ADVANCED CARE PLANNING - NS PRO AD PATIENT TYPE ON CHART
Health Care Proxy (HCP) Health Care Proxy (HCP)/Medical Orders for Life-Sustaining Treatment (MOLST)

## 2019-10-15 NOTE — GOALS OF CARE CONVERSATION - ADVANCED CARE PLANNING - CONVERSATION DETAILS
met pt , unable to make any decisions, pt wife just left hospital to have a rest, pt daughter, Zuri present.  contacted Jacklyn at [facility] regarding dnr for pt, unsure if has molst, will fax documents they have for pt.  PC RN contact # given to daughter, will follow up when wife present. met pt , unable to make any decisions, pt wife just left hospital to have a rest, pt daughter, Zuri present.  contacted Jacklyn at [facility] regarding dnr for pt, unsure if has molst, will fax documents they have for pt.  PC RN contact # given to daughter, will follow up when wife present.      addendum: 10/16/19: 1115 hrs: met pt wife, further discussion for pt plan of care, home with hospice, events leading to hospitalization, molst reviewed: wife, hcp,  agrees to dnr dni no TF, wants antibiotics & trial IV fluids at this time, not to return to hospital, aware family will contact [facility] for symptom management.  Dr Matthews to complete molst form.  PC RN contact # given to wife, support to wife for decisions made.

## 2019-10-16 DIAGNOSIS — N18.4 CHRONIC KIDNEY DISEASE, STAGE 4 (SEVERE): ICD-10-CM

## 2019-10-16 DIAGNOSIS — Z51.5 ENCOUNTER FOR PALLIATIVE CARE: ICD-10-CM

## 2019-10-16 DIAGNOSIS — I48.19 OTHER PERSISTENT ATRIAL FIBRILLATION: ICD-10-CM

## 2019-10-16 DIAGNOSIS — N17.9 ACUTE KIDNEY FAILURE, UNSPECIFIED: ICD-10-CM

## 2019-10-16 DIAGNOSIS — D63.8 ANEMIA IN OTHER CHRONIC DISEASES CLASSIFIED ELSEWHERE: ICD-10-CM

## 2019-10-16 DIAGNOSIS — J18.9 PNEUMONIA, UNSPECIFIED ORGANISM: ICD-10-CM

## 2019-10-16 DIAGNOSIS — D68.9 COAGULATION DEFECT, UNSPECIFIED: ICD-10-CM

## 2019-10-16 LAB
ANION GAP SERPL CALC-SCNC: 12 MMOL/L — SIGNIFICANT CHANGE UP (ref 5–17)
APTT BLD: 47.6 SEC — HIGH (ref 28.5–37)
BUN SERPL-MCNC: 93 MG/DL — HIGH (ref 7–23)
CALCIUM SERPL-MCNC: 8.7 MG/DL — SIGNIFICANT CHANGE UP (ref 8.5–10.1)
CHLORIDE SERPL-SCNC: 111 MMOL/L — HIGH (ref 96–108)
CHLORIDE UR-SCNC: 37 MMOL/L — SIGNIFICANT CHANGE UP
CO2 SERPL-SCNC: 16 MMOL/L — LOW (ref 22–31)
CREAT ?TM UR-MCNC: 38 MG/DL — SIGNIFICANT CHANGE UP
CREAT SERPL-MCNC: 3.3 MG/DL — HIGH (ref 0.5–1.3)
GLUCOSE SERPL-MCNC: 69 MG/DL — LOW (ref 70–99)
HCT VFR BLD CALC: 30.8 % — LOW (ref 39–50)
HGB BLD-MCNC: 10.2 G/DL — LOW (ref 13–17)
INR BLD: 6.68 RATIO — CRITICAL HIGH (ref 0.88–1.16)
MAGNESIUM SERPL-MCNC: 2.2 MG/DL — SIGNIFICANT CHANGE UP (ref 1.6–2.6)
MCHC RBC-ENTMCNC: 31 PG — SIGNIFICANT CHANGE UP (ref 27–34)
MCHC RBC-ENTMCNC: 33.1 GM/DL — SIGNIFICANT CHANGE UP (ref 32–36)
MCV RBC AUTO: 93.6 FL — SIGNIFICANT CHANGE UP (ref 80–100)
NRBC # BLD: 0 /100 WBCS — SIGNIFICANT CHANGE UP (ref 0–0)
PHOSPHATE SERPL-MCNC: 5.7 MG/DL — HIGH (ref 2.5–4.5)
PLATELET # BLD AUTO: 288 K/UL — SIGNIFICANT CHANGE UP (ref 150–400)
POTASSIUM SERPL-MCNC: 4.1 MMOL/L — SIGNIFICANT CHANGE UP (ref 3.5–5.3)
POTASSIUM SERPL-SCNC: 4.1 MMOL/L — SIGNIFICANT CHANGE UP (ref 3.5–5.3)
PROT ?TM UR-MCNC: 93 MG/DL — HIGH (ref 0–12)
PROT/CREAT UR-RTO: 2.5 RATIO — HIGH (ref 0–0.2)
PROTHROM AB SERPL-ACNC: 80.8 SEC — HIGH (ref 10–12.9)
RBC # BLD: 3.29 M/UL — LOW (ref 4.2–5.8)
RBC # FLD: 14.2 % — SIGNIFICANT CHANGE UP (ref 10.3–14.5)
SODIUM SERPL-SCNC: 139 MMOL/L — SIGNIFICANT CHANGE UP (ref 135–145)
SODIUM UR-SCNC: 54 MMOL/L — SIGNIFICANT CHANGE UP
WBC # BLD: 10.77 K/UL — HIGH (ref 3.8–10.5)
WBC # FLD AUTO: 10.77 K/UL — HIGH (ref 3.8–10.5)

## 2019-10-16 PROCEDURE — 99223 1ST HOSP IP/OBS HIGH 75: CPT

## 2019-10-16 PROCEDURE — 70450 CT HEAD/BRAIN W/O DYE: CPT | Mod: 26

## 2019-10-16 PROCEDURE — 99233 SBSQ HOSP IP/OBS HIGH 50: CPT | Mod: GC

## 2019-10-16 PROCEDURE — 99255 IP/OBS CONSLTJ NEW/EST HI 80: CPT

## 2019-10-16 RX ORDER — PHYTONADIONE (VIT K1) 5 MG
2.5 TABLET ORAL ONCE
Refills: 0 | Status: COMPLETED | OUTPATIENT
Start: 2019-10-16 | End: 2019-10-16

## 2019-10-16 RX ORDER — SODIUM CHLORIDE 9 MG/ML
1000 INJECTION, SOLUTION INTRAVENOUS
Refills: 0 | Status: DISCONTINUED | OUTPATIENT
Start: 2019-10-16 | End: 2019-10-19

## 2019-10-16 RX ADMIN — CEFTRIAXONE 100 MILLIGRAM(S): 500 INJECTION, POWDER, FOR SOLUTION INTRAMUSCULAR; INTRAVENOUS at 21:48

## 2019-10-16 RX ADMIN — AZITHROMYCIN 255 MILLIGRAM(S): 500 TABLET, FILM COATED ORAL at 21:48

## 2019-10-16 RX ADMIN — Medication 2.5 MILLIGRAM(S): at 23:49

## 2019-10-16 RX ADMIN — Medication 50 MILLIGRAM(S): at 17:39

## 2019-10-16 RX ADMIN — SODIUM CHLORIDE 50 MILLILITER(S): 9 INJECTION, SOLUTION INTRAVENOUS at 11:40

## 2019-10-16 RX ADMIN — Medication 50 MILLIGRAM(S): at 05:34

## 2019-10-16 NOTE — DIETITIAN INITIAL EVALUATION ADULT. - PROBLEM SELECTOR PLAN 5
pro BNP 48,000  - holding home furosemide 40mg every other day, 20mg every other day and KCl 8meq every other day in setting of acute renal failure

## 2019-10-16 NOTE — DIETITIAN INITIAL EVALUATION ADULT. - PROBLEM SELECTOR PLAN 4
has baseline dementia - wife reports patient becoming more weak and requiring soft diet over last week, while normally tolerating regular diet  metabolic encephalopathy likely 2/2 infectious pneumonia vs UTI vs hyponatremia  - continue IV antibiotic treatment with ceftriaxone and azithromycin, s/p 2L IV fluids  - f/u CT abd pelvis  - f/u strep antigen, legionella urine antigen, sputum cultures, blood cultures, urine cultures

## 2019-10-16 NOTE — DIETITIAN INITIAL EVALUATION ADULT. - PROBLEM SELECTOR PLAN 3
CAP vs atelectasis on CXR  - f/u legionella/strep antigen, sputum cultures, blood cultures  - r/o legionella in setting of hyponatremia and diarrhea  - continue azithromycin (pending legionella ag) and ceftriaxone

## 2019-10-16 NOTE — SWALLOW BEDSIDE ASSESSMENT ADULT - COMMENTS
Pt was awake, cooperative though lethargy noted for a clinical assessment of swallow function this PM. Pt's wife present at bedside. Per charting, pt is a "89 y/o M with PMHx of systolic/diastolic CHF (EF 38%-60% in 4/2018), atrial fibrillation (on warfarin), HTN, HLD, BPH, bladder neoplasm, prostate cancer s/p radiation, depression, gout admitted with urinary retention, suspected UTI, hyponatremia, KAILEE on CKD IV and suspected pneumonia." Recent CXR revealed " Small bilateral pleural effusions with adjacent atelectasis and/or pneumonia on the left."    At the time of today's assessment, pt's wife reports decreased PO intake prior to admission.

## 2019-10-16 NOTE — CHART NOTE - NSCHARTNOTEFT_GEN_A_CORE
Upon Nutritional Assessment by the Registered Dietitian your patient was determined to meet criteria / has evidence of the following diagnosis/diagnoses:          [ x]  Mild Protein Calorie Malnutrition        [ ]  Moderate Protein Calorie Malnutrition        [ ] Severe Protein Calorie Malnutrition        [ ] Unspecified Protein Calorie Malnutrition        [ ] Underweight / BMI <19        [ ] Morbid Obesity / BMI > 40      Findings as based on:  •  Comprehensive nutrition assessment and consultation  •  Food acceptance and intake status from observations by staff    patient with mild acute malnutrition with observed mild orbital fat loss and moderate temple clavicle and shoulder muscle mass loss as well as presumed inadequte energy intake   Treatment:    The following diet has been recommended:  suggest liberalize to mechanical soft low Na diet with ensure enlive daily     PROVIDER Section:     By signing this assessment you are acknowledging and agree with the diagnosis/diagnoses assigned by the Registered Dietitian    Comments:

## 2019-10-16 NOTE — CONSULT NOTE ADULT - SUBJECTIVE AND OBJECTIVE BOX
Date/Time Patient Seen:  		  Referring MD:   Data Reviewed	       Patient is a 89y old  Male who presents with a chief complaint of cough, weakness, urinary retention (16 Oct 2019 11:14)      Subjective/HPI    in bed  seen and examined  vs and meds reviewed  labs reviewed  H and P reviewed  pt is on Home [facility]  now in [facility] for eval of Acute Infection - and management of KAILEE CKD -   follows with Dr. Brownlee  follows with Dr. Hernandez    spoke with family at bedside -     History and Physical:   Source of Information	Chart(s), Patient, Spouse/Significant Other  Outpatient Providers	PMD Dr. Jennifer Estrella  Cardio Dr. Hernandez  Urology Dr. Brownlee     Language:  · Patient/Family of Limited English Proficiency	No     Patient Identity:  · Birth Sex	Unknown       History of Present Illness:  Reason for Admission: cough, weakness, urinary retention  History of Present Illness:   89 y/o M with PMHx of systolic/diastolic CHF (EF 38%-60% in 4/2018), atrial fibrillation (on warfarin), HTN, HLD, BPH, bladder neoplasm, prostate cancer s/p radiation, depression, gout presented to ED due to cough and no urine output for >24 hours. Patient is with chronic vega, changed one week ago. Was started on macrobid as outpatient (diagnosed with UTI over the phone per wife), had failed antibiotic therapy prior to this. Patient became more weak and "delirious" 2 days prior to admission with several episodes of diarrhea.  In the ED, urine in Vega bag was noted to be grossly pyuric. Multiple attempts made by nursing and ER physician to replace vega - US bladder without visualization of catheter and patient anxious/in discomfort. History obtained by wife due to patient's dementia. States that patient has not seen urology Dr. Brownlee for a while now.  Per wife, patient has been having cloudy urine on and off for 3 months without any treatment.      In the ED, VS significant for Tmax 100.3 (rectal), . Labs significant for: WBC 20.63, Na 127, BUN/Cr 115/4.6. CXR showed small bilateral effusions, ?left infiltrate vs atelectasis. US bladder did not visualize vega catheter - showed mildly distended urinary bladder. EKG showed atrial fibrillation 97 with LAFB.      FAMILY HISTORY:  Family history of cancer.     Social History:  Social History (marital status, living situation, occupation, tobacco use, alcohol and drug use, and sexual history): Lives at home with wife  	No tobacco use  	No EtOH use  No illicit drug use     Tobacco Screening:  · Core Measure Site	Yes  · Has the patient used tobacco in the past 30 days?	No    Risk Assessment:    Present on Admission:  Deep Venous Thrombosis	no  Pulmonary Embolus	no  Urinary Catheter	yes  POA, attempted replacement in ER  Central Venous Catheter/PICC Line	no  Surgical Site Incision	no  Pressure Ulcer(s)	yes  sacral stage 1     Heart Failure:     PAST MEDICAL & SURGICAL HISTORY:  Chronic kidney disease (CKD), stage IV (severe)  Prostate cancer: chronic vega  Hyperlipidemia  Hypertension  Atrial fibrillation  [place] (hard of hearing)  Bladder neoplasm  Congestive heart failure: hospitalized  once 12/2010  Acute congestive heart failure: 12/2010  Prostate cancer: 9/2011 - s/p external radiation treatments - last 12/9/11, s/p Bicalvutamide  Gout  Depression  BPH (Benign Prostatic Hypertrophy)  Hypertension  Hyperlipidemia  Atrial Fibrillation: 1990  History of repair of hip fracture  Circumcision: age 5  History of Arthroscopy of Knee: left  S/P Cataract Extraction: LEFT  S/P Tonsillectomy        Medication list         MEDICATIONS  (STANDING):  azithromycin  IVPB 500 milliGRAM(s) IV Intermittent every 24 hours  cefTRIAXone   IVPB 1000 milliGRAM(s) IV Intermittent every 24 hours  cholecalciferol 400 Unit(s) Oral daily  dextrose 5% + sodium chloride 0.45%. 1000 milliLiter(s) (50 mL/Hr) IV Continuous <Continuous>  influenza   Vaccine 0.5 milliLiter(s) IntraMuscular once  metoprolol succinate ER 50 milliGRAM(s) Oral two times a day    MEDICATIONS  (PRN):         Vitals log        ICU Vital Signs Last 24 Hrs  T(C): 36.4 (16 Oct 2019 04:30), Max: 36.4 (15 Oct 2019 16:32)  T(F): 97.6 (16 Oct 2019 04:30), Max: 97.6 (16 Oct 2019 04:30)  HR: 109 (16 Oct 2019 07:52) (84 - 112)  BP: 118/73 (16 Oct 2019 04:30) (118/73 - 148/77)  BP(mean): --  ABP: --  ABP(mean): --  RR: 19 (16 Oct 2019 04:30) (18 - 19)  SpO2: 97% (16 Oct 2019 07:52) (96% - 99%)           Input and Output:  I&O's Detail    15 Oct 2019 07:01  -  16 Oct 2019 07:00  --------------------------------------------------------  IN:  Total IN: 0 mL    OUT:    Indwelling Catheter - Urethral: 1850 mL  Total OUT: 1850 mL    Total NET: -1850 mL      16 Oct 2019 07:01  -  16 Oct 2019 12:43  --------------------------------------------------------  IN:    dextrose 5% + sodium chloride 0.45%.: 50 mL  Total IN: 50 mL    OUT:  Total OUT: 0 mL    Total NET: 50 mL          Lab Data                        10.2   10.77 )-----------( 288      ( 16 Oct 2019 08:15 )             30.8     10-16    139  |  111<H>  |  93<H>  ----------------------------<  69<L>  4.1   |  16<L>  |  3.30<H>    Ca    8.7      16 Oct 2019 08:15  Phos  5.7     10-16  Mg     2.2     10-16    TPro  6.3  /  Alb  2.0<L>  /  TBili  0.3  /  DBili  x   /  AST  63<H>  /  ALT  35  /  AlkPhos  141<H>  10-15            Review of Systems	  weak      Objective     Physical Examination    heart s1s2  lung dec BS  abd soft      Pertinent Lab findings & Imaging      Alison:  NO   Adequate UO     I&O's Detail    15 Oct 2019 07:01  -  16 Oct 2019 07:00  --------------------------------------------------------  IN:  Total IN: 0 mL    OUT:    Indwelling Catheter - Urethral: 1850 mL  Total OUT: 1850 mL    Total NET: -1850 mL      16 Oct 2019 07:01  -  16 Oct 2019 12:43  --------------------------------------------------------  IN:    dextrose 5% + sodium chloride 0.45%.: 50 mL  Total IN: 50 mL    OUT:  Total OUT: 0 mL    Total NET: 50 mL               Discussed with:     Cultures:	        Radiology    EXAM:  CT ABDOMEN AND PELVIS                            PROCEDURE DATE:  10/15/2019          INTERPRETATION:  [facility] RADIOLOGIST PRELIMINARY REPORT - [facility] Radiologist   Dr. Alton Esparza    PROCEDURE INFORMATION:   Exam: CT Abdomen And Pelvis Without Contrast   Exam date and time: 10/15/2019 5:14 AM   Clinical history: 89 years old, male; Condition or disease; Other:   Pyuria, retention, ckd     TECHNIQUE:   Imaging protocol: Computed tomography of the abdomen and pelvis without   contrast.     COMPARISON:   CT ABDOMEN AND PELVIS 9/12/2017 7:47 AM     FINDINGS:   Pleural space: Small bilateral pleural effusions.   Heart: Cardiomegaly.     Liver: Normal. No mass.   Gallbladder and bile ducts: Multiple small calcified gallstones.   Pancreas: Pancreatic head 3.2 cm cystic lesion is unchanged compared to   09/12/2017.   Spleen: Normal. No splenomegaly.   Adrenals: Normal. No mass.   Kidneys and ureters: Many bilateral renal cysts most of which are simple   appearing. Minimal enlargement of an indeterminant 1.5 cm right kidney   lower pole partially exophytic cyst or solid nodule.   Stomach and bowel: Pancolonic diverticulosis is most pronounced in the   sigmoid colon.   Appendix: No evidence of appendicitis.   Intraperitoneal space: Unremarkable. No free air. No significant fluid   collection.   Vasculature: Coronary artery calcifications. Thoracic aortic   atherosclerotic calcifications. Moderate atherosclerotic calcifications   are present in the abdominal aorta. No aortic aneurysm.   Lymph nodes: Interval enlargement of a right common iliac lymph node   measures 1.7 x 2.1 cm. right perirectal nodule is unchanged.    Bladder: Diffuse urinary bladder wall thickening with adjacent stranding   suggests acute cystitis. Moderate right and mild left   hydroureteronephrosis, likely due to urinary bladder wall thickening.   Vega catheter in the urinary bladder. Right-sided partially exophytic   1.3 cm structure appears to be a diverticulum also on the prior.  Reproductive: Prostatomegaly.   Bones/joints: Left hip intramedullary isabella. No acute fracture or   dislocation. Degenerative changes are most pronounced in the lower lumbar   spine. Diffuse osteopenia.  Soft tissues: Unremarkable.     IMPRESSION: Limited examination secondary to exclusion of IV contrast.    1. Diffuse urinary bladder wall thickening with adjacent stranding   suggests acute cystitis. Underlying bladder neoplasm is not excluded.  2. Moderate right and mild left hydroureteronephrosis, likely due to   diffuse urinary bladder wall thickening.   3. Interval enlargement of a right common iliac lymph node measures 1.7 x   2.1 cm, either reactive to a pelvic inflammatory process or neoplastic.   4. Small bilateral pleural effusions.   5. Cholelithiasis.   6. Pancreatic head 3.2 cm cystic lesion is unchanged compared to   09/12/2017.   7. Minimal enlargement of an indeterminant 1.5 cm right kidney lower pole   partially exophytic cyst or solid nodule.      AGREE WITH THE ABOVE FINDINGS AND REPORT                MIKI NICOLE M.D., ATTENDING RADIOLOGIST  This document has been electronically signed. Oct 15 2019  8:28AM

## 2019-10-16 NOTE — CONSULT NOTE ADULT - SUBJECTIVE AND OBJECTIVE BOX
Patient is a 89y old  Male who presents with a chief complaint of cough, weakness, urinary retention (16 Oct 2019 12:43)      HPI:  89 y/o M with PMHx of systolic/diastolic CHF (EF 38%-60% in 2018), atrial fibrillation (on warfarin), HTN, HLD, BPH, bladder neoplasm, prostate cancer s/p radiation, depression, gout presented to ED due to cough and no urine output for >24 hours. Patient is with chronic vega, changed one week ago. Was started on macrobid as outpatient (diagnosed with UTI over the phone per wife), had failed antibiotic therapy prior to this. Patient became more weak and "delirious" 2 days prior to admission with several episodes of diarrhea.  In the ED, urine in Vega bag was noted to be grossly pyuric. Multiple attempts made by nursing and ER physician to replace vega - US bladder without visualization of catheter and patient anxious/in discomfort. History obtained by wife due to patient's dementia. States that patient has not seen urology Dr. Brownlee for a while now.  Per wife, patient has been having cloudy urine on and off for 3 months without any treatment.      In the ED, VS significant for Tmax 100.3 (rectal), . Labs significant for: WBC 20.63, Na 127, BUN/Cr 115/4.6. CXR showed small bilateral effusions, ?left infiltrate vs atelectasis. US bladder did not visualize vega catheter - showed mildly distended urinary bladder. EKG showed atrial fibrillation 97 with LAFB. (14 Oct 2019 23:41)    Heme asked to consult for coagulopathy.  Pt with dementia, poor historian.   Denies bleeding, epistaxis, melena, CP, SOB, LH  Admitted with urinary retention sx.   Possible UTI, hyponatremia   KAILEE on CKD stage 4, and suspected pneumonia       Acute on chronic combined systolic and diastolic congestive heart failure. On Lasix 40mg alt 20mg.   Pro-BNP 48,779 --> 49,323   Hold home furosemide 40mg every other day, 20mg every other day and   Chronic hypokalemia, KCl 8meq every other day  Carotid Doppler  - mild-moderate plaque   Stress Test: 12/15-Chem, no Ischemia, ?Inf-Apical MI, EF=42%   Echo: 2018, mild MR, TR, PAP=46 LVEF 38-60%.       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   Repeat EKG 10/15, post beats r/o v-tach overnight, showed AF w premature or aberrantly conducted complexes.   On warfarin (recently decreased dose in setting of infection and kidney failure)   Was started on macrobid as outpatient      PAST MEDICAL & SURGICAL HISTORY:  Chronic kidney disease (CKD), stage IV (severe)  Prostate cancer: chronic vega  Hyperlipidemia  Hypertension  Atrial fibrillation  [place] (hard of hearing)  Bladder neoplasm  Congestive heart failure: hospitalized  once 2010  Acute congestive heart failure: 2010  Prostate cancer: 2011 - s/p external radiation treatments - last 11, s/p Bicalvutamide  Gout  Depression  Hypertension  Hyperlipidemia  Atrial Fibrillation: 1990  History of repair of hip fracture  Circumcision: age 5  History of Arthroscopy of Knee: left  S/P Cataract Extraction: LEFT  S/P Tonsillectomy      HEALTH ISSUES - PROBLEM Dx:  Palliative care encounter: Palliative care encounter  Acute on chronic combined systolic and diastolic congestive heart failure: Acute on chronic combined systolic and diastolic congestive heart failure  Urinary tract infection associated with indwelling urethral catheter, initial encounter: Urinary tract infection associated with indwelling urethral catheter, initial encounter  Altered mental state: Altered mental state  Need for prophylactic measure: Need for prophylactic measure  Depression: Depression  Chronic combined systolic and diastolic congestive heart failure: Chronic combined systolic and diastolic congestive heart failure  Atrial fibrillation: Atrial fibrillation  Diarrhea: Diarrhea  Hyponatremia: Hyponatremia  Leukocytosis: Leukocytosis  Acute renal failure: Acute renal failure  Pneumonia: Pneumonia  UTI (urinary tract infection): UTI (urinary tract infection)  Prostate cancer: Prostate cancer  Urinary retention: Urinary retention          Pneumonia due to organism (J18.9)  Chronic kidney disease (CKD), stage IV (severe) (N18.4)  Prostate cancer (C61)  Hyperlipidemia (E78.5)  Hypertension (I10)  Atrial fibrillation (I48.91)  [place] (hard of hearing) (389.9)  Bladder neoplasm (239.4)  Congestive heart failure (428.0)  Acute congestive heart failure (428.0)  Prostate cancer (185)  Gout (274.9)  Depression (311)  BPH (Benign Prostatic Hypertrophy) (600.00)  Hypertension (401.9)  Hyperlipidemia (272.4)  Atrial Fibrillation (427.31)  History of repair of hip fracture (Z98.890)  Circumcision (V50.2)  History of Arthroscopy of Knee (V45.89)  S/P Cataract Extraction (V45.61)  S/P Tonsillectomy (V45.89)  Leukocytosis (D72.829)  Acute renal failure (N17.9)      FAMILY HISTORY:  Family history of cancer        [SOCIAL HISTORY: ]     smoking:  ex-smoker     EtOH:  denies     illicit drugs:  denies     occupation:  retired     , lives with wife      [ALLERGIES/INTOLERANCES:]  Allergies     Hay Fever (Rhinorrhea)     No Known Drug Allergies  Intolerances          [MEDICATIONS]  MEDICATIONS  (STANDING):  azithromycin  IVPB 500 milliGRAM(s) IV Intermittent every 24 hours  cefTRIAXone   IVPB 1000 milliGRAM(s) IV Intermittent every 24 hours  cholecalciferol 400 Unit(s) Oral daily  dextrose 5% + sodium chloride 0.45%. 1000 milliLiter(s) (50 mL/Hr) IV Continuous <Continuous>  influenza   Vaccine 0.5 milliLiter(s) IntraMuscular once  metoprolol succinate ER 50 milliGRAM(s) Oral two times a day    MEDICATIONS  (PRN):        [REVIEW OF SYSTEMS: ]  Limited due to dementia. As per chart, family.   CONSTITUTIONAL: +delerium, no fever, no shakes, no chills   EYES: No eye pain, no visual disturbances, no discharge  ENMT:  no discharge  NECK: No pain, no stiffness  BREASTS: No pain, no masses, no nipple discharge  RESPIRATORY: +cough, no wheezing, no chills, no hemoptysis; No shortness of breath  CARDIOVASCULAR: No chest pain, no palpitations, no dizziness, no leg swelling  GASTROINTESTINAL: No abdominal, no epigastric pain. No nausea, no vomiting, no hematemesis; +diarrhea , no constipation. No melena, no hematochezia.  GENITOURINARY: No dysuria, no frequency, no hematuria, no incontinence, +retention  NEUROLOGICAL: No headaches, no memory loss, no loss of strength, no numbness, no tremors  SKIN: No itching, no burning, no rashes, no lesions   LYMPH NODES: No enlarged glands  ENDOCRINE: No heat or cold intolerance; No hair loss  MUSCULOSKELETAL: No joint pain or swelling; No muscle, no back, no extremity pain  PSYCHIATRIC: No depression, +anxiety, no mood swings, no difficulty sleeping  HEME/LYMPH: No easy bruising, no bleeding gums      [VITALS SIGNS 24hrs]  Vital Signs Last 24 Hrs  T(C): 36.8 (16 Oct 2019 20:32), Max: 37 (16 Oct 2019 14:00)  T(F): 98.3 (16 Oct 2019 20:32), Max: 98.6 (16 Oct 2019 14:00)  HR: 92 (16 Oct 2019 20:32) (84 - 109)  BP: 128/86 (16 Oct 2019 20:32) (113/77 - 134/83)  BP(mean): --  RR: 17 (16 Oct 2019 20:32) (17 - 19)  SpO2: 100% (16 Oct 2019 20:32) (95% - 100%)    [PHYSICAL EXAM]  General: adult in NAD,  WN,  WD.  HEENT: clear oropharynx, anicteric sclera, pink conjunctivae.  Neck: supple, no masses.  CV: faint S1S2, no murmur, no rubs, no gallops.  Lungs: clear to auscultation, no wheezes, no rales, no rhonchi.  Abdomen: soft, non-tender, non-distended, no hepatosplenomegaly, normal BS, no guarding.  Ext: no clubbing, no cyanosis, trace edema.  Skin: no rashes,  no petechiae, no venous stasis changes.  Neuro: alert and oriented X1, no focal motor deficits.  LN: no SC GRACE.      [LABS:]                        10.2   10.77 )-----------( 288      ( 16 Oct 2019 08:15 )             30.8     CBC Full  -  ( 16 Oct 2019 08:15 )  WBC Count : 10.77 K/uL  RBC Count : 3.29 M/uL  Hemoglobin : 10.2 g/dL  Hematocrit : 30.8 %  Platelet Count - Automated : 288 K/uL  Mean Cell Volume : 93.6 fl  Mean Cell Hemoglobin : 31.0 pg  Mean Cell Hemoglobin Concentration : 33.1 gm/dL  Auto Neutrophil # : x  Auto Lymphocyte # : x  Auto Monocyte # : x  Auto Eosinophil # : x  Auto Basophil # : x  Auto Neutrophil % : x  Auto Lymphocyte % : x  Auto Monocyte % : x  Auto Eosinophil % : x  Auto Basophil % : x    10-16    139  |  111<H>  |  93<H>  ----------------------------<  69<L>  4.1   |  16<L>  |  3.30<H>    Ca    8.7      16 Oct 2019 08:15  Phos  5.7     10-16  Mg     2.2     10-16    TPro  6.3  /  Alb  2.0<L>  /  TBili  0.3  /  DBili  x   /  AST  63<H>  /  ALT  35  /  AlkPhos  141<H>  10-15    PT/INR - ( 16 Oct 2019 08:15 )   PT: 80.8 sec;   INR: 6.68 ratio         PTT - ( 16 Oct 2019 08:15 )  PTT:47.6 sec  LIVER FUNCTIONS - ( 15 Oct 2019 04:05 )  Alb: 2.0 g/dL / Pro: 6.3 g/dL / ALK PHOS: 141 U/L / ALT: 35 U/L / AST: 63 U/L / GGT: x               Urinalysis Basic - ( 15 Oct 2019 01:19 )  Color: Pale Yellow / Appearance: Turbid / S.015 / pH: x  Gluc: x / Ketone: Negative  / Bili: Negative / Urobili: Negative   Blood: x / Protein: 150 mg/dL / Nitrite: Negative   Leuk Esterase: Moderate / RBC: 6-10 /HPF / WBC 11-25   Sq Epi: x / Non Sq Epi: Occasional / Bacteria: Many        WBC  TREND (5 Days)  WBC Count: 10.77 K/uL (10-16 @ 08:15)  WBC Count: 21.72 K/uL (10-15 @ 04:05)  WBC Count: 20.63 K/uL (10-14 @ 20:48)    HGB  TREND (5 Days)  Hemoglobin: 10.2 g/dL (10-16 @ 08:15)  Hemoglobin: 11.2 g/dL (10-15 @ 04:05)  Hemoglobin: 11.7 g/dL (10-14 @ 20:48)    HCT  TREND (5 Days)  Hematocrit: 30.8 % (10-16 @ 08:15)  Hematocrit: 33.3 % (10-15 @ 04:05)  Hematocrit: 34.7 % (10-14 @ 20:48)    PLT  TREND (5 Days)  Platelet Count - Automated: 288 K/uL (10-16 @ 08:15)  Platelet Count - Automated: 308 K/uL (10-15 @ 04:05)  Platelet Count - Automated: 328 K/uL (10-14 @ 20:48)      Culture - Urine (10.15. @ 09:11)    Specimen Source: .Urine Catheterized    Culture Results:   >100,000 CFU/ml Gram Negative Rods      Culture - Blood (10.14.19 @ 22:58)    Specimen Source: .Blood Blood-Peripheral    Culture Results:   No growth to date.      Culture - Blood (10.14.19 @ 22:58)    Specimen Source: .Blood Blood-Peripheral    Culture Results:   No growth to date.        [RADIOLOGY & ADDITIONAL STUDIES:] Patient is a 89y old  Male who presents with a chief complaint of cough, weakness, urinary retention (16 Oct 2019 12:43)      HPI:  89 y/o M with PMHx of systolic/diastolic CHF (EF 38%-60% in 2018), atrial fibrillation (on warfarin), HTN, HLD, BPH, bladder neoplasm, prostate cancer s/p radiation, depression, gout presented to ED due to cough and no urine output for >24 hours. Patient is with chronic vega, changed one week ago. Was started on macrobid as outpatient (diagnosed with UTI over the phone per wife), had failed antibiotic therapy prior to this. Patient became more weak and "delirious" 2 days prior to admission with several episodes of diarrhea.  In the ED, urine in Vega bag was noted to be grossly pyuric. Multiple attempts made by nursing and ER physician to replace vega - US bladder without visualization of catheter and patient anxious/in discomfort. History obtained by wife due to patient's dementia. States that patient has not seen urology Dr. Brownlee for a while now.  Per wife, patient has been having cloudy urine on and off for 3 months without any treatment.      In the ED, VS significant for Tmax 100.3 (rectal), . Labs significant for: WBC 20.63, Na 127, BUN/Cr 115/4.6. CXR showed small bilateral effusions, ?left infiltrate vs atelectasis. US bladder did not visualize vega catheter - showed mildly distended urinary bladder. EKG showed atrial fibrillation 97 with LAFB. (14 Oct 2019 23:41)    Heme asked to consult for coagulopathy.  Pt with dementia, poor historian.   Denies bleeding, epistaxis, melena, CP, SOB, LH  Admitted with urinary retention sx.   Possible UTI, hyponatremia   KAILEE on CKD stage 4, and suspected pneumonia       Acute on chronic combined systolic and diastolic congestive heart failure. On Lasix 40mg alt 20mg.   Pro-BNP 48,779 --> 49,323   Hold home furosemide 40mg every other day, 20mg every other day and   Chronic hypokalemia, KCl 8meq every other day  Carotid Doppler  - mild-moderate plaque   Stress Test: 12/15-Chem, no Ischemia, ?Inf-Apical MI, EF=42%   Echo: 2018, mild MR, TR, PAP=46 LVEF 38-60%.       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   Repeat EKG 10/15, post beats r/o v-tach overnight, showed AF w premature or aberrantly conducted complexes.   On warfarin (recently decreased dose in setting of infection and kidney failure)   Was started on macrobid as outpatient      PAST MEDICAL & SURGICAL HISTORY:  Chronic kidney disease (CKD), stage IV (severe)  Prostate cancer: chronic vega  Hyperlipidemia  Hypertension  Atrial fibrillation  [place] (hard of hearing)  Bladder neoplasm  Congestive heart failure: hospitalized  once 2010  Acute congestive heart failure: 2010  Prostate cancer: 2011 - s/p external radiation treatments - last 11, s/p Bicalvutamide  Gout  Depression  Hypertension  Hyperlipidemia  Atrial Fibrillation: 1990  History of repair of hip fracture  Circumcision: age 5  History of Arthroscopy of Knee: left  S/P Cataract Extraction: LEFT  S/P Tonsillectomy      HEALTH ISSUES - PROBLEM Dx:  Palliative care encounter: Palliative care encounter  Acute on chronic combined systolic and diastolic congestive heart failure: Acute on chronic combined systolic and diastolic congestive heart failure  Urinary tract infection associated with indwelling urethral catheter, initial encounter: Urinary tract infection associated with indwelling urethral catheter, initial encounter  Altered mental state: Altered mental state  Need for prophylactic measure: Need for prophylactic measure  Depression: Depression  Chronic combined systolic and diastolic congestive heart failure: Chronic combined systolic and diastolic congestive heart failure  Atrial fibrillation: Atrial fibrillation  Diarrhea: Diarrhea  Hyponatremia: Hyponatremia  Leukocytosis: Leukocytosis  Acute renal failure: Acute renal failure  Pneumonia: Pneumonia  UTI (urinary tract infection): UTI (urinary tract infection)  Prostate cancer: Prostate cancer  Urinary retention: Urinary retention          Pneumonia due to organism (J18.9)  Chronic kidney disease (CKD), stage IV (severe) (N18.4)  Prostate cancer (C61)  Hyperlipidemia (E78.5)  Hypertension (I10)  Atrial fibrillation (I48.91)  [place] (hard of hearing) (389.9)  Bladder neoplasm (239.4)  Congestive heart failure (428.0)  Acute congestive heart failure (428.0)  Prostate cancer (185)  Gout (274.9)  Depression (311)  BPH (Benign Prostatic Hypertrophy) (600.00)  Hypertension (401.9)  Hyperlipidemia (272.4)  Atrial Fibrillation (427.31)  History of repair of hip fracture (Z98.890)  Circumcision (V50.2)  History of Arthroscopy of Knee (V45.89)  S/P Cataract Extraction (V45.61)  S/P Tonsillectomy (V45.89)  Leukocytosis (D72.829)  Acute renal failure (N17.9)      FAMILY HISTORY:  Family history of cancer        [SOCIAL HISTORY: ]     smoking:  ex-smoker     EtOH:  denies     illicit drugs:  denies     occupation:  retired     , lives with wife      [ALLERGIES/INTOLERANCES:]  Allergies     Hay Fever (Rhinorrhea)     No Known Drug Allergies  Intolerances          [MEDICATIONS]  MEDICATIONS  (STANDING):  azithromycin  IVPB 500 milliGRAM(s) IV Intermittent every 24 hours  cefTRIAXone   IVPB 1000 milliGRAM(s) IV Intermittent every 24 hours  cholecalciferol 400 Unit(s) Oral daily  dextrose 5% + sodium chloride 0.45%. 1000 milliLiter(s) (50 mL/Hr) IV Continuous <Continuous>  influenza   Vaccine 0.5 milliLiter(s) IntraMuscular once  metoprolol succinate ER 50 milliGRAM(s) Oral two times a day    MEDICATIONS  (PRN):        [REVIEW OF SYSTEMS: ]  Limited due to dementia. As per chart, family.   CONSTITUTIONAL: +delerium, no fever, no shakes, no chills   EYES: No eye pain, no visual disturbances, no discharge  ENMT:  no discharge  NECK: No pain, no stiffness  BREASTS: No pain, no masses, no nipple discharge  RESPIRATORY: +cough, no wheezing, no chills, no hemoptysis; No shortness of breath  CARDIOVASCULAR: No chest pain, no palpitations, no dizziness, no leg swelling  GASTROINTESTINAL: No abdominal, no epigastric pain. No nausea, no vomiting, no hematemesis; +diarrhea , no constipation. No melena, no hematochezia.  GENITOURINARY: No dysuria, no frequency, no hematuria, no incontinence, +retention  NEUROLOGICAL: No headaches, no memory loss, no loss of strength, no numbness, no tremors  SKIN: No itching, no burning, no rashes, no lesions   LYMPH NODES: No enlarged glands  ENDOCRINE: No heat or cold intolerance; No hair loss  MUSCULOSKELETAL: No joint pain or swelling; No muscle, no back, no extremity pain  PSYCHIATRIC: No depression, +anxiety, no mood swings, no difficulty sleeping  HEME/LYMPH: No easy bruising, no bleeding gums      [VITALS SIGNS 24hrs]  Vital Signs Last 24 Hrs  T(C): 36.8 (16 Oct 2019 20:32), Max: 37 (16 Oct 2019 14:00)  T(F): 98.3 (16 Oct 2019 20:32), Max: 98.6 (16 Oct 2019 14:00)  HR: 92 (16 Oct 2019 20:32) (84 - 109)  BP: 128/86 (16 Oct 2019 20:32) (113/77 - 134/83)  BP(mean): --  RR: 17 (16 Oct 2019 20:32) (17 - 19)  SpO2: 100% (16 Oct 2019 20:32) (95% - 100%)    [PHYSICAL EXAM]  General: adult in NAD,  WN,  WD.  HEENT: clear oropharynx, anicteric sclera, pink conjunctivae.  Neck: supple, no masses.  CV: faint S1S2, no murmur, no rubs, no gallops.  Lungs: clear to auscultation, no wheezes, no rales, no rhonchi.  Abdomen: soft, non-tender, non-distended, no hepatosplenomegaly, normal BS, no guarding.  Ext: no clubbing, no cyanosis, trace edema.  Skin: no rashes,  no petechiae, no venous stasis changes.  Neuro: alert and oriented X1, no focal motor deficits.  LN: no SC GRACE.      [LABS:]                        10.2   10.77 )-----------( 288      ( 16 Oct 2019 08:15 )             30.8     CBC Full  -  ( 16 Oct 2019 08:15 )  WBC Count : 10.77 K/uL  RBC Count : 3.29 M/uL  Hemoglobin : 10.2 g/dL  Hematocrit : 30.8 %  Platelet Count - Automated : 288 K/uL  Mean Cell Volume : 93.6 fl  Mean Cell Hemoglobin : 31.0 pg  Mean Cell Hemoglobin Concentration : 33.1 gm/dL  Auto Neutrophil # : x  Auto Lymphocyte # : x  Auto Monocyte # : x  Auto Eosinophil # : x  Auto Basophil # : x  Auto Neutrophil % : x  Auto Lymphocyte % : x  Auto Monocyte % : x  Auto Eosinophil % : x  Auto Basophil % : x    10-16    139  |  111<H>  |  93<H>  ----------------------------<  69<L>  4.1   |  16<L>  |  3.30<H>    Ca    8.7      16 Oct 2019 08:15  Phos  5.7     10-16  Mg     2.2     10-16    TPro  6.3  /  Alb  2.0<L>  /  TBili  0.3  /  DBili  x   /  AST  63<H>  /  ALT  35  /  AlkPhos  141<H>  10-15    PT/INR - ( 16 Oct 2019 08:15 )   PT: 80.8 sec;   INR: 6.68 ratio         PTT - ( 16 Oct 2019 08:15 )  PTT:47.6 sec  LIVER FUNCTIONS - ( 15 Oct 2019 04:05 )  Alb: 2.0 g/dL / Pro: 6.3 g/dL / ALK PHOS: 141 U/L / ALT: 35 U/L / AST: 63 U/L / GGT: x               Urinalysis Basic - ( 15 Oct 2019 01:19 )  Color: Pale Yellow / Appearance: Turbid / S.015 / pH: x  Gluc: x / Ketone: Negative  / Bili: Negative / Urobili: Negative   Blood: x / Protein: 150 mg/dL / Nitrite: Negative   Leuk Esterase: Moderate / RBC: 6-10 /HPF / WBC 11-25   Sq Epi: x / Non Sq Epi: Occasional / Bacteria: Many        WBC  TREND (5 Days)  WBC Count: 10.77 K/uL (10-16 @ 08:15)  WBC Count: 21.72 K/uL (10-15 @ 04:05)  WBC Count: 20.63 K/uL (10-14 @ 20:48)    HGB  TREND (5 Days)  Hemoglobin: 10.2 g/dL (10-16 @ 08:15)  Hemoglobin: 11.2 g/dL (10-15 @ 04:05)  Hemoglobin: 11.7 g/dL (10-14 @ 20:48)    HCT  TREND (5 Days)  Hematocrit: 30.8 % (10-16 @ 08:15)  Hematocrit: 33.3 % (10-15 @ 04:05)  Hematocrit: 34.7 % (10-14 @ 20:48)    PLT  TREND (5 Days)  Platelet Count - Automated: 288 K/uL (10-16 @ 08:15)  Platelet Count - Automated: 308 K/uL (10-15 @ 04:05)  Platelet Count - Automated: 328 K/uL (10-14 @ 20:48)      Culture - Urine (10.15.19 @ 09:11)    Specimen Source: .Urine Catheterized    Culture Results:   >100,000 CFU/ml Gram Negative Rods      Culture - Blood (10.14.19 @ 22:58)    Specimen Source: .Blood Blood-Peripheral    Culture Results:   No growth to date.      Culture - Blood (10.14.19 @ 22:58)    Specimen Source: .Blood Blood-Peripheral    Culture Results:   No growth to date.      Prostate Ca Screen, PSA Total (17 @ 00:59)    Prostate Ca Screen, PSA Total: 6.99: Serum PSA is not an absolute test for malignancy. The PSA value  should be used in conjunction with information available from  clinical and other diagnostic procedures.  METHOD: Roche EIA ng/mL      [RADIOLOGY & ADDITIONAL STUDIES:]    < from: Xray Chest 1 View AP/PA (10.14.19 @ 21:55) >  EXAM:  XR CHEST AP OR PA 1V                            PROCEDURE DATE:  10/14/2019          INTERPRETATION:  Clinical information: Fever.    Technique: Frontal view of the chest.    Comparison: Prior chest x-ray examination from 2017.    Findings: Small layering bilateral pleural effusions are noted. Adjacent   atelectasis and/or pneumonia is notable on the left. The heart size is at   the upper limits of normal. Multilevel degenerative changes are noted   within the imaged potions of the spine.    IMPRESSION: Small bilateral pleural effusions with adjacent atelectasis   and/or pneumonia on the left.    < end of copied text >      < from: CT Head No Cont (10.16.19 @ 13:38) >  EXAM:  CT BRAIN                            PROCEDURE DATE:  10/16/2019          INTERPRETATION:  .    CLINICAL INFORMATION: supratheraputic INR, AMS.    TECHNIQUE: Multiple axial CT images of the head were obtained without   contrast. Sagittal and coronal reconstructed images were acquired from   the source data.    COMPARISON: Prior head CT exam from 2016.    FINDINGS: There is no acute intracranial hemorrhage, mass effect, shift   of the midline structures, herniation, extra-axial fluid collection, or   hydrocephalus.    Chronic infarcts are again noted within the right frontal lobe and right   cerebellar hemisphere.    There is diffuse cerebral volume loss with prominence of the sulci,   fissures, and cisternal spaces which is normalfor the patient's age.   Moderate ventriculomegaly appears unchanged. There is moderate patchy   confluent deep and periventricular white matter hypoattenuation   statistically compatible with microvascular changes given calcific   atherosclerotic disease of the intracranial arteries.    The paranasal sinuses and mastoid air cells are clear. The calvarium is   intact. There is evidence of bilateral cataract removal.    IMPRESSION: No acute intracranial hemorrhage, mass effect, or shift of   the midline structures.    Similar-appearing chronic infarcts and microvascular disease as discussed.  < end of copied text >      < from: CT Abdomen and Pelvis No Cont (10.15.19 @ 05:21) >    EXAM:  CT ABDOMEN AND PELVIS                            PROCEDURE DATE:  10/15/2019          INTERPRETATION:  [facility] RADIOLOGIST PRELIMINARY REPORT - [facility] Radiologist   Dr. Alton Esparza    PROCEDURE INFORMATION:   Exam: CT Abdomen And Pelvis Without Contrast   Exam date and time: 10/15/2019 5:14 AM   Clinical history: 89 years old, male; Condition or disease; Other:   Pyuria, retention, ckd     TECHNIQUE:   Imaging protocol: Computed tomography of the abdomen and pelvis without   contrast.     COMPARISON:   CT ABDOMEN AND PELVIS 2017 7:47 AM     FINDINGS:   Pleural space: Small bilateral pleural effusions.   Heart: Cardiomegaly.     Liver: Normal. No mass.   Gallbladder and bile ducts: Multiple small calcified gallstones.   Pancreas: Pancreatic head 3.2 cm cystic lesion is unchanged compared to   2017.   Spleen: Normal. No splenomegaly.   Adrenals: Normal. No mass.   Kidneys and ureters: Many bilateral renal cysts most of which are simple   appearing. Minimal enlargement of an indeterminant 1.5 cm right kidney   lower pole partially exophytic cyst or solid nodule.   Stomach and bowel: Pancolonic diverticulosis is most pronounced in the   sigmoid colon.   Appendix: No evidence of appendicitis.   Intraperitoneal space: Unremarkable. No free air. No significant fluid   collection.   Vasculature: Coronary artery calcifications. Thoracic aortic   atherosclerotic calcifications. Moderate atherosclerotic calcifications   are present in the abdominal aorta. No aortic aneurysm.   Lymph nodes: Interval enlargement of a right common iliac lymph node   measures 1.7 x 2.1 cm. right perirectal nodule is unchanged.    Bladder: Diffuse urinary bladder wall thickening with adjacent stranding   suggests acute cystitis. Moderateright and mild left   hydroureteronephrosis, likely due to urinary bladder wall thickening.   Vega catheter in the urinary bladder. Right-sided partially exophytic   1.3 cm structure appears to be a diverticulum also on the prior.  Reproductive: Prostatomegaly.   Bones/joints: Left hip intramedullary isabella. No acute fracture or   dislocation. Degenerative changes are most pronounced in the lower lumbar   spine. Diffuse osteopenia.  Soft tissues: Unremarkable.     IMPRESSION: Limited examination secondary to exclusion of IV contrast.    1. Diffuse urinary bladder wall thickening with adjacent stranding   suggests acute cystitis. Underlying bladder neoplasm is not excluded.  2. Moderate right and mild left hydroureteronephrosis, likely due to   diffuse urinary bladder wall thickening.   3. Interval enlargement of a right common iliac lymph node measures 1.7 x   2.1 cm, either reactive to a pelvic inflammatory process or neoplastic.   4. Small bilateral pleural effusions.   5. Cholelithiasis.  6. Pancreatic head 3.2 cm cystic lesion is unchanged compared to   2017.   7. Minimal enlargement of an indeterminant 1.5 cm right kidney lower pole   partially exophytic cyst or solid nodule.    < end of copied text >

## 2019-10-16 NOTE — SWALLOW BEDSIDE ASSESSMENT ADULT - PHARYNGEAL PHASE
Decreased laryngeal elevation/Delayed pharyngeal swallow Delayed pharyngeal swallow/Decreased laryngeal elevation Delayed throat clear post oral intake/Delayed pharyngeal swallow/Multiple swallows/Decreased laryngeal elevation/Delayed cough post oral intake

## 2019-10-16 NOTE — DIETITIAN INITIAL EVALUATION ADULT. - PROBLEM SELECTOR PLAN 6
acute hyponatremia (127) likely 2/2 acute renal failure/hypovolemia  - Na 127, with altered mental status  - hold sertraline for now  - check urine electrolytes, urine and serum osmolality  - gentle IV fluids - no signs of overload at this time  - f/u AM BMP  - Nephology consult Dr. Escobedo

## 2019-10-16 NOTE — DIETITIAN INITIAL EVALUATION ADULT. - FACTORS AFF FOOD INTAKE
other (specify)/lack of appetite since admit diet per family soft at home was taking regular foods prior

## 2019-10-16 NOTE — DIETITIAN INITIAL EVALUATION ADULT. - PHYSICAL APPEARANCE
other (specify)/BMI 22.3 based on given ht and wt right buttock stage 1 coccyx/sacrum stage 1 appears thin and with mild malnutrition dx at this time.

## 2019-10-16 NOTE — PROGRESS NOTE ADULT - PROBLEM SELECTOR PLAN 6
acute hyponatremia (127) likely 2/2 acute renal failure/hypovolemia  - Na improved (139)  - hold sertraline for now  - Monitor bmp  - Nephology consult Dr. Sánchez

## 2019-10-16 NOTE — DIETITIAN INITIAL EVALUATION ADULT. - PROBLEM SELECTOR PLAN 8
chronic, stable  - continue metoprolol ER 50 mg BID with hold parameters  - on warfarin (recently decreased dose in setting of infection and kidney failure)   - f/u AM INR

## 2019-10-16 NOTE — DIETITIAN INITIAL EVALUATION ADULT. - PROBLEM SELECTOR PLAN 10
DVT ppx:  IMPROVE VTE Score: 4 patient on warfarin will not need prophylaxis at this time, dose coumadin per INR    Palliative care consult - wife states patient had hospice referral this week but now would like full code at this time, although states that pt would not want life support - will evaluate for DNR/DNI

## 2019-10-16 NOTE — PROGRESS NOTE ADULT - ASSESSMENT
89 white male with advanced dementia on hospice at home now admitted with possible sepsis with cystitis.  Rosas replaced.     Improving renal indices. Continue gentle IV hydration. Conservative management. D/w family at bedside. On IV abx.

## 2019-10-16 NOTE — SWALLOW BEDSIDE ASSESSMENT ADULT - SWALLOW EVAL: DIAGNOSIS
1- mild [facility], puree, honey thick liquid and nectar thick liquid marked by delayed bolus collection, transfer and transport. 2- moderate oral dysphagia given solids marked by prolonged mastication resulting in delayed oral preparation/AP transit with mild lingual residue remaining post swallow which pt is unable to clear given liquid wash. 3- moderate oral dysphagia given thin liquids marked by suspected posterior loss over base of tongue. 4- mild pharyngeal dysphagia given [facility] soft, puree, honey thick and nectar thick liquids marked by delayed swallow trigger and reduced hyolaryngeal excursion without any overt s/s of penetration/aspiration noted. 5- moderate-severe pharyngeal dysphagia given thin liquids marked by delayed swallow trigger and reduced hyolaryngeal excursion resulting in utilization of multiple swallows as well as weak, delayed reflexive throat clearing both suggestive of penetration/aspiration vs. pharyngeal stasis.

## 2019-10-16 NOTE — CONSULT NOTE ADULT - ASSESSMENT
89 y/o M with PMHx of systolic/diastolic CHF (EF 38%-60% in 4/2018), atrial fibrillation (on warfarin), HTN, HLD, BPH, bladder neoplasm, prostate cancer s/p radiation, depression, gout admitted with urinary retention, suspected UTI, hyponatremia, KAILEE on CKD IV and suspected pneumonia.    Acute on chronic combined systolic and diastolic congestive heart failure  - Pro-BNP 48,779 --> 49,323  - Biomarker trend consistent with volume overload  - Hold home furosemide 40mg every other day, 20mg every other day and KCl 8meq every other day in setting of acute renal failure.  - Previous echo 4/2018 shows EF 38%-60%    Atrial fibrillation  - Chronic, stable   - Continue metoprolol ER 50 mg BID with hold parameters  - On warfarin (recently decreased dose in setting of infection and kidney failure)     - BP well controlled, monitor routine hemodynamics.  - Monitor and replete lytes, keep K>4, Mg>2.  - Strict I/Os  - Other cardiovascular workup will depend on clinical course.  - All other workup per primary team.  - Will continue to follow. 89 y/o M with PMHx of systolic/diastolic CHF (EF 38%-60% in 4/2018), atrial fibrillation (on warfarin), HTN, HLD, BPH, bladder neoplasm, prostate cancer s/p radiation, depression, gout admitted with urinary retention, suspected UTI, hyponatremia, KAILEE on CKD IV and suspected pneumonia.    Carotid Doppler 2/19 - mild-moderate plaque   Stress Test: 12/15-Chem, no Ischemia, ?Inf-Apical MI, EF=42%   Echo: 4/18, mild MR, TR, PAP=46 LVEF 60%.     Acute on chronic combined systolic and diastolic congestive heart failure  - Pro-BNP 48,779 --> 49,323  - Biomarker trend consistent with volume overload  - Hold home furosemide 40mg every other day, 20mg every other day and KCl 8meq every other day in setting of acute renal failure.  - Previous echo 4/2018 shows EF 38%-60%    Atrial fibrillation  - Chronic, stable   - Continue metoprolol ER 50 mg BID with hold parameters  - On warfarin (recently decreased dose in setting of infection and kidney failure)     - BP well controlled, monitor routine hemodynamics.  - Monitor and replete lytes, keep K>4, Mg>2.  - Strict I/Os  - Other cardiovascular workup will depend on clinical course.  - All other workup per primary team.  - Will continue to follow. 89 y/o M with PMHx of systolic/diastolic CHF (EF 38%-60% in 4/2018), atrial fibrillation (on warfarin), HTN, HLD, BPH, bladder neoplasm, prostate cancer s/p radiation, depression, gout admitted with urinary retention, suspected UTI, hyponatremia, KAILEE on CKD IV and suspected pneumonia.    Carotid Doppler 2/19 - mild-moderate plaque   Stress Test: 12/15-Chem, no Ischemia, ?Inf-Apical MI, EF=42%   Echo: 4/18, mild MR, TR, PAP=46 LVEF 60%.     Acute on chronic combined systolic and diastolic congestive heart failure  - Pro-BNP 48,779 --> 49,323  - Biomarker trend consistent with volume overload  - Hold home furosemide 40mg every other day, 20mg every other day and KCl 8meq every other day in setting of acute renal failure.  - Previous echo 4/2018 shows EF 38%-60%  - Initial EKG 10/14 showed atrial fibrillation, 97 BPM, left anterior fascicular block. Repeat EKG 10/15, after patient had 7 beats r/o v-tach overnight, showed atrial fibrillation with premature or aberrantly conducted complexes.     Atrial fibrillation  - Chronic, stable   - Continue metoprolol ER 50 mg BID with hold parameters  - On warfarin (recently decreased dose in setting of infection and kidney failure)     - BP well controlled, monitor routine hemodynamics.  - Monitor and replete lytes, keep K>4, Mg>2.  - Strict I/Os  - Other cardiovascular workup will depend on clinical course.  - All other workup per primary team.  - Will continue to follow. 89 y/o M with PMHx of systolic/diastolic CHF (EF 38%-60% in 4/2018), atrial fibrillation (on warfarin), HTN, HLD, BPH, bladder neoplasm, prostate cancer s/p radiation, depression, gout admitted with urinary retention, suspected UTI, hyponatremia, KAILEE on CKD IV and suspected pneumonia.    Carotid Doppler 2/19 - mild-moderate plaque   Stress Test: 12/15-Chem, no Ischemia, ?Inf-Apical MI, EF=42%   Echo: 4/18, mild MR, TR, PAP=46 LVEF 60%.     Acute on chronic combined systolic and diastolic congestive heart failure  - Pro-BNP 48,779 --> 49,323  - Biomarker trend consistent with volume overload  - Hold home furosemide 40mg every other day, 20mg every other day and KCl 8meq every other day in setting of acute renal failure.  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 Repeat EKG 10/15, after patient had 7 beats r/o v-tach overnight, showed atrial fibrillation with premature or aberrantly conducted complexes.   - Continue metoprolol ER 50 mg BID with hold parameters  - On warfarin (recently decreased dose in setting of infection and kidney failure)     - Continue to monitor on telemetry   - BP well controlled, monitor routine hemodynamics.  - Monitor and replete lytes, keep K>4, Mg>2.  - Strict I/Os  - Other cardiovascular workup will depend on clinical course.  - All other workup per primary team.  - Will continue to follow.

## 2019-10-16 NOTE — DIETITIAN INITIAL EVALUATION ADULT. - PROBLEM SELECTOR PLAN 7
in the setting of recent antibiotic usage vs Legionella  - WBC 20, monitor stool burden, not suspicious of C diff at this time stool was loose, nonbloody, non-watery

## 2019-10-16 NOTE — CONSULT NOTE ADULT - PROBLEM SELECTOR PROBLEM 3
UTI (urinary tract infection)
Urinary tract infection associated with indwelling urethral catheter, initial encounter

## 2019-10-16 NOTE — DIETITIAN INITIAL EVALUATION ADULT. - ADD RECOMMEND
suggest change diet as above and provide food preferences, provide ensure enlive daily follow labs and PO intake may need to change to suplena supplement

## 2019-10-16 NOTE — CONSULT NOTE ADULT - SUBJECTIVE AND OBJECTIVE BOX
[facility]         Alejandro Hernandez, Sandip, Alvaro, Jasmina, Caden, Brannon        [phone] (office)    Reason for Consult: Atrial fibrillation, episodes of wide QRS    HPI:  89 y/o M with PMHx of systolic/diastolic CHF (EF 38%-60% in 4/2018), atrial fibrillation (on warfarin), HTN, HLD, BPH, bladder neoplasm, prostate cancer s/p radiation, depression, gout presented to ED due to cough and no urine output for >24 hours. Patient is with chronic vega, changed one week ago. Was started on macrobid as outpatient (diagnosed with UTI over the phone per wife), had failed antibiotic therapy prior to this. Patient became more weak and "delirious" 2 days prior to admission with several episodes of diarrhea.  In the ED, urine in Vega bag was noted to be grossly pyuric. Multiple attempts made by nursing and ER physician to replace vega - US bladder without visualization of catheter and patient anxious/in discomfort. History obtained by wife due to patient's dementia. States that patient has not seen urology Dr. Brownlee for a while now.  Per wife, patient has been having cloudy urine on and off for 3 months without any treatment.      In the ED, VS significant for Tmax 100.3 (rectal), . Labs significant for: WBC 20.63, Na 127, BUN/Cr 115/4.6. CXR showed small bilateral effusions, ?left infiltrate vs atelectasis. US bladder did not visualize vega catheter - showed mildly distended urinary bladder. EKG showed atrial fibrillation 97 with LAFB. (14 Oct 2019 23:41)      PAST MEDICAL & SURGICAL HISTORY:  Chronic kidney disease (CKD), stage IV (severe)  Prostate cancer: chronic vega  Hyperlipidemia  Hypertension  Atrial fibrillation  [place] (hard of hearing)  Bladder neoplasm  Congestive heart failure: hospitalized  once 12/2010  Acute congestive heart failure: 12/2010  Prostate cancer: 9/2011 - s/p external radiation treatments - last 12/9/11, s/p Bicalvutamide  Gout  Depression  Hypertension  Hyperlipidemia  Atrial Fibrillation: 1990  History of repair of hip fracture  Circumcision: age 5  History of Arthroscopy of Knee: left  S/P Cataract Extraction: LEFT  S/P Tonsillectomy      SOCIAL HISTORY: No active tobacco, alcohol or illicit drug use; Lives at home with wife    FAMILY HISTORY:  Family history of cancer    Home Medications:  furosemide 20 mg oral tablet: 1 tab(s) orally every other day (15 Oct 2019 03:01)  furosemide 40 mg oral tablet: 1 tab oral everyother day  (15 Oct 2019 03:01)  metoprolol succinate 50 mg oral tablet, extended release: 1 tab(s) orally 2 times a day (15 Oct 2019 03:01)  potassium chloride 8 mEq (600 mg) oral capsule, extended release: 1 tab(s) orally every other day (15 Oct 2019 03:01)  sertraline 25 mg oral tablet: 1 tab(s) orally once a day (15 Oct 2019 03:01)  Vitamin D3 400 intl units oral capsule: 1 cap(s) orally once a day (15 Oct 2019 03:01)  warfarin 2 mg oral tablet: 1 tab(s) orally once a day (15 Oct 2019 03:01)    MEDICATIONS  (STANDING):  azithromycin  IVPB 500 milliGRAM(s) IV Intermittent every 24 hours  cefTRIAXone   IVPB 1000 milliGRAM(s) IV Intermittent every 24 hours  cholecalciferol 400 Unit(s) Oral daily  influenza   Vaccine 0.5 milliLiter(s) IntraMuscular once  metoprolol succinate ER 50 milliGRAM(s) Oral two times a day    MEDICATIONS  (PRN):    Allergies    Hay Fever (Rhinorrhea)  No Known Drug Allergies    Intolerances    REVIEW OF SYSTEMS: Negative except as per HPI.    VITAL SIGNS:   Vital Signs Last 24 Hrs  T(C): 36.4 (16 Oct 2019 04:30), Max: 36.4 (15 Oct 2019 16:32)  T(F): 97.6 (16 Oct 2019 04:30), Max: 97.6 (16 Oct 2019 04:30)  HR: 84 (16 Oct 2019 04:30) (84 - 112)  BP: 118/73 (16 Oct 2019 04:30) (118/73 - 148/77)  BP(mean): --  RR: 19 (16 Oct 2019 04:30) (18 - 19)  SpO2: 99% (16 Oct 2019 04:30) (96% - 99%)    I&O's Summary    15 Oct 2019 07:01  -  16 Oct 2019 07:00  --------------------------------------------------------  IN: 0 mL / OUT: 1850 mL / NET: -1850 mL    PHYSICAL EXAM:  Constitutional: NAD, well-developed, frail  HEENT NC/AT, moist mucous membranes  Pulmonary: Non-labored, breath sounds are clear bilaterally, no wheezing, rales or rhonchi  Cardiovascular: +S1, S2, irregular, no murmur  Gastrointestinal: Soft, nontender, nondistended, normoactive bowel sounds  Extremities: No peripheral edema, + peripheral pulses   Neurological: AAOx1    LABS: All Labs Reviewed:                        11.2   21.72 )-----------( 308      ( 15 Oct 2019 04:05 )             33.3                         11.7   20.63 )-----------( 328      ( 14 Oct 2019 20:48 )             34.7     15 Oct 2019 22:24    137    |  111    |  94     ----------------------------<  77     4.4     |  15     |  3.40   15 Oct 2019 04:05    131    |  102    |  105    ----------------------------<  114    5.0     |  16     |  4.10   14 Oct 2019 20:48    127    |  97     |  115    ----------------------------<  121    5.3     |  19     |  4.60     Ca    8.5        15 Oct 2019 22:24  Ca    8.2        15 Oct 2019 04:05  Ca    8.5        14 Oct 2019 20:48  Phos  5.5       15 Oct 2019 22:24  Phos  5.4       15 Oct 2019 04:05  Mg     2.2       15 Oct 2019 22:24  Mg     2.1       15 Oct 2019 04:05    TPro  6.3    /  Alb  2.0    /  TBili  0.3    /  DBili  x      /  AST  63     /  ALT  35     /  AlkPhos  141    15 Oct 2019 04:05  TPro  6.5    /  Alb  2.1    /  TBili  0.3    /  DBili  x      /  AST  21     /  ALT  17     /  AlkPhos  131    14 Oct 2019 20:48    PT/INR - ( 15 Oct 2019 07:48 )   PT: 63.5 sec;   INR: 5.34 ratio         PTT - ( 15 Oct 2019 07:48 )  PTT:47.6 sec    Blood Culture: Organism --  Gram Stain Blood -- Gram Stain --  Specimen Source .Blood Blood-Peripheral  Culture-Blood --      10-15 @ 04:17  Pro Bnp [id number]  10-15 @ 04:05  Pro Bnp [id number]    EKG:    RADIOLOGY:  EXAM:  CT ABDOMEN AND PELVIS                            PROCEDURE DATE:  10/15/2019          INTERPRETATION:  [facility] RADIOLOGIST PRELIMINARY REPORT - [facility] Radiologist   Dr. Alton Esparza    PROCEDURE INFORMATION:   Exam: CT Abdomen And Pelvis Without Contrast   Exam date and time: 10/15/2019 5:14 AM   Clinical history: 89 years old, male; Condition or disease; Other:   Pyuria, retention, ckd     TECHNIQUE:   Imaging protocol: Computed tomography of the abdomen and pelvis without   contrast.     COMPARISON:   CT ABDOMEN AND PELVIS 9/12/2017 7:47 AM     FINDINGS:   Pleural space: Small bilateral pleural effusions.   Heart: Cardiomegaly.     Liver: Normal. No mass.   Gallbladder and bile ducts: Multiple small calcified gallstones.   Pancreas: Pancreatic head 3.2 cm cystic lesion is unchanged compared to   09/12/2017.   Spleen: Normal. No splenomegaly.   Adrenals: Normal. No mass.   Kidneys and ureters: Many bilateral renal cysts most of which are simple   appearing. Minimal enlargement of an indeterminant 1.5 cm right kidney   lower pole partially exophytic cyst or solid nodule.   Stomach and bowel: Pancolonic diverticulosis is most pronounced in the   sigmoid colon.   Appendix: No evidence of appendicitis.   Intraperitoneal space: Unremarkable. No free air. No significant fluid   collection.   Vasculature: Coronary artery calcifications. Thoracic aortic   atherosclerotic calcifications. Moderate atherosclerotic calcifications   are present in the abdominal aorta. No aortic aneurysm.   Lymph nodes: Interval enlargement of a right common iliac lymph node   measures 1.7 x 2.1 cm. right perirectal nodule is unchanged.    Bladder: Diffuse urinary bladder wall thickening with adjacent stranding   suggests acute cystitis. Moderateright and mild left   hydroureteronephrosis, likely due to urinary bladder wall thickening.   Vega catheter in the urinary bladder. Right-sided partially exophytic   1.3 cm structure appears to be a diverticulum also on the prior.  Reproductive: Prostatomegaly.   Bones/joints: Left hip intramedullary isabella. No acute fracture or   dislocation. Degenerative changes are most pronounced in the lower lumbar   spine. Diffuse osteopenia.  Soft tissues: Unremarkable.     IMPRESSION: Limited examination secondary to exclusion of IV contrast.    1. Diffuse urinary bladder wall thickening with adjacent stranding   suggests acute cystitis. Underlying bladder neoplasm is not excluded.  2. Moderate right and mild left hydroureteronephrosis, likely due to   diffuse urinary bladder wall thickening.   3. Interval enlargement of a right common iliac lymph node measures 1.7 x   2.1 cm, either reactive to a pelvic inflammatory process or neoplastic.   4. Small bilateral pleural effusions.   5. Cholelithiasis.  6. Pancreatic head 3.2 cm cystic lesion is unchanged compared to   09/12/2017.   7. Minimal enlargement of an indeterminant 1.5 cm right kidney lower pole   partially exophytic cyst or solid nodule.    EXAM:  US URINARY BLADDER                          PROCEDURE DATE:  10/14/2019      INTERPRETATION:  CLINICAL INFORMATION:  check vega placement    TECHNIQUE:  Bladder sonogram is performed.    COMPARISON:  CT the abdomen and pelvis from 9/12/2017.    FINDINGS:      Bladder mildly distended with thickening and trabeculations of the walls   with multiple bladder diverticula. Both ureteral jets are visualized. A   Vega catheter is not visualized.    IMPRESSION:    Vega catheter not visualized. Bladder mildly distended with thickening   and trabecular of the walls with multiple bladder diverticula.      CXR:  EXAM:  XR CHEST AP OR PA 1V                          PROCEDURE DATE:  10/14/2019      INTERPRETATION:  Clinical information: Fever.    Technique: Frontal view of the chest.    Comparison: Prior chest x-ray examination from 9/8/2017.    Findings: Small layering bilateral pleural effusions are noted. Adjacent   atelectasis and/or pneumonia is notable on the left. The heart size is at   the upper limits of normal. Multilevel degenerative changes are noted   within the imaged potions of the spine.    IMPRESSION: Small bilateral pleural effusions with adjacent atelectasis   and/or pneumonia on the left. [facility]         Alejandro Hernandez, Sandip, Alvaro, Jasmina, Caden, Brannon        [phone] (office)    Reason for Consult: Chronic atrial fibrillation, episodes of wide QRS complex    Interval HPI: Patient seen and examined at bedside. Patient is AAOx1. Per nurse, overnight, around 9 PM, patient had 7 beats of r/o v-tach at 113 beats/min. Yesterday, per chart review, remote telemetry reported that patient had 4-5 beats wide QRS complex; vitals were stable and patient was in no distress.    HPI:  89 y/o M with PMHx of systolic/diastolic CHF (EF 38%-60% in 4/2018), atrial fibrillation (on warfarin), HTN, HLD, BPH, bladder neoplasm, prostate cancer s/p radiation, depression, gout presented to ED due to cough and no urine output for >24 hours. Patient is with chronic vega, changed one week ago. Was started on macrobid as outpatient (diagnosed with UTI over the phone per wife), had failed antibiotic therapy prior to this. Patient became more weak and "delirious" 2 days prior to admission with several episodes of diarrhea.  In the ED, urine in Vega bag was noted to be grossly pyuric. Multiple attempts made by nursing and ER physician to replace vega - US bladder without visualization of catheter and patient anxious/in discomfort. History obtained by wife due to patient's dementia. States that patient has not seen urology Dr. Brownlee for a while now.  Per wife, patient has been having cloudy urine on and off for 3 months without any treatment.      In the ED, VS significant for Tmax 100.3 (rectal), . Labs significant for: WBC 20.63, Na 127, BUN/Cr 115/4.6. CXR showed small bilateral effusions, ?left infiltrate vs atelectasis. US bladder did not visualize vega catheter - showed mildly distended urinary bladder. EKG showed atrial fibrillation 97 with LAFB. (14 Oct 2019 23:41)      PAST MEDICAL & SURGICAL HISTORY:  Chronic kidney disease (CKD), stage IV (severe)  Prostate cancer: chronic vega  Hyperlipidemia  Hypertension  Atrial fibrillation  [place] (hard of hearing)  Bladder neoplasm  Congestive heart failure: hospitalized  once 12/2010  Acute congestive heart failure: 12/2010  Prostate cancer: 9/2011 - s/p external radiation treatments - last 12/9/11, s/p Bicalvutamide  Gout  Depression  Hypertension  Hyperlipidemia  Atrial Fibrillation: 1990  History of repair of hip fracture  Circumcision: age 5  History of Arthroscopy of Knee: left  S/P Cataract Extraction: LEFT  S/P Tonsillectomy      SOCIAL HISTORY: No active tobacco, alcohol or illicit drug use; Lives at home with wife    FAMILY HISTORY:  Family history of cancer    Home Medications:  furosemide 20 mg oral tablet: 1 tab(s) orally every other day (15 Oct 2019 03:01)  furosemide 40 mg oral tablet: 1 tab oral everyother day  (15 Oct 2019 03:01)  metoprolol succinate 50 mg oral tablet, extended release: 1 tab(s) orally 2 times a day (15 Oct 2019 03:01)  potassium chloride 8 mEq (600 mg) oral capsule, extended release: 1 tab(s) orally every other day (15 Oct 2019 03:01)  sertraline 25 mg oral tablet: 1 tab(s) orally once a day (15 Oct 2019 03:01)  Vitamin D3 400 intl units oral capsule: 1 cap(s) orally once a day (15 Oct 2019 03:01)  warfarin 2 mg oral tablet: 1 tab(s) orally once a day (15 Oct 2019 03:01)    MEDICATIONS  (STANDING):  azithromycin  IVPB 500 milliGRAM(s) IV Intermittent every 24 hours  cefTRIAXone   IVPB 1000 milliGRAM(s) IV Intermittent every 24 hours  cholecalciferol 400 Unit(s) Oral daily  influenza   Vaccine 0.5 milliLiter(s) IntraMuscular once  metoprolol succinate ER 50 milliGRAM(s) Oral two times a day    MEDICATIONS  (PRN):    Allergies    Hay Fever (Rhinorrhea)  No Known Drug Allergies    Intolerances    REVIEW OF SYSTEMS: Negative except as per HPI.    VITAL SIGNS:   Vital Signs Last 24 Hrs  T(C): 36.4 (16 Oct 2019 04:30), Max: 36.4 (15 Oct 2019 16:32)  T(F): 97.6 (16 Oct 2019 04:30), Max: 97.6 (16 Oct 2019 04:30)  HR: 84 (16 Oct 2019 04:30) (84 - 112)  BP: 118/73 (16 Oct 2019 04:30) (118/73 - 148/77)  BP(mean): --  RR: 19 (16 Oct 2019 04:30) (18 - 19)  SpO2: 99% (16 Oct 2019 04:30) (96% - 99%)    I&O's Summary    15 Oct 2019 07:01  -  16 Oct 2019 07:00  --------------------------------------------------------  IN: 0 mL / OUT: 1850 mL / NET: -1850 mL    PHYSICAL EXAM:  Constitutional: NAD, well-developed, frail  HEENT NC/AT, moist mucous membranes  Pulmonary: Non-labored, breath sounds are clear bilaterally, no wheezing, rales or rhonchi  Cardiovascular: +S1, S2, irregular, no murmur  Gastrointestinal: Soft, nontender, nondistended, normoactive bowel sounds  Extremities: No peripheral edema, + peripheral pulses   Neurological: AAOx1    LABS: All Labs Reviewed:                        11.2   21.72 )-----------( 308      ( 15 Oct 2019 04:05 )             33.3                         11.7   20.63 )-----------( 328      ( 14 Oct 2019 20:48 )             34.7     15 Oct 2019 22:24    137    |  111    |  94     ----------------------------<  77     4.4     |  15     |  3.40   15 Oct 2019 04:05    131    |  102    |  105    ----------------------------<  114    5.0     |  16     |  4.10   14 Oct 2019 20:48    127    |  97     |  115    ----------------------------<  121    5.3     |  19     |  4.60     Ca    8.5        15 Oct 2019 22:24  Ca    8.2        15 Oct 2019 04:05  Ca    8.5        14 Oct 2019 20:48  Phos  5.5       15 Oct 2019 22:24  Phos  5.4       15 Oct 2019 04:05  Mg     2.2       15 Oct 2019 22:24  Mg     2.1       15 Oct 2019 04:05    TPro  6.3    /  Alb  2.0    /  TBili  0.3    /  DBili  x      /  AST  63     /  ALT  35     /  AlkPhos  141    15 Oct 2019 04:05  TPro  6.5    /  Alb  2.1    /  TBili  0.3    /  DBili  x      /  AST  21     /  ALT  17     /  AlkPhos  131    14 Oct 2019 20:48    PT/INR - ( 15 Oct 2019 07:48 )   PT: 63.5 sec;   INR: 5.34 ratio         PTT - ( 15 Oct 2019 07:48 )  PTT:47.6 sec    Blood Culture: Organism --  Gram Stain Blood -- Gram Stain --  Specimen Source .Blood Blood-Peripheral  Culture-Blood --      10-15 @ 04:17  Pro Bnp [id number]  10-15 @ 04:05  Pro Bnp [id number]    EKG:    RADIOLOGY:  EXAM:  CT ABDOMEN AND PELVIS                            PROCEDURE DATE:  10/15/2019          INTERPRETATION:  [facility] RADIOLOGIST PRELIMINARY REPORT - [facility] Radiologist   Dr. Alton Esparza    PROCEDURE INFORMATION:   Exam: CT Abdomen And Pelvis Without Contrast   Exam date and time: 10/15/2019 5:14 AM   Clinical history: 89 years old, male; Condition or disease; Other:   Pyuria, retention, ckd     TECHNIQUE:   Imaging protocol: Computed tomography of the abdomen and pelvis without   contrast.     COMPARISON:   CT ABDOMEN AND PELVIS 9/12/2017 7:47 AM     FINDINGS:   Pleural space: Small bilateral pleural effusions.   Heart: Cardiomegaly.     Liver: Normal. No mass.   Gallbladder and bile ducts: Multiple small calcified gallstones.   Pancreas: Pancreatic head 3.2 cm cystic lesion is unchanged compared to   09/12/2017.   Spleen: Normal. No splenomegaly.   Adrenals: Normal. No mass.   Kidneys and ureters: Many bilateral renal cysts most of which are simple   appearing. Minimal enlargement of an indeterminant 1.5 cm right kidney   lower pole partially exophytic cyst or solid nodule.   Stomach and bowel: Pancolonic diverticulosis is most pronounced in the   sigmoid colon.   Appendix: No evidence of appendicitis.   Intraperitoneal space: Unremarkable. No free air. No significant fluid   collection.   Vasculature: Coronary artery calcifications. Thoracic aortic   atherosclerotic calcifications. Moderate atherosclerotic calcifications   are present in the abdominal aorta. No aortic aneurysm.   Lymph nodes: Interval enlargement of a right common iliac lymph node   measures 1.7 x 2.1 cm. right perirectal nodule is unchanged.    Bladder: Diffuse urinary bladder wall thickening with adjacent stranding   suggests acute cystitis. Moderateright and mild left   hydroureteronephrosis, likely due to urinary bladder wall thickening.   Vega catheter in the urinary bladder. Right-sided partially exophytic   1.3 cm structure appears to be a diverticulum also on the prior.  Reproductive: Prostatomegaly.   Bones/joints: Left hip intramedullary isabella. No acute fracture or   dislocation. Degenerative changes are most pronounced in the lower lumbar   spine. Diffuse osteopenia.  Soft tissues: Unremarkable.     IMPRESSION: Limited examination secondary to exclusion of IV contrast.    1. Diffuse urinary bladder wall thickening with adjacent stranding   suggests acute cystitis. Underlying bladder neoplasm is not excluded.  2. Moderate right and mild left hydroureteronephrosis, likely due to   diffuse urinary bladder wall thickening.   3. Interval enlargement of a right common iliac lymph node measures 1.7 x   2.1 cm, either reactive to a pelvic inflammatory process or neoplastic.   4. Small bilateral pleural effusions.   5. Cholelithiasis.  6. Pancreatic head 3.2 cm cystic lesion is unchanged compared to   09/12/2017.   7. Minimal enlargement of an indeterminant 1.5 cm right kidney lower pole   partially exophytic cyst or solid nodule.    EXAM:  US URINARY BLADDER                          PROCEDURE DATE:  10/14/2019      INTERPRETATION:  CLINICAL INFORMATION:  check vega placement    TECHNIQUE:  Bladder sonogram is performed.    COMPARISON:  CT the abdomen and pelvis from 9/12/2017.    FINDINGS:      Bladder mildly distended with thickening and trabeculations of the walls   with multiple bladder diverticula. Both ureteral jets are visualized. A   Vega catheter is not visualized.    IMPRESSION:    Vega catheter not visualized. Bladder mildly distended with thickening   and trabecular of the walls with multiple bladder diverticula.      CXR:  EXAM:  XR CHEST AP OR PA 1V                          PROCEDURE DATE:  10/14/2019      INTERPRETATION:  Clinical information: Fever.    Technique: Frontal view of the chest.    Comparison: Prior chest x-ray examination from 9/8/2017.    Findings: Small layering bilateral pleural effusions are noted. Adjacent   atelectasis and/or pneumonia is notable on the left. The heart size is at   the upper limits of normal. Multilevel degenerative changes are noted   within the imaged potions of the spine.    IMPRESSION: Small bilateral pleural effusions with adjacent atelectasis   and/or pneumonia on the left.

## 2019-10-16 NOTE — DIETITIAN INITIAL EVALUATION ADULT. - OTHER INFO
patient seen with history dementia. family at bedside. patient at home with hospice. chronic vega with sepsis and cystitis and PN. no edema no food allergies per family. takes ensure chocolate flavor at home. no noted recent weight change and weigh noted from 2016 admit approximately same as given wt. nutrition focused physical exam deferred at this time patient sleeping but evident moderate temple muscle loss as well as clavicle and shoulder moderate muscle loss and mild orbital fat loss. patient with mild acute malnutrition with possible < 50-75%  of meals taken as well as noted fat and muscle mass loss.

## 2019-10-16 NOTE — SWALLOW BEDSIDE ASSESSMENT ADULT - SWALLOW EVAL: RECOMMENDED FEEDING/EATING TECHNIQUES
no straws/small sips/bites/oral hygiene/crush medication (when feasible)/maintain upright posture during/after eating for 30 mins/allow for swallow between intakes/check mouth frequently for oral residue/pocketing/alternate food with liquid/hard swallow w/ each bite or sip/position upright (90 degrees)

## 2019-10-16 NOTE — SWALLOW BEDSIDE ASSESSMENT ADULT - ORAL PHASE
Delayed oral transit time/Decreased anterior-posterior movement of the bolus Decreased anterior-posterior movement of the bolus/Lingual stasis/unable to clear stasis given liquid wash/Delayed oral transit time Delayed oral transit time/Decreased anterior-posterior movement of the bolus/suspected posterior loss over base of tongue

## 2019-10-16 NOTE — DIETITIAN INITIAL EVALUATION ADULT. - PROBLEM SELECTOR PLAN 1
chronic vega with pyuria   - WBC 20  - no urine output for 24hr prior to admission noted to have pyuria in vega bag  - Urology consult: Dr. Brownlee in the ED placed vega drained >400cc pus, slightly pink  - f/u CT Abd/pelvis to r/o ascending infection  - continue IV empiric antibiotic coverage with ceftriaxone f/u urine culture

## 2019-10-16 NOTE — DIETITIAN INITIAL EVALUATION ADULT. - PROBLEM SELECTOR PLAN 2
KAILEE on CKD 4 (wife reports baseline Cr of ~2.6)  likely 2/2 outlet obstruction vs infectious vs dehydration  - BUN/Cr 115/4.6 - received 2L in ED  - f/u CT Abd/pelvis   - continue IV antibiotic coverage ceftriaxone f/u urine culture for sensitives  - gentle IV fluids for now, monitor volume status closely  - monitor renal indices, avoid nephrotoxic agents, strict intake and output  - nephrology consult Dr. Sánchez

## 2019-10-16 NOTE — CONSULT NOTE ADULT - PROBLEM SELECTOR RECOMMENDATION 9
pt is a weak elderly male - with a lengthy list of comorbidities -   pt is DNR DNI  pt was on Home [facility] - with [facility] -   oral and skin care  assist with ADL  fall prec  dysphagia  on emp ABX for ACUTE INFECTION - eval in progress -   discussed care and concerns with family at the bedside  imaging and labs reviewed
18 Fr vega inserted for white thick urine

## 2019-10-16 NOTE — CONSULT NOTE ADULT - ASSESSMENT
[ASSESSMENT and  PLAN]  coagulopathy due to coumadin, with higher INR likely oleary to combination of decreased PO intake, antibiotics for outpt tx of UTI.   No bleeding.   However continues to rise. Continues on abx and PO intake still poor.   Will give small dose VitK.     UTI with gram negative rods  Urinary retention.   KAILEE  CKD stage 4    systolic CHF, with acute on chronic combined systolic and diastolic congestive heart failure  - Pro-BNP 48,779 --> 49,323  exacerbated by urinary retention [essentially mechanical inability to diurese] and worsening renal function    chronic afib on coumadin.   LAFB  runs of Vtach     Anemia due to chronic disease  anemia due to CKD    Leukocytosis due to infection, UTI.     RECOMMENDATIONS  Transfuse PRBC as clinically indicated.   Transfuse PRBC if Hgb <7.0 or if symptomatic.   Follow CBC    Check Anemia outpt if warranted.     Diuresis per cardiolgy and renal.  Rate mgmt on metoprolol.      Mgmt KAILEE per renal and urology.   Rosas in place.     Abx for UTI per medicine/ urology.   Abx for pneumonia per medicine.   on Rocephin and Zithromax.   Flu shot ordered for this admission, before DC    DVT Prophyalxis: supratherapeutic INR.   No bleeding. However INR continues to rise.   Give single dose VitK 2.5mg PO.   Given age, CKD, at increased risk for bleeding.   Follow INR daily.     Thank you for consulting us. [ASSESSMENT and  PLAN]  coagulopathy due to coumadin, with higher INR likely oleary to combination of decreased PO intake, antibiotics for outpt tx of UTI.   No bleeding. However INR continues to rise. Continues on abx and PO intake still poor. Given age, CKD, CHF, abn LFT, infection, at increased risk for bleeding.   Also hx of chornic brain infarcts.   Will give small dose VitK.     UTI with gram negative rods  Urinary retention.   KAILEE  CKD stage 4    systolic CHF, with acute on chronic combined systolic and diastolic congestive heart failure  - Pro-BNP 48,779 --> 49,323  exacerbated by urinary retention [essentially mechanical inability to diurese] and worsening renal function    chronic afib on coumadin.   LAFB  runs of Vtach     Anemia due to chronic disease  anemia due to CKD    Leukocytosis due to infection, UTI.     hx prostate cancer, s/p XRT in past. PANCHO per urology.   Diffuse urinary bladder wall thickening with adjacent stranding   suggests acute cystitis. Underlying bladder neoplasm is not excluded.  Moderate right and mild left hydroureteronephrosis, likely due to   diffuse urinary bladder wall thickening. Interval enlargement of a right common iliac lymph node measures 1.7 x 2.1 cm, either reactive to a pelvic inflammatory process or neoplastic.     Incidental Cholelithiasis.  Incidental Pancreatic head 3.2 cm cystic lesion is unchanged compared to 09/12/2017.   Incidental Minimal enlargement of an indeterminant 1.5 cm right kidney lower pole   partially exophytic cyst or solid nodule.      RECOMMENDATIONS  Transfuse PRBC as clinically indicated.   Transfuse PRBC if Hgb <7.0 or if symptomatic.   Follow CBC    Check Anemia outpt if warranted.     Diuresis per cardiolgy and renal.  Rate mgmt on metoprolol.      Mgmt KAILEE per renal and urology.   Rosas in place.     Abx for UTI per medicine/ urology.   Abx for pneumonia per medicine.   on Rocephin and Zithromax.   Flu shot ordered for this admission, before DC    DVT Prophylaxis supra-therapeutic INR.     Give single dose VitK 2.5mg PO.   Follow INR daily.     oupt followup with urology for urinary retention, PSA / prostate cancer, renal lesion re-eval.     Palliative care consult appropriate given advanced age and multiple comorbidities.       Thank you for consulting us.

## 2019-10-17 LAB
-  AMIKACIN: SIGNIFICANT CHANGE UP
-  AZTREONAM: SIGNIFICANT CHANGE UP
-  CEFEPIME: SIGNIFICANT CHANGE UP
-  CEFTAZIDIME: SIGNIFICANT CHANGE UP
-  CIPROFLOXACIN: SIGNIFICANT CHANGE UP
-  GENTAMICIN: SIGNIFICANT CHANGE UP
-  IMIPENEM: SIGNIFICANT CHANGE UP
-  LEVOFLOXACIN: SIGNIFICANT CHANGE UP
-  MEROPENEM: SIGNIFICANT CHANGE UP
-  PIPERACILLIN/TAZOBACTAM: SIGNIFICANT CHANGE UP
-  TOBRAMYCIN: SIGNIFICANT CHANGE UP
ANION GAP SERPL CALC-SCNC: 10 MMOL/L — SIGNIFICANT CHANGE UP (ref 5–17)
APTT BLD: 45.2 SEC — HIGH (ref 28.5–37)
BUN SERPL-MCNC: 78 MG/DL — HIGH (ref 7–23)
CALCIUM SERPL-MCNC: 8.8 MG/DL — SIGNIFICANT CHANGE UP (ref 8.5–10.1)
CHLORIDE SERPL-SCNC: 110 MMOL/L — HIGH (ref 96–108)
CO2 SERPL-SCNC: 19 MMOL/L — LOW (ref 22–31)
CREAT SERPL-MCNC: 2.8 MG/DL — HIGH (ref 0.5–1.3)
CULTURE RESULTS: SIGNIFICANT CHANGE UP
GLUCOSE SERPL-MCNC: 89 MG/DL — SIGNIFICANT CHANGE UP (ref 70–99)
HCT VFR BLD CALC: 34.6 % — LOW (ref 39–50)
HGB BLD-MCNC: 11.5 G/DL — LOW (ref 13–17)
INR BLD: 4.24 RATIO — HIGH (ref 0.88–1.16)
MCHC RBC-ENTMCNC: 31.2 PG — SIGNIFICANT CHANGE UP (ref 27–34)
MCHC RBC-ENTMCNC: 33.2 GM/DL — SIGNIFICANT CHANGE UP (ref 32–36)
MCV RBC AUTO: 93.8 FL — SIGNIFICANT CHANGE UP (ref 80–100)
METHOD TYPE: SIGNIFICANT CHANGE UP
NRBC # BLD: 0 /100 WBCS — SIGNIFICANT CHANGE UP (ref 0–0)
ORGANISM # SPEC MICROSCOPIC CNT: SIGNIFICANT CHANGE UP
ORGANISM # SPEC MICROSCOPIC CNT: SIGNIFICANT CHANGE UP
PLATELET # BLD AUTO: 343 K/UL — SIGNIFICANT CHANGE UP (ref 150–400)
POTASSIUM SERPL-MCNC: 3.8 MMOL/L — SIGNIFICANT CHANGE UP (ref 3.5–5.3)
POTASSIUM SERPL-SCNC: 3.8 MMOL/L — SIGNIFICANT CHANGE UP (ref 3.5–5.3)
PROTHROM AB SERPL-ACNC: 50.1 SEC — HIGH (ref 10–12.9)
RBC # BLD: 3.69 M/UL — LOW (ref 4.2–5.8)
RBC # FLD: 14.2 % — SIGNIFICANT CHANGE UP (ref 10.3–14.5)
SODIUM SERPL-SCNC: 139 MMOL/L — SIGNIFICANT CHANGE UP (ref 135–145)
SPECIMEN SOURCE: SIGNIFICANT CHANGE UP
WBC # BLD: 7.97 K/UL — SIGNIFICANT CHANGE UP (ref 3.8–10.5)
WBC # FLD AUTO: 7.97 K/UL — SIGNIFICANT CHANGE UP (ref 3.8–10.5)

## 2019-10-17 PROCEDURE — 99232 SBSQ HOSP IP/OBS MODERATE 35: CPT

## 2019-10-17 PROCEDURE — 99233 SBSQ HOSP IP/OBS HIGH 50: CPT | Mod: GC

## 2019-10-17 RX ORDER — MORPHINE SULFATE 50 MG/1
5 CAPSULE, EXTENDED RELEASE ORAL
Refills: 0 | Status: DISCONTINUED | OUTPATIENT
Start: 2019-10-17 | End: 2019-10-19

## 2019-10-17 RX ADMIN — AZITHROMYCIN 255 MILLIGRAM(S): 500 TABLET, FILM COATED ORAL at 22:40

## 2019-10-17 RX ADMIN — CEFTRIAXONE 100 MILLIGRAM(S): 500 INJECTION, POWDER, FOR SOLUTION INTRAMUSCULAR; INTRAVENOUS at 22:37

## 2019-10-17 RX ADMIN — SODIUM CHLORIDE 50 MILLILITER(S): 9 INJECTION, SOLUTION INTRAVENOUS at 06:02

## 2019-10-17 RX ADMIN — Medication 50 MILLIGRAM(S): at 18:28

## 2019-10-17 RX ADMIN — Medication 50 MILLIGRAM(S): at 06:03

## 2019-10-17 RX ADMIN — Medication 400 UNIT(S): at 11:45

## 2019-10-17 NOTE — PROGRESS NOTE ADULT - ASSESSMENT
[ASSESSMENT and  PLAN]  coagulopathy due to coumadin, with higher INR likely oleary to combination of decreased PO intake, antibiotics for outpt tx of UTI.   No bleeding. However INR continues to rise. Continues on abx and PO intake still poor. Given age, CKD, CHF, abn LFT, infection, at increased risk for bleeding.   Also hx of chornic brain infarcts.    INR 4.9==>5.34==>6.68 ==>s/p 2.5mg  VitK 10/16/19==>INR 4.24.,    UTI with gram negative rods  Urinary retention. Pyuria.   KAILEE  CKD stage 4    systolic CHF, with acute on chronic combined systolic and diastolic congestive heart failure  - Pro-BNP 48,779 --> 49,323  exacerbated by urinary retention [essentially mechanical inability to diurese] and worsening renal function    chronic afib on coumadin.   LAFB  runs of Vtach     Anemia due to chronic disease  anemia due to CKD    Leukocytosis due to infection, UTI.     hx prostate cancer, s/p XRT in past. PANCHO per urology.   Diffuse urinary bladder wall thickening with adjacent stranding   suggests acute cystitis. Underlying bladder neoplasm is not excluded.  Moderate right and mild left hydroureteronephrosis, likely due to   diffuse urinary bladder wall thickening. Interval enlargement of a right common iliac lymph node measures 1.7 x 2.1 cm, either reactive to a pelvic inflammatory process or neoplastic.     Incidental Cholelithiasis.  Incidental Pancreatic head 3.2 cm cystic lesion is unchanged compared to 09/12/2017.   Incidental Minimal enlargement of an indeterminant 1.5 cm right kidney lower pole   partially exophytic cyst or solid nodule.      RECOMMENDATIONS  Transfuse PRBC as clinically indicated.   Transfuse PRBC if Hgb <7.0 or if symptomatic.   Follow CBC    Check Anemia outpt.     Diuresis per cardiolgy and renal.  Rate mgmt on metoprolol.      Mgmt KAILEE per renal and urology.   Rosas in place.     Abx for UTI per medicine/ urology.   Abx for pneumonia per medicine.   on Rocephin and Zithromax.   Flu shot ordered for this admission, before DC    DVT Prophylaxis supra-therapeutic INR.   INR improving.   s/p single dose VitK 2.5mg PO 10/16/19.   Hold further VitK for now.   Encourage PO food.   Follow INR daily.     oupt followup with urology for urinary retention, PSA / prostate cancer, renal lesion re-eval.     Palliative care consult appropriate given advanced age and multiple comorbidities.         Thank you for consulting us.   No additional recommendations at current time.   Will sign off on case for now.   Please call, or re-consult if needed.

## 2019-10-17 NOTE — PROGRESS NOTE ADULT - PROBLEM SELECTOR PROBLEM 10
Need for prophylactic measure
Anemia, chronic disease
Need for prophylactic measure
Need for prophylactic measure

## 2019-10-17 NOTE — PROGRESS NOTE ADULT - PROBLEM SELECTOR PLAN 9
chronic, stable  - holding home sertraline in setting of hyponatremia

## 2019-10-17 NOTE — PROGRESS NOTE ADULT - ASSESSMENT
89 y/o M with PMHx of NICM now with improved ef, diastolic CHF , atrial fibrillation (on warfarin), HTN, HLD, BPH, bladder neoplasm, prostate cancer s/p radiation, depression, gout, on home hospice now  admitted with urinary retention, suspected UTI, hyponatremia, KAILEE on CKD IV and suspected pneumonia.    Carotid Doppler 2/19 - mild-moderate plaque   Stress Test: 12/15-Chem, no Ischemia, ?Inf-Apical MI, EF=42%   Echo: 4/18, mild MR, TR, PAP=46 LVEF 60%.     diastolic congestive heart failure  Does not appear volume overloaded on exam, diuretic held on admission for KAILEE - follow up renal recommendations   Continue beta blocker  no anginal complaints   no need to repeat 2decho - palliative notes reviewed on hospice pending , conservative management     Urology UTI  Management as per urology primary care    Pulm possible PNA  management as per primary on antibiotics    persistent atrial fibrillation  SUpratherpeutic INR yesterday 6.68-  Am labs pending   Continue with PO toprol BID    Disp : dementia on home hospice followed by palliative   Can discontinue telemetry     Katt Adair FNP-C  Cardiology NP  [id number] [phone]

## 2019-10-17 NOTE — PROGRESS NOTE ADULT - ASSESSMENT
89 white male with advanced dementia on hospice at home now admitted with possible sepsis with cystitis.  Rosas replaced.     Improving renal indices. Continue gentle IV hydration. Conservative management. On IV abx. Overall prognosis poor. 89 white male with advanced dementia on hospice at home now admitted with possible sepsis with cystitis.  Rosas replaced.     Improving renal indices. Continue gentle IV hydration. On IV abx. Overall prognosis poor.   Conservative management. For hospice evaluation.

## 2019-10-17 NOTE — PROGRESS NOTE ADULT - PROBLEM SELECTOR PLAN 10
DVT ppx:  IMPROVE VTE Score: 4 patient INR sub-therapeutic so holding warfarin will not need prophylaxis at this time, dose coumadin per INR
DVT ppx:  IMPROVE VTE Score: 4 patient INR sub-therapeutic so holding warfarin will not need prophylaxis at this time, dose coumadin per INR
DVT ppx:  IMPROVE VTE Score: 4 patient on warfarin will not need prophylaxis at this time, dose coumadin per INR    Palliative care consult - wife states patient had hospice referral this week but now would like full code at this time, although states that pt would not want life support - evaluate for DNR/DNI

## 2019-10-17 NOTE — PROGRESS NOTE ADULT - ASSESSMENT
89 y/o M with PMHx of systolic/diastolic CHF (EF 38%-60% in 4/2018), atrial fibrillation (on warfarin), HTN, HLD, BPH, bladder neoplasm, prostate cancer s/p radiation, depression, gout admitted with urinary retention, suspected UTI, hyponatremia, KAILEE on CKD IV and suspected pneumonia.

## 2019-10-18 ENCOUNTER — TRANSCRIPTION ENCOUNTER (OUTPATIENT)
Age: 84
End: 2019-10-18

## 2019-10-18 LAB
ANION GAP SERPL CALC-SCNC: 9 MMOL/L — SIGNIFICANT CHANGE UP (ref 5–17)
APTT BLD: 34.4 SEC — SIGNIFICANT CHANGE UP (ref 28.5–37)
BUN SERPL-MCNC: 70 MG/DL — HIGH (ref 7–23)
CALCIUM SERPL-MCNC: 8.5 MG/DL — SIGNIFICANT CHANGE UP (ref 8.5–10.1)
CHLORIDE SERPL-SCNC: 113 MMOL/L — HIGH (ref 96–108)
CO2 SERPL-SCNC: 19 MMOL/L — LOW (ref 22–31)
CREAT SERPL-MCNC: 2.4 MG/DL — HIGH (ref 0.5–1.3)
GLUCOSE SERPL-MCNC: 81 MG/DL — SIGNIFICANT CHANGE UP (ref 70–99)
HCT VFR BLD CALC: 30.1 % — LOW (ref 39–50)
HGB BLD-MCNC: 9.8 G/DL — LOW (ref 13–17)
INR BLD: 2.01 RATIO — HIGH (ref 0.88–1.16)
MCHC RBC-ENTMCNC: 31 PG — SIGNIFICANT CHANGE UP (ref 27–34)
MCHC RBC-ENTMCNC: 32.6 GM/DL — SIGNIFICANT CHANGE UP (ref 32–36)
MCV RBC AUTO: 95.3 FL — SIGNIFICANT CHANGE UP (ref 80–100)
NRBC # BLD: 0 /100 WBCS — SIGNIFICANT CHANGE UP (ref 0–0)
PLATELET # BLD AUTO: 279 K/UL — SIGNIFICANT CHANGE UP (ref 150–400)
POTASSIUM SERPL-MCNC: 3.5 MMOL/L — SIGNIFICANT CHANGE UP (ref 3.5–5.3)
POTASSIUM SERPL-SCNC: 3.5 MMOL/L — SIGNIFICANT CHANGE UP (ref 3.5–5.3)
PROTHROM AB SERPL-ACNC: 23.4 SEC — HIGH (ref 10–12.9)
RBC # BLD: 3.16 M/UL — LOW (ref 4.2–5.8)
RBC # FLD: 14.1 % — SIGNIFICANT CHANGE UP (ref 10.3–14.5)
SODIUM SERPL-SCNC: 141 MMOL/L — SIGNIFICANT CHANGE UP (ref 135–145)
WBC # BLD: 8.84 K/UL — SIGNIFICANT CHANGE UP (ref 3.8–10.5)
WBC # FLD AUTO: 8.84 K/UL — SIGNIFICANT CHANGE UP (ref 3.8–10.5)

## 2019-10-18 PROCEDURE — 99232 SBSQ HOSP IP/OBS MODERATE 35: CPT

## 2019-10-18 PROCEDURE — 99232 SBSQ HOSP IP/OBS MODERATE 35: CPT | Mod: GC

## 2019-10-18 PROCEDURE — 74230 X-RAY XM SWLNG FUNCJ C+: CPT | Mod: 26

## 2019-10-18 RX ORDER — PIPERACILLIN AND TAZOBACTAM 4; .5 G/20ML; G/20ML
3.38 INJECTION, POWDER, LYOPHILIZED, FOR SOLUTION INTRAVENOUS ONCE
Refills: 0 | Status: COMPLETED | OUTPATIENT
Start: 2019-10-18 | End: 2019-10-18

## 2019-10-18 RX ORDER — METOPROLOL TARTRATE 50 MG
100 TABLET ORAL DAILY
Refills: 0 | Status: DISCONTINUED | OUTPATIENT
Start: 2019-10-18 | End: 2019-10-19

## 2019-10-18 RX ORDER — CIPROFLOXACIN LACTATE 400MG/40ML
1 VIAL (ML) INTRAVENOUS
Qty: 14 | Refills: 0
Start: 2019-10-18 | End: 2019-10-24

## 2019-10-18 RX ORDER — LANOLIN ALCOHOL/MO/W.PET/CERES
5 CREAM (GRAM) TOPICAL AT BEDTIME
Refills: 0 | Status: DISCONTINUED | OUTPATIENT
Start: 2019-10-18 | End: 2019-10-19

## 2019-10-18 RX ORDER — CIPROFLOXACIN LACTATE 400MG/40ML
250 VIAL (ML) INTRAVENOUS DAILY
Refills: 0 | Status: DISCONTINUED | OUTPATIENT
Start: 2019-10-19 | End: 2019-10-19

## 2019-10-18 RX ORDER — PIPERACILLIN AND TAZOBACTAM 4; .5 G/20ML; G/20ML
3.38 INJECTION, POWDER, LYOPHILIZED, FOR SOLUTION INTRAVENOUS EVERY 12 HOURS
Refills: 0 | Status: COMPLETED | OUTPATIENT
Start: 2019-10-18 | End: 2019-10-18

## 2019-10-18 RX ORDER — PIPERACILLIN AND TAZOBACTAM 4; .5 G/20ML; G/20ML
3.38 INJECTION, POWDER, LYOPHILIZED, FOR SOLUTION INTRAVENOUS EVERY 8 HOURS
Refills: 0 | Status: DISCONTINUED | OUTPATIENT
Start: 2019-10-18 | End: 2019-10-18

## 2019-10-18 RX ORDER — WARFARIN SODIUM 2.5 MG/1
2 TABLET ORAL ONCE
Refills: 0 | Status: COMPLETED | OUTPATIENT
Start: 2019-10-18 | End: 2019-10-18

## 2019-10-18 RX ORDER — POTASSIUM CHLORIDE 20 MEQ
40 PACKET (EA) ORAL ONCE
Refills: 0 | Status: COMPLETED | OUTPATIENT
Start: 2019-10-18 | End: 2019-10-18

## 2019-10-18 RX ORDER — PIPERACILLIN AND TAZOBACTAM 4; .5 G/20ML; G/20ML
3.38 INJECTION, POWDER, LYOPHILIZED, FOR SOLUTION INTRAVENOUS ONCE
Refills: 0 | Status: DISCONTINUED | OUTPATIENT
Start: 2019-10-18 | End: 2019-10-18

## 2019-10-18 RX ADMIN — Medication 5 MILLIGRAM(S): at 21:24

## 2019-10-18 RX ADMIN — Medication 40 MILLIEQUIVALENT(S): at 09:32

## 2019-10-18 RX ADMIN — Medication 400 UNIT(S): at 09:32

## 2019-10-18 RX ADMIN — PIPERACILLIN AND TAZOBACTAM 25 GRAM(S): 4; .5 INJECTION, POWDER, LYOPHILIZED, FOR SOLUTION INTRAVENOUS at 23:05

## 2019-10-18 RX ADMIN — Medication 50 MILLIGRAM(S): at 05:19

## 2019-10-18 RX ADMIN — PIPERACILLIN AND TAZOBACTAM 200 GRAM(S): 4; .5 INJECTION, POWDER, LYOPHILIZED, FOR SOLUTION INTRAVENOUS at 11:46

## 2019-10-18 RX ADMIN — SODIUM CHLORIDE 50 MILLILITER(S): 9 INJECTION, SOLUTION INTRAVENOUS at 05:19

## 2019-10-18 RX ADMIN — WARFARIN SODIUM 2 MILLIGRAM(S): 2.5 TABLET ORAL at 21:24

## 2019-10-18 NOTE — SWALLOW VFSS/MBS ASSESSMENT ADULT - RECOMMENDED CONSISTENCY
1. Mechanical soft solids with nectar thick liquids.  2. Assist with feeding.  3. Safe swallowing guidelines/aspiration precautions: fully awake/alert, sit upright, small bites/sips, alternate bites/sips, check oral cavity for residue, pace meal slowly, remain upright ~30 minutes post swallow.  4. Aspiration precautions.  5. Maintain good oral hygiene.

## 2019-10-18 NOTE — PROGRESS NOTE ADULT - PROBLEM SELECTOR PLAN 4
has baseline dementia - wife reports patient becoming more weak and requiring soft diet over last week, while normally tolerating regular diet  metabolic encephalopathy likely 2/2 infectious pneumonia vs UTI vs hyponatremia  - continue IV antibiotic treatment with ceftriaxone and azithromycin, s/p 2L IV fluids  - f/u strep antigen, legionella urine antigen, sputum cultures, blood cultures, urine cultures
has baseline dementia - wife reports patient becoming more weak and requiring soft diet over last week, while normally tolerating regular diet  metabolic encephalopathy likely 2/2 infectious pneumonia vs UTI vs hyponatremia  -CT Head negative  - continue IV Zithromax 500 mg and IV Rosephin 1000 mg, s/p 2L IV fluids  - urine culture positive for GNR (Pseudomonas), legionella urine antigen negative  -Palliative Care consult acknowledged  -S&S: dysphagia diet
pro BNP elevated 48,779>14672  - holding home furosemide 40mg every other day, 20mg every other day and KCl 8meq every other day in setting of acute renal failure
has baseline dementia - wife reports patient becoming more weak and requiring soft diet over last week, while normally tolerating regular diet  metabolic encephalopathy likely 2/2 infectious pneumonia vs UTI vs hyponatremia  -F/u CT Head for AMS  - continue IV Zithromax 500 mg and IV Rosephin 1000 mg, s/p 2L IV fluids  - urine culture positive for GNR, legionella urine antigen negative  - f/u strep antigen, sputum cultures, blood cultures  -Consult Palliative Care

## 2019-10-18 NOTE — PROGRESS NOTE ADULT - PROBLEM SELECTOR PROBLEM 1
Palliative care encounter
Palliative care encounter
Urinary tract infection associated with indwelling urethral catheter, initial encounter
Coagulopathy
Urinary tract infection associated with indwelling urethral catheter, initial encounter

## 2019-10-18 NOTE — SWALLOW VFSS/MBS ASSESSMENT ADULT - ORAL PHASE
Delayed oral transit time/Reduced anterior - posterior transport/Uncontrolled bolus / spillover in yelitza-pharynx Uncontrolled bolus / spillover in hypopharynx/Delayed oral transit time/Reduced anterior - posterior transport Delayed oral transit time/Reduced anterior - posterior transport/Uncontrolled bolus / spillover in hypopharynx

## 2019-10-18 NOTE — SWALLOW VFSS/MBS ASSESSMENT ADULT - SLP PERTINENT HISTORY OF CURRENT PROBLEM
Per charting, "89 y/o M with PMHx of systolic/diastolic CHF (EF 38%-60% in 4/2018), atrial fibrillation (on warfarin), HTN, HLD, BPH, bladder neoplasm, prostate cancer s/p radiation, depression, gout admitted with urinary retention, suspected UTI, hyponatremia, KAILEE on CKD IV and suspected pneumonia."

## 2019-10-18 NOTE — PROGRESS NOTE ADULT - ASSESSMENT
89 white male with advanced dementia on hospice at home now admitted with possible sepsis with cystitis. Now on IV antibiotics.  Rosas replaced and was given IVF. Will hold the lasix for now.  spoke to patient's dtr and wife at bed side. Comfort care is appropriate.

## 2019-10-18 NOTE — SWALLOW VFSS/MBS ASSESSMENT ADULT - RECOMMENDED FEEDING/EATING TECHNIQUES
allow for swallow between intakes/check mouth frequently for oral residue/pocketing/provide rest periods between swallows/crush medication (when feasible)/oral hygiene/alternate food with liquid/maintain upright posture during/after eating for 30 mins/position upright (90 degrees)/small sips/bites

## 2019-10-18 NOTE — PROGRESS NOTE ADULT - PROBLEM SELECTOR PROBLEM 8
Prostate cancer
Atrial fibrillation
Atrial fibrillation
Need for prophylactic measure
Atrial fibrillation

## 2019-10-18 NOTE — CONSULT NOTE ADULT - SUBJECTIVE AND OBJECTIVE BOX
Infectious Diseases Consult by Amy Mercer MD    Reason for Consult :    HPI:  89 y/o M with PMHx of systolic/diastolic CHF (EF 38%-60% in 4/2018), atrial fibrillation (on warfarin), HTN, HLD, BPH, bladder neoplasm, prostate cancer s/p radiation, has chronic vega for several years , depression, gout presented to ED due to cough and no urine output for >24 hours. Patient is with chronic vega, changed one week ago. Was started on macrobid as outpatient (diagnosed with UTI over the phone per wife), had failed antibiotic therapy prior to this. Patient became more weak and "delirious" 2 days prior to admission with several episodes of diarrhea.  In the ED, urine in Vega bag was noted to be grossly pyuric. Multiple attempts made by nursing and ER physician to replace vega - US bladder without visualization of catheter and patient anxious/in discomfort. History obtained by wife due to patient's dementia. States that patient has not seen urology Dr. Brownlee for a while now.  Per wife, patient has been having cloudy urine on and off for 3 months without any treatment.  Vega was placed by Urology and over 400 cc purulent urine drained , Urine cs grew over [id number] of pseudomonas, he was on IV Rocephin that was changed ot Zosyn     In the ED, VS significant for Tmax 100.3 (rectal), . Labs significant for: WBC 20.63, Na 127, BUN/Cr 115/4.6. CXR showed small bilateral effusions, ?left infiltrate vs atelectasis. US bladder did not visualize vega catheter - showed mildly distended urinary bladder. EKG showed atrial fibrillation 97 with LAFB.     ID consult called for evaluation and antibiotic management as plan to dc home . he is on Home hospice program . His appetite is poor     Past Medical & Surgical Hx:  PAST MEDICAL & SURGICAL HISTORY:  Chronic kidney disease (CKD), stage IV (severe)  Prostate cancer: chronic vega  Hyperlipidemia  Hypertension  Atrial fibrillation  [place] ([place] of [place])  Bladder neoplasm  Congestive heart failure: hospitalized  once 12/2010  Acute congestive heart failure: 12/2010  Prostate cancer: 9/2011 - s/p external radiation treatments - last 12/9/11, s/p Bicalvutamide  Gout  Depression  Hypertension  Hyperlipidemia  Atrial Fibrillation: 1990  History of repair of hip fracture  Circumcision: age 5  History of Arthroscopy of Knee: left  S/P Cataract Extraction: LEFT  S/P Tonsillectomy      Social History-- Retired , [place]   EtOH: denies   Tobacco: denies   Drug Use: denies     FAMILY HISTORY:  Family history of cancer      Allergies    Hay Fever (Rhinorrhea)  No Known Drug Allergies    Intolerances    Home/ Out patient  Medications :  Home Medications:  furosemide 20 mg oral tablet: 1 tab(s) orally every other day (15 Oct 2019 03:01)  furosemide 40 mg oral tablet: 1 tab oral everyother day  (15 Oct 2019 03:01)  metoprolol succinate 50 mg oral tablet, extended release: 1 tab(s) orally 2 times a day (15 Oct 2019 03:01)  potassium chloride 8 mEq (600 mg) oral capsule, extended release: 1 tab(s) orally every other day (15 Oct 2019 03:01)  sertraline 25 mg oral tablet: 1 tab(s) orally once a day (15 Oct 2019 03:01)  Vitamin D3 400 intl units oral capsule: 1 cap(s) orally once a day (15 Oct 2019 03:01)  warfarin 2 mg oral tablet: 1 tab(s) orally once a day (15 Oct 2019 03:01)      Current Inpatient Medications :    ANTIBIOTICS:   piperacillin/tazobactam IVPB.. 3.375 Gram(s) IV Intermittent every 12 hours      OTHER RELEVANT MEDICATIONS :  cholecalciferol 400 Unit(s) Oral daily  dextrose 5% + sodium chloride 0.45%. 1000 milliLiter(s) IV Continuous <Continuous>  influenza   Vaccine 0.5 milliLiter(s) IntraMuscular once  metoprolol succinate  milliGRAM(s) Oral daily  morphine Concentrate 5 milliGRAM(s) SubLingual five times a day PRN  warfarin 2 milliGRAM(s) Oral once      ROS:  Unable to obtain due to : [place]             I&O's Detail    17 Oct 2019 07:01  -  18 Oct 2019 07:00  --------------------------------------------------------  IN:  Total IN: 0 mL    OUT:    Indwelling Catheter - Urethral: 1200 mL  Total OUT: 1200 mL    Total NET: -1200 mL      18 Oct 2019 07:01  -  18 Oct 2019 15:19  --------------------------------------------------------  IN:    dextrose 5% + sodium chloride 0.45%.: 300 mL    Solution: 100 mL  Total IN: 400 mL    OUT:  Total OUT: 0 mL    Total NET: 400 mL          Physical Exam:  Vital Signs Last 24 Hrs  T(C): 36.7 (18 Oct 2019 13:05), Max: 36.7 (18 Oct 2019 13:05)  T(F): 98.1 (18 Oct 2019 13:05), Max: 98.1 (18 Oct 2019 13:05)  HR: 92 (18 Oct 2019 13:05) (92 - 111)  BP: 124/88 (18 Oct 2019 13:05) (124/88 - 149/79)  BP(mean): --  RR: 18 (18 Oct 2019 13:05) (18 - 18)  SpO2: 95% (18 Oct 2019 13:05) (93% - 97%)      General: Elderly frail male poorly  nourished, in no acute distress  Eyes: sclera anicteric, pupils equal and reactive to light  ENMT: buccal mucosa moist, pharynx not injected  Neck: supple, trachea midline  Lungs: coarse breath sounds , no wheeze/rhonchi  Cardiovascular: regular rate and rhythm, S1 S2  Abdomen: soft, nontender, no organomegaly present, bowel sounds normal  Neurological:  alert and oriented x 1 , Cranial Nerves II-XII grossly intact  Skin :no increased ecchymosis/petechiae/purpura  Lymph Nodes: no palpable cervical/supraclavicular lymph nodes enlargements  Extremities: no cyanosis/clubbing, has 1 + leg edema    Labs:                  9.8    8.84   )----------(  279       ( 18 Oct 2019 08:23 )               30.1      141    |  113    |  70     ----------------------------<  81         ( 18 Oct 2019 08:23 )  3.5     |  19     |  2.40     Ca    8.5        ( 18 Oct 2019 08:23 )        PT/INR -  23.4 sec / 2.01 ratio   ( 18 Oct 2019 08:23 )  PTT -  34.4 sec   ( 18 Oct 2019 08:23 )      Urine Microscopic-Add On (NC) (10.15.19 @ 01:19)    Epithelial Cells: Occasional    Comment - Urine: Few Amorphous Urates    Red Blood Cell - Urine: 6-10 /HPF    Bacteria: Many    White Blood Cell - Urine: 11-25    Lactate, Blood (10.14.19 @ 20:48)    Lactate, Blood: 2.0 mmol/L    Serum Pro-Brain Natriuretic Peptide (10.15.19 @ 04:17)    Serum Pro-Brain Natriuretic Peptide: 81580 pg/mL        RECENT CULTURES:    Culture - Urine (collected 10-15-19 @ 09:11)  Source: .Urine Catheterized  Final Report (10-17-19 @ 09:43):    >100,000 CFU/ml Pseudomonas aeruginosa  Organism: Pseudomonas aeruginosa (10-17-19 @ 09:43)  Organism: Pseudomonas aeruginosa (10-17-19 @ 09:43)      -  Amikacin: S <=16      -  Aztreonam: S <=4      -  Cefepime: S <=4      -  Ceftazidime: S <=1      -  Ciprofloxacin: S <=1      -  Gentamicin: S <=4      -  Imipenem: S <=1      -  Levofloxacin: S <=2      -  Meropenem: S <=1      -  Piperacillin/Tazobactam: S <=16      -  Tobramycin: S <=4      Method Type: LAURENCE    Culture - Blood (collected 10-14-19 @ 22:58)  Source: .Blood Blood-Peripheral  Preliminary Report (10-15-19 @ 23:01):    No growth to date.    Culture - Blood (collected 10-14-19 @ 22:58)  Source: .Blood Blood-Peripheral  Preliminary Report (10-15-19 @ 23:01):    No growth to date.            RADIOLOGY & ADDITIONAL STUDIES:  CT Head No Cont (10.16.19 @ 13:38) >    IMPRESSION: No acute intracranial hemorrhage, mass effect, or shift of   the midline structures.    Similar-appearing chronic infarcts and microvascular disease as discussed.    CT Abdomen and Pelvis No Cont (10.15.19 @ 05:21) >  IMPRESSION: Limited examination secondary to exclusion of IV contrast.    1. Diffuse urinary bladder wall thickening with adjacent stranding   suggests acute cystitis. Underlying bladder neoplasm is not excluded.  2. Moderate right and mild left hydroureteronephrosis, likely due to   diffuse urinary bladder wall thickening.   3. Interval enlargement of a right common iliac lymph node measures 1.7 x   2.1 cm, either reactive to a pelvic inflammatory process or neoplastic.   4. Small bilateral pleural effusions.   5. Cholelithiasis.  6. Pancreatic head 3.2 cm cystic lesion is unchanged compared to   09/12/2017.   7. Minimal enlargement of an indeterminant 1.5 cm right kidney lower pole   partially exophytic cyst or solid nodule.        Assessment :   89 y/o M with PMHx of systolic/diastolic CHF (EF 38%-60% in 4/2018), atrial fibrillation (on warfarin), HTN, HLD, BPH, bladder neoplasm, prostate cancer s/p radiation, depression, gout admitted with urinary retention, suspected UTI, hyponatremia, KAILEE on CKD stage  IV was  suspected pneumonia . I doubt he has pneumonia , likely had exacerbation of chf on admission     His urine cs grew pseudomonas from admission Urine cs after vega was changed. His family wants him to go home tomorrow . he is also on coumadin      Plan :   - he can be dc home on PO Cipro 250 mg daily x 7 days, need to monitor INR as she is on coumadin  - overall prognosis is poor   - needs vega changed every 4 weeks     Continue with present regime .  Appropriate use of antibiotics and adverse effects reviewed.      I have discussed the above plan of care with patient and his family in detail. They expressed understanding of the treatment plan . Risks, benefits and alternatives discussed in detail. I have asked if they have any questions or concerns and appropriately addressed them to the best of my ability . He has advanced directives , he is DNR/DNI       > 45 minutes spent in direct patient care reviewing  the notes, lab data/ imaging , discussion with multidisciplinary team. All questions were addressed and answered to the best of my capacity .    Thank you for allowing me to participate in the care of your patient .      Amy Mercer MD  [phone]

## 2019-10-18 NOTE — DISCHARGE NOTE PROVIDER - NSDCCPCAREPLAN_GEN_ALL_CORE_FT
PRINCIPAL DISCHARGE DIAGNOSIS  Diagnosis: Urinary tract infection associated with indwelling urethral catheter, initial encounter  Assessment and Plan of Treatment: You were treated w/ IV ABX. Take PO Abx as instructed.  DC with Rosas  FU w/ Uro outpatient 1-2 weeks after discharge. Call office to schedule appointment.      SECONDARY DISCHARGE DIAGNOSES  Diagnosis: Anemia due to stage 4 chronic kidney disease  Assessment and Plan of Treatment: FU w/ heme/onc outpatient 1-2 weeks after discharge. Call office to schedule appointment.    Diagnosis: Atrial fibrillation  Assessment and Plan of Treatment: c/w coumadin. FU INR weekly  FU w/ Cardsoutpatient 1-2 weeks after discharge. Call office to schedule appointment.

## 2019-10-18 NOTE — PROGRESS NOTE ADULT - PROBLEM SELECTOR PLAN 1
improving on abiots. We will sign off today. Pt can resume q4week cath changes by vns after d/c
med history reviewed  Onc eval noted  GOC documented  pt is DNR DNI  on medical supportive regimen  on emp ABX - ? need -   discussed with wife and daughter - pt is appropriate for HOSPICE EVAL - they are agreeable to Hospice at home eval  will follow  pt is DNR DNI  monitor for needs  monitor for pain and discomfort
seen and examined  vs and meds reviewed  medical follow up notes reviewed  frail and weak - needs assist with all ADL  family a bedside -   pt is DNR DNI  was on Home Hospice -   MALIA hammond noted  roxanol on order for PRN use for distress, suffering, pain, dyspnea, -   will follow
chronic vega with pyuria   - WBC 20  - Uro Dr. Brownlee in the ED placed vega drained >400cc pus, slightly pink  - CT ab/pelv: bladder wall thickening w/ BL hydroureteronephrosis  - continue IV empiric antibiotic coverage with ceftriaxone   - f/u urine culture
chronic vega with pyuria   - WBC downtrending 10.77>7.97  - Uro Dr. Brownlee in the ED placed vega drained >400cc pus, slightly pink  - CT ab/pelv: bladder wall thickening w/ BL hydroureteronephrosis  - continue IV Zithromax 500 mg and IV Rocephin 1000 mg  - Urine cultures positive for GNR (Pseudomonas)
chronic vega with pyuria   - WBC downtrending 10.77>7.97  - Uro Dr. Brownlee in the ED placed vega drained >400cc pus, slightly pink  - CT ab/pelv: bladder wall thickening w/ BL hydroureteronephrosis  - start IV zosyn  - Urine cultures positive for GNR (Pseudomonas)
chronic vega with pyuria   - WBC 10.77  - Uro Dr. Brownlee in the ED placed vega drained >400cc pus, slightly pink  - CT ab/pelv: bladder wall thickening w/ BL hydroureteronephrosis  - continue IV Zithromax 500 mg and IV Rocephin 1000 mg  - Urine cultures positive for GNR

## 2019-10-18 NOTE — SWALLOW VFSS/MBS ASSESSMENT ADULT - COMMENTS
Clinical swallow assessment completed 10/16, at which time pt was recommended mechanical soft solids with nectar thick liquids.    There was limited subglottic view secondary to patient's body habitus; there was minimal improvement with repositioning attempts. As study progressed, pt required frequent assistance to remain in an upright position.

## 2019-10-18 NOTE — PROGRESS NOTE ADULT - PROBLEM SELECTOR PROBLEM 2
Other persistent atrial fibrillation
Acute renal failure

## 2019-10-18 NOTE — PROGRESS NOTE ADULT - PROBLEM SELECTOR PLAN 7
chronic, stable  - holding home sertraline in setting of hyponatremia
in the setting of recent antibiotic usage   - WBC 7.97, monitor stool burden, not suspicious of C diff at this time stool was loose, nonbloody, non-watery
in the setting of recent antibiotic usage vs Legionella  - WBC 20, monitor stool burden, not suspicious of C diff at this time stool was loose, nonbloody, non-watery
in the setting of recent antibiotic usage vs Legionella  - WBC 10.77, monitor stool burden, not suspicious of C diff at this time stool was loose, nonbloody, non-watery

## 2019-10-18 NOTE — PROGRESS NOTE ADULT - PROBLEM SELECTOR PLAN 3
CAP vs atelectasis on CXR  - f/u legionella/strep antigen, sputum cultures, blood cultures  - r/o legionella in setting of hyponatremia and diarrhea  - c/w azithromycin (pending legionella ag) and ceftriaxone
CAP vs atelectasis on CXR  - f/u strep antigen,   - BCx NGTD  - c/w azithromycin and ceftriaxone
has baseline dementia - wife reports patient becoming more weak and requiring soft diet over last week, while normally tolerating regular diet  metabolic encephalopathy likely 2/2 infectious pneumonia vs UTI vs hyponatremia  -CT Head negative  - c/w zosyn  - urine culture positive for GNR (Pseudomonas),  -Palliative Care consult acknowledged  -S&S: dysphagia diet
CAP vs atelectasis on CXR  - f/u strep antigen, sputum cultures, blood cultures  -legionella negative  - c/w azithromycin and ceftriaxone

## 2019-10-18 NOTE — DISCHARGE NOTE PROVIDER - HOSPITAL COURSE
FROM ADMISSION H+P:     HPI:    89 y/o M with PMHx of systolic/diastolic CHF (EF 38%-60% in 4/2018), atrial fibrillation (on warfarin), HTN, HLD, BPH, bladder neoplasm, prostate cancer s/p radiation, depression, gout presented to ED due to cough and no urine output for >24 hours. Patient is with chronic vega, changed one week ago. Was started on macrobid as outpatient (diagnosed with UTI over the phone per wife), had failed antibiotic therapy prior to this. Patient became more weak and "delirious" 2 days prior to admission with several episodes of diarrhea.  In the ED, urine in Vega bag was noted to be grossly pyuric. Multiple attempts made by nursing and ER physician to replace vega - US bladder without visualization of catheter and patient anxious/in discomfort. History obtained by wife due to patient's dementia. States that patient has not seen urology Dr. Brownlee for a while now.  Per wife, patient has been having cloudy urine on and off for 3 months without any treatment.          In the ED, VS significant for Tmax 100.3 (rectal), . Labs significant for: WBC 20.63, Na 127, BUN/Cr 115/4.6. CXR showed small bilateral effusions, ?left infiltrate vs atelectasis. US bladder did not visualize vega catheter - showed mildly distended urinary bladder. EKG showed atrial fibrillation 97 with LAFB. (14 Oct 2019 23:41)            ---    HOSPITAL COURSE:     89 y/o male brought in by wife to [place] ED due to altered mental status and cloudy urine admitted to [facility] with urinary retention, suspected UTI, hyponatremia, KAILEE on CKD IV and suspected pneumonia. Urology Dr. Brownlee was consulted, vega was replaced and the patient was started on abx.            ---    CONSULTANTS:     Nephro: Dr. Cantu    Cards: Dr. Negrete    Heme/Onc: Dr. Messer    Palliative: Dr. Perez    Uro: Dr. Brownlee        --- FROM ADMISSION H+P:     HPI:    89 y/o M with PMHx of systolic/diastolic CHF (EF 38%-60% in 4/2018), atrial fibrillation (on warfarin), HTN, HLD, BPH, bladder neoplasm, prostate cancer s/p radiation, depression, gout presented to ED due to cough and no urine output for >24 hours. Patient is with chronic vega, changed one week ago. Was started on macrobid as outpatient (diagnosed with UTI over the phone per wife), had failed antibiotic therapy prior to this. Patient became more weak and "delirious" 2 days prior to admission with several episodes of diarrhea.  In the ED, urine in Vega bag was noted to be grossly pyuric. Multiple attempts made by nursing and ER physician to replace vega - US bladder without visualization of catheter and patient anxious/in discomfort. History obtained by wife due to patient's dementia. States that patient has not seen urology Dr. Brownlee for a while now.  Per wife, patient has been having cloudy urine on and off for 3 months without any treatment.          In the ED, VS significant for Tmax 100.3 (rectal), . Labs significant for: WBC 20.63, Na 127, BUN/Cr 115/4.6. CXR showed small bilateral effusions, left infiltrate vs atelectasis. US bladder did not visualize vega catheter - showed mildly distended urinary bladder. EKG showed atrial fibrillation 97 with LAFB. (14 Oct 2019 23:41)            ---    HOSPITAL COURSE:     89 y/o male brought in by wife to [place] ED due to altered mental status and cloudy urine admitted to [facility] with urinary retention, suspected UTI, hyponatremia, KAILEE on CKD IV and suspected pneumonia. Urology Dr. Brownlee was consulted, vega was replaced and the patient was started on abx. Nephrology Dr. Cantu was consulted, no acute renal intervention indicated. Palliative Dr. Perez was consulted, patient on hospice care.            ---    CONSULTANTS:     Nephro: Dr. Cantu    Cards: Dr. Negrete    Heme/Onc: Dr. Messer    Palliative: Dr. Perez    Uro: Dr. Brownlee        --- FROM ADMISSION H+P:     HPI:    89 y/o M with PMHx of systolic/diastolic CHF (EF 38%-60% in 4/2018), atrial fibrillation (on warfarin), HTN, HLD, BPH, bladder neoplasm, prostate cancer s/p radiation, depression, gout presented to ED due to cough and no urine output for >24 hours. Patient is with chronic vega, changed one week ago. Was started on macrobid as outpatient (diagnosed with UTI over the phone per wife), had failed antibiotic therapy prior to this. Patient became more weak and "delirious" 2 days prior to admission with several episodes of diarrhea.  In the ED, urine in Vega bag was noted to be grossly pyuric. Multiple attempts made by nursing and ER physician to replace vega - US bladder without visualization of catheter and patient anxious/in discomfort. History obtained by wife due to patient's dementia. States that patient has not seen urology Dr. Brownlee for a while now.  Per wife, patient has been having cloudy urine on and off for 3 months without any treatment.          In the ED, VS significant for Tmax 100.3 (rectal), . Labs significant for: WBC 20.63, Na 127, BUN/Cr 115/4.6. CXR showed small bilateral effusions, left infiltrate vs atelectasis. US bladder did not visualize vega catheter - showed mildly distended urinary bladder. EKG showed atrial fibrillation 97 with LAFB. (14 Oct 2019 23:41)            ---    HOSPITAL COURSE:     89 y/o male brought in by wife to [place] ED due to altered mental status and cloudy urine admitted to [facility] with urinary retention, suspected UTI, hyponatremia, KAILEE on CKD IV and suspected pneumonia. Urology Dr. Brownlee was consulted, vega was replaced and the patient was started on abx. Nephrology Dr. Cantu was consulted, no acute renal intervention indicated. Palliative Dr. Perez was consulted, patient on hospice care. Cardiology was consulted, no new echo needed as patient is on hospice w/ conservative management. Heme/Onc Dr. Messer consulted, patient to follow up outpatient as needed. The rest of the hospital stay was unremarkable. The patient was discharged in stable condition to follow up as outpatient.            ---    CONSULTANTS:     Nephro: Dr. Cantu    Cards: Dr. Negrete    Heme/Onc: Dr. Messer    Palliative: Dr. Perez    Uro: Dr. Brownlee        --- FROM ADMISSION H+P:     HPI:    89 y/o M with PMHx of systolic/diastolic CHF (EF 38%-60% in 4/2018), atrial fibrillation (on warfarin), HTN, HLD, BPH, bladder neoplasm, prostate cancer s/p radiation, depression, gout presented to ED due to cough and no urine output for >24 hours. Patient is with chronic vega, changed one week ago. Was started on macrobid as outpatient (diagnosed with UTI over the phone per wife), had failed antibiotic therapy prior to this. Patient became more weak and "delirious" 2 days prior to admission with several episodes of diarrhea.  In the ED, urine in Vega bag was noted to be grossly pyuric. Multiple attempts made by nursing and ER physician to replace vega - US bladder without visualization of catheter and patient anxious/in discomfort. History obtained by wife due to patient's dementia. States that patient has not seen urology Dr. Brownlee for a while now.  Per wife, patient has been having cloudy urine on and off for 3 months without any treatment.          In the ED, VS significant for Tmax 100.3 (rectal), . Labs significant for: WBC 20.63, Na 127, BUN/Cr 115/4.6. CXR showed small bilateral effusions, left infiltrate vs atelectasis. US bladder did not visualize vega catheter - showed mildly distended urinary bladder. EKG showed atrial fibrillation 97 with LAFB. (14 Oct 2019 23:41)            ---    HOSPITAL COURSE:     89 y/o male brought in by wife to [place] ED due to altered mental status and cloudy urine admitted to [facility] with urinary retention, suspected UTI, hyponatremia, KAILEE on CKD IV and suspected pneumonia. Urology Dr. Brownlee was consulted, vega was replaced and the patient was started on abx. Nephrology Dr. Canut was consulted, no acute renal intervention indicated. Palliative Dr. Perez was consulted, patient on hospice care. Cardiology was consulted, no new echo needed as patient is on hospice w/ conservative management. Heme/Onc Dr. Messer consulted, patient to follow up outpatient as needed. The rest of the hospital stay was unremarkable. The patient was discharged in stable condition to follow up as outpatient.            ---    CONSULTANTS:     Nephro: Dr. Cnatu    Cards: Dr. Negrete    Heme/Onc: Dr. Messer    Palliative: Dr. Perez    Uro: Dr. Brownlee        pt seen and examined    time spent on dc was more then 32 minutes        ---

## 2019-10-18 NOTE — PROGRESS NOTE ADULT - PROBLEM SELECTOR PROBLEM 4
Pneumonia due to infectious organism, unspecified laterality, unspecified part of lung
Acute on chronic combined systolic and diastolic congestive heart failure
Altered mental state

## 2019-10-18 NOTE — PROGRESS NOTE ADULT - PROBLEM SELECTOR PLAN 6
chronic, stable  - continue metoprolol ER 50 mg BID with hold parameters  - c/w warfarin (INR still elevated 6.68>4.34>2.01) s/p vit k  - Monitor INR daily  - Heme consult acknowledged

## 2019-10-18 NOTE — PROGRESS NOTE ADULT - PROBLEM SELECTOR PROBLEM 5
Chronic combined systolic and diastolic congestive heart failure
Acute on chronic combined systolic and diastolic congestive heart failure
Acute on chronic combined systolic and diastolic congestive heart failure
Hyponatremia
Acute on chronic combined systolic and diastolic congestive heart failure

## 2019-10-18 NOTE — SWALLOW VFSS/MBS ASSESSMENT ADULT - NS SWALLOW VFSS REC ASPIR MON
fever/change of breathing pattern/position upright (90Y)/gurgly voice/upper respiratory infection/oral hygiene/cough/pneumonia/throat clearing

## 2019-10-18 NOTE — CONSULT NOTE ADULT - REASON FOR ADMISSION
cough, weakness, urinary retention

## 2019-10-18 NOTE — PROGRESS NOTE ADULT - ASSESSMENT
89 y/o M with PMHx of NICM now with improved ef, diastolic CHF , atrial fibrillation (on warfarin), HTN, HLD, BPH, bladder neoplasm, prostate cancer s/p radiation, depression, gout, on home hospice now  admitted with urinary retention, suspected UTI, hyponatremia, KAILEE on CKD IV and suspected pneumonia.    Carotid Doppler 2/19 - mild-moderate plaque   Stress Test: 12/15-Chem, no Ischemia, ?Inf-Apical MI, EF=42%   Echo: 4/18, mild MR, TR, PAP=46 LVEF 60%.     Diastolic congestive heart failure  - Euvolemic on exam  - Continue Toprol XL (will increase dose)  - No ACEI/ARB secondary to KAILEE/CKD  - No need to repeat 2decho - palliative notes reviewed on hospice pending , conservative management     Afib  - HR is not fully controlled  - Increase Toprol XL to 100 mg  - Coumadin has been on hold secondary to supratherapeutic INR.  He is s/p Vit K  - His INR now is normal (2.01)    KAILEE on CKD  - Avoid nephrotoxics  - His renal indices continue to improve (2.4 <-- 2.8 <-- 3.3)  - Renal following    Pulm possible PNA/UTI  - Per primary on antibiotics    Palliative following  Awaiting Hospice     Renee Alegria DNP, NP-C  Cardiology   Spectra #[phone] 89 y/o M with PMHx of NICM now with improved ef, diastolic CHF , atrial fibrillation (on warfarin), HTN, HLD, BPH, bladder neoplasm, prostate cancer s/p radiation, depression, gout, on home hospice now  admitted with urinary retention, suspected UTI, hyponatremia, KAILEE on CKD IV and suspected pneumonia.    Carotid Doppler 2/19 - mild-moderate plaque   Stress Test: 12/15-Chem, no Ischemia, ?Inf-Apical MI, EF=42%   Echo: 4/18, mild MR, TR, PAP=46 LVEF 60%.     Diastolic congestive heart failure  - Euvolemic on exam  - Continue Toprol XL (will increase dose)  - No ACEI/ARB secondary to KAILEE/CKD  - No need to repeat 2decho - palliative notes reviewed on hospice pending , conservative management     Afib  - HR is not fully controlled  - Increase Toprol XL to 100 mg  - Coumadin had been on hold secondary to supratherapeutic INR.  He is s/p Vit K  - His INR now is within goal (2.01)    KAILEE on CKD  - Avoid nephrotoxics  - His renal indices continue to improve (2.4 <-- 2.8 <-- 3.3)  - Renal following    Pulm possible PNA/UTI  - Per primary on antibiotics    Palliative following  Awaiting Hospice     Renee Alegria DNP, NP-C  Cardiology   Spectra #[phone]

## 2019-10-18 NOTE — CONSULT NOTE ADULT - CONSULT REASON
Atrial fibrillation, episodes of wide QRS
KAILEE
Pseudomonas UTI ( CAUTI)
weakness  CHF  CKD  KAILEE  Acute Infection eval  frailty and weakness  frail elderly
urinary etention
coagulopathy

## 2019-10-18 NOTE — PROGRESS NOTE ADULT - PROBLEM SELECTOR PLAN 5
acute hyponatremia (127) likely 2/2 acute renal failure/hypovolemia  - Na improved (141)  - hold sertraline for now  - Monitor bmp  - Nephology Dr. Sánchez following
pro BNP 48,000>40006  - holding home furosemide 40mg every other day, 20mg every other day and KCl 8meq every other day in setting of acute renal failure
pro BNP elevated 48,779>83815  - holding home furosemide 40mg every other day, 20mg every other day and KCl 8meq every other day in setting of acute renal failure
pro BNP elevated 48,779>66465  - holding home furosemide 40mg every other day, 20mg every other day and KCl 8meq every other day in setting of acute renal failure

## 2019-10-18 NOTE — PROGRESS NOTE ADULT - PROBLEM SELECTOR PLAN 8
DVT ppx:  IMPROVE VTE Score: 4 c/w warfarin monitor INR
chronic, stable  - continue metoprolol ER 50 mg BID with hold parameters  - hold warfarin (INR still elevated 6.68>4.34) s/p vit k  - Monitor INR daily  - Heme consult acknowledged
chronic, stable  - continue metoprolol ER 50 mg BID with hold parameters  - on warfarin (recently decreased dose in setting of infection and kidney failure)   - Monitor INR daily
chronic, stable  - continue metoprolol ER 50 mg BID with hold parameters  - hold warfarin (INR elevated 5.34>6.68)   - Monitor INR daily  - FU Heme c/s

## 2019-10-18 NOTE — PHARMACOTHERAPY INTERVENTION NOTE - COMMENTS
Discussed with Dr. Almonte that patient came in with supratherapeutic INR with home coumadin dose of 2mg. Recommended to decrease to 1mg for tonight and see INR trend however he would like to keep it at 2mg.

## 2019-10-18 NOTE — DISCHARGE NOTE PROVIDER - PROVIDER TOKENS
PROVIDER:[TOKEN:[[id number]],FOLLOWUP:[1 week]],PROVIDER:[TOKEN:[[id number]],FOLLOWUP:[1 week]],PROVIDER:[TOKEN:[[id number]],FOLLOWUP:[1 week]],PROVIDER:[TOKEN:[[id number]],FOLLOWUP:[1 week]]

## 2019-10-18 NOTE — PROGRESS NOTE ADULT - PROBLEM SELECTOR PROBLEM 3
Urinary tract infection associated with indwelling urethral catheter, initial encounter
Altered mental state
Pneumonia

## 2019-10-18 NOTE — PROGRESS NOTE ADULT - PROBLEM SELECTOR PROBLEM 6
Acute renal failure, unspecified acute renal failure type
Atrial fibrillation
Hyponatremia

## 2019-10-18 NOTE — DISCHARGE NOTE PROVIDER - CARE PROVIDERS DIRECT ADDRESSES
,DirectAddress_Unknown,[email or web address],DirectAddress_Unknown,DirectAddress_Unknown

## 2019-10-18 NOTE — CONSULT NOTE ADULT - CONSULT REQUESTED DATE/TIME
16-Oct-2019 07:19
15-Oct-2019 12:32
16-Oct-2019 12:43
18-Oct-2019 15:19
15-Oct-2019 00:43
16-Oct-2019 22:44

## 2019-10-18 NOTE — SWALLOW VFSS/MBS ASSESSMENT ADULT - DIAGNOSTIC IMPRESSIONS
Pt p/w 1. Mild to moderate oral dysphagia when given puree, regular solids, honey thick liquids, nectar thick liquids, and thin liquids marked by reduced utensil stripping, adequate bolus containment, prolonged mastication, reduced bolus cohesion resulting in premature spillage (to the oropharynx with puree, hypopharynx with regular solids and across all liquids), delayed oral transit time, piecemeal swallow x2, and mild oral residue post swallow. 2. Moderate pharyngeal dysphagia with thin liquids marked by delayed pharyngeal swallow trigger (at the level of the pyriforms), adequate BOT retraction, adequate pharyngeal constriction, reduced hyolaryngeal elevation/excursion, and incomplete epiglottic retroflexion. There was penetration during the swallow with cough response; however, cough was ineffective in clearing residue from laryngeal vestibule. There was trace pharyngeal residue in the valleculae and pyriforms. 3. Mild pharyngeal dysphagia when given puree, regular solids, and nectar thick liquids marked by delayed pharyngeal swallow trigger (at the level of the pyriforms), adequate BOT retraction, adequate pharyngeal constriction, reduced hyolaryngeal elevation/excursion, and incomplete epiglottic retroflexion. There was shallow penetration above the level of the VFs with nectar thick liquids with full retrieval upon completion of swallow. There was no penetration and/or aspiration with puree or regular solids.

## 2019-10-18 NOTE — DISCHARGE NOTE PROVIDER - CARE PROVIDER_API CALL
Zay Cantu)  Nephrology  [address], [address]  [place], [place] [id number]  Phone: [phone]  Fax: [phone]  Follow Up Time: 1 week    Jarret Negrete)  Internal Medicine  [address] [place] [address]  Phone: [phone]  Fax: [phone]  Follow Up Time: 1 week    Hon RIDGE Messer)  Hematology; Internal Medicine; Medical Oncology  [address], [address]  [place] [address]  Phone: [phone]  Fax: [phone]  Follow Up Time: 1 week    Nithin Perez)  Critical Care Medicine; HospicePalliative Medicine; Internal Medicine; Pulmonary Disease  [address] [place] [address]  Phone: [phone]  Fax: [phone]  Follow Up Time: 1 week

## 2019-10-18 NOTE — PROGRESS NOTE ADULT - PROBLEM SELECTOR PLAN 2
KAILEE on CKD 4 (wife reports baseline Cr of ~2.6)  - Cr downtrending 3.3>2.8 - received 2L in ED  - monitor volume status closely  - monitor renal indices, avoid nephrotoxic agents, strict intake and output  - nephrology Dr. Sánchez following
KAILEE on CKD 4 (wife reports baseline Cr of ~2.6)  - Cr downtrending 3.3>2.8>2.4 - received 2L in ED  - monitor volume status closely  - monitor renal indices, avoid nephrotoxic agents, strict intake and output  - nephrology Dr. Sánchez following
KAILEE on CKD 4 (wife reports baseline Cr of ~2.6)  - Cr downtrending 4.6>4.0 - received 2L in ED  - monitor volume status closely  - monitor renal indices, avoid nephrotoxic agents, strict intake and output  - nephrology Dr. Sánchez following
KAILEE on CKD 4 (wife reports baseline Cr of ~2.6)  - Cr downtrending 3.4>3.3 - received 2L in ED  - monitor volume status closely  - monitor renal indices, avoid nephrotoxic agents, strict intake and output  - nephrology Dr. Sánchez following

## 2019-10-18 NOTE — SWALLOW VFSS/MBS ASSESSMENT ADULT - SLP PRECAUTIONS/LIMITATIONS: HEARING
Identified pt with two pt identifiers(name and ). Chief Complaint   Patient presents with    Immunization/Injection     Tdap        Health Maintenance Due   Topic    Hepatitis B Peds Age 0-18 (1 of 3 - Primary Series)    IPV Peds Age 0-24 (1 of 4 - All-IPV Series)    Varicella Peds Age 1-18 (1 of 2 - 2 Dose Childhood Series)    Hepatitis A Peds Age 1-18 (1 of 2 - Standard Series)    MMR Peds Age 1-18 (1 of 2)    DTaP/Tdap/Td series (1 - Tdap)    HPV AGE 9Y-26Y (1 of 3 - Female 3 Dose Series)    MCV through Age 25 (1 of 2)       Wt Readings from Last 3 Encounters:   17 99 lb (44.9 kg) (74 %, Z= 0.65)*   16 95 lb 9.6 oz (43.4 kg) (78 %, Z= 0.78)*   16 97 lb (44 kg) (80 %, Z= 0.85)*     * Growth percentiles are based on CDC 2-20 Years data. Temp Readings from Last 3 Encounters:   17 98.2 °F (36.8 °C) (Oral)   16 (!) 100.6 °F (38.1 °C) (Oral)   16 98.4 °F (36.9 °C) (Oral)     BP Readings from Last 3 Encounters:   17 112/72   16 112/70   16 108/67     Pulse Readings from Last 3 Encounters:   17 78   16 143   16 89         Learning Assessment:  :     Learning Assessment 2016   PRIMARY LEARNER Patient   HIGHEST LEVEL OF EDUCATION - PRIMARY LEARNER  DID NOT GRADUATE HIGH SCHOOL   BARRIERS PRIMARY LEARNER NONE   CO-LEARNER CAREGIVER Yes   CO-LEARNER NAME mother   PRIMARY LANGUAGE ENGLISH   LEARNER PREFERENCE PRIMARY LISTENING   ANSWERED BY patient   RELATIONSHIP SELF           Coordination of Care Questionnaire:  :     1) Have you been to an emergency room, urgent care clinic since your last visit? no   Hospitalized since your last visit? no             2) Have you seen or consulted any other health care providers outside of [address] [place] [address] since your last visit? no  (Include any pap smears or colon screenings in this section.)    3) Do you have an Advance Directive on file?  no  Are you interested in receiving information about Advance Directives? no    Patient is accompanied by mother I have received verbal consent from Zechariah Holt to discuss any/all medical information while they are present in the room. Reviewed record in preparation for visit and have obtained necessary documentation. Medication reconciliation up to date and corrected with patient at this time. impaired

## 2019-10-19 ENCOUNTER — TRANSCRIPTION ENCOUNTER (OUTPATIENT)
Age: 84
End: 2019-10-19

## 2019-10-19 VITALS
TEMPERATURE: 97 F | OXYGEN SATURATION: 99 % | RESPIRATION RATE: 19 BRPM | SYSTOLIC BLOOD PRESSURE: 124 MMHG | HEART RATE: 108 BPM | DIASTOLIC BLOOD PRESSURE: 74 MMHG

## 2019-10-19 LAB
ANION GAP SERPL CALC-SCNC: 8 MMOL/L — SIGNIFICANT CHANGE UP (ref 5–17)
APTT BLD: 33.9 SEC — SIGNIFICANT CHANGE UP (ref 28.5–37)
BUN SERPL-MCNC: 68 MG/DL — HIGH (ref 7–23)
CALCIUM SERPL-MCNC: 8.6 MG/DL — SIGNIFICANT CHANGE UP (ref 8.5–10.1)
CHLORIDE SERPL-SCNC: 114 MMOL/L — HIGH (ref 96–108)
CO2 SERPL-SCNC: 21 MMOL/L — LOW (ref 22–31)
CREAT SERPL-MCNC: 2.4 MG/DL — HIGH (ref 0.5–1.3)
CULTURE RESULTS: SIGNIFICANT CHANGE UP
CULTURE RESULTS: SIGNIFICANT CHANGE UP
GLUCOSE SERPL-MCNC: 86 MG/DL — SIGNIFICANT CHANGE UP (ref 70–99)
HCT VFR BLD CALC: 31 % — LOW (ref 39–50)
HGB BLD-MCNC: 9.9 G/DL — LOW (ref 13–17)
INR BLD: 2.07 RATIO — HIGH (ref 0.88–1.16)
MCHC RBC-ENTMCNC: 31 PG — SIGNIFICANT CHANGE UP (ref 27–34)
MCHC RBC-ENTMCNC: 31.9 GM/DL — LOW (ref 32–36)
MCV RBC AUTO: 97.2 FL — SIGNIFICANT CHANGE UP (ref 80–100)
NRBC # BLD: 0 /100 WBCS — SIGNIFICANT CHANGE UP (ref 0–0)
PLATELET # BLD AUTO: 317 K/UL — SIGNIFICANT CHANGE UP (ref 150–400)
POTASSIUM SERPL-MCNC: 4.5 MMOL/L — SIGNIFICANT CHANGE UP (ref 3.5–5.3)
POTASSIUM SERPL-SCNC: 4.5 MMOL/L — SIGNIFICANT CHANGE UP (ref 3.5–5.3)
PROTHROM AB SERPL-ACNC: 23.9 SEC — HIGH (ref 10–12.9)
RBC # BLD: 3.19 M/UL — LOW (ref 4.2–5.8)
RBC # FLD: 14.2 % — SIGNIFICANT CHANGE UP (ref 10.3–14.5)
SODIUM SERPL-SCNC: 143 MMOL/L — SIGNIFICANT CHANGE UP (ref 135–145)
SPECIMEN SOURCE: SIGNIFICANT CHANGE UP
SPECIMEN SOURCE: SIGNIFICANT CHANGE UP
WBC # BLD: 10.23 K/UL — SIGNIFICANT CHANGE UP (ref 3.8–10.5)
WBC # FLD AUTO: 10.23 K/UL — SIGNIFICANT CHANGE UP (ref 3.8–10.5)

## 2019-10-19 PROCEDURE — 84300 ASSAY OF URINE SODIUM: CPT

## 2019-10-19 PROCEDURE — 84100 ASSAY OF PHOSPHORUS: CPT

## 2019-10-19 PROCEDURE — 99285 EMERGENCY DEPT VISIT HI MDM: CPT | Mod: 25

## 2019-10-19 PROCEDURE — 87186 SC STD MICRODIL/AGAR DIL: CPT

## 2019-10-19 PROCEDURE — 83605 ASSAY OF LACTIC ACID: CPT

## 2019-10-19 PROCEDURE — 74230 X-RAY XM SWLNG FUNCJ C+: CPT

## 2019-10-19 PROCEDURE — 84156 ASSAY OF PROTEIN URINE: CPT

## 2019-10-19 PROCEDURE — 87449 NOS EACH ORGANISM AG IA: CPT

## 2019-10-19 PROCEDURE — 85610 PROTHROMBIN TIME: CPT

## 2019-10-19 PROCEDURE — 71045 X-RAY EXAM CHEST 1 VIEW: CPT

## 2019-10-19 PROCEDURE — 87086 URINE CULTURE/COLONY COUNT: CPT

## 2019-10-19 PROCEDURE — 83935 ASSAY OF URINE OSMOLALITY: CPT

## 2019-10-19 PROCEDURE — 83880 ASSAY OF NATRIURETIC PEPTIDE: CPT

## 2019-10-19 PROCEDURE — 82570 ASSAY OF URINE CREATININE: CPT

## 2019-10-19 PROCEDURE — 96365 THER/PROPH/DIAG IV INF INIT: CPT

## 2019-10-19 PROCEDURE — 99238 HOSP IP/OBS DSCHRG MGMT 30/<: CPT

## 2019-10-19 PROCEDURE — A9698: CPT

## 2019-10-19 PROCEDURE — 85730 THROMBOPLASTIN TIME PARTIAL: CPT

## 2019-10-19 PROCEDURE — 80048 BASIC METABOLIC PNL TOTAL CA: CPT

## 2019-10-19 PROCEDURE — 93005 ELECTROCARDIOGRAM TRACING: CPT

## 2019-10-19 PROCEDURE — 92610 EVALUATE SWALLOWING FUNCTION: CPT

## 2019-10-19 PROCEDURE — 96375 TX/PRO/DX INJ NEW DRUG ADDON: CPT

## 2019-10-19 PROCEDURE — 83930 ASSAY OF BLOOD OSMOLALITY: CPT

## 2019-10-19 PROCEDURE — 70450 CT HEAD/BRAIN W/O DYE: CPT

## 2019-10-19 PROCEDURE — 80053 COMPREHEN METABOLIC PANEL: CPT

## 2019-10-19 PROCEDURE — 92611 MOTION FLUOROSCOPY/SWALLOW: CPT

## 2019-10-19 PROCEDURE — 82436 ASSAY OF URINE CHLORIDE: CPT

## 2019-10-19 PROCEDURE — 83735 ASSAY OF MAGNESIUM: CPT

## 2019-10-19 PROCEDURE — 36415 COLL VENOUS BLD VENIPUNCTURE: CPT

## 2019-10-19 PROCEDURE — 87040 BLOOD CULTURE FOR BACTERIA: CPT

## 2019-10-19 PROCEDURE — 99231 SBSQ HOSP IP/OBS SF/LOW 25: CPT

## 2019-10-19 PROCEDURE — 76857 US EXAM PELVIC LIMITED: CPT

## 2019-10-19 PROCEDURE — 74176 CT ABD & PELVIS W/O CONTRAST: CPT

## 2019-10-19 PROCEDURE — 81001 URINALYSIS AUTO W/SCOPE: CPT

## 2019-10-19 PROCEDURE — 85027 COMPLETE CBC AUTOMATED: CPT

## 2019-10-19 RX ORDER — CIPROFLOXACIN LACTATE 400MG/40ML
1 VIAL (ML) INTRAVENOUS
Qty: 0 | Refills: 0 | DISCHARGE
Start: 2019-10-19

## 2019-10-19 RX ORDER — FUROSEMIDE 40 MG
1 TABLET ORAL
Qty: 0 | Refills: 0 | DISCHARGE

## 2019-10-19 RX ORDER — POTASSIUM CHLORIDE 20 MEQ
1 PACKET (EA) ORAL
Qty: 0 | Refills: 0 | DISCHARGE

## 2019-10-19 RX ORDER — SERTRALINE 25 MG/1
1 TABLET, FILM COATED ORAL
Qty: 0 | Refills: 0 | DISCHARGE

## 2019-10-19 RX ADMIN — Medication 400 UNIT(S): at 13:31

## 2019-10-19 RX ADMIN — Medication 250 MILLIGRAM(S): at 13:31

## 2019-10-19 NOTE — PROGRESS NOTE ADULT - REASON FOR ADMISSION
cough, weakness, urinary retention

## 2019-10-19 NOTE — PROGRESS NOTE ADULT - ASSESSMENT
89 y/o M with PMHx of NICM now with improved ef, diastolic CHF , atrial fibrillation (on warfarin), HTN, HLD, BPH, bladder neoplasm, prostate cancer s/p radiation, depression, gout, on home hospice now  admitted with urinary retention, suspected UTI, hyponatremia, KAILEE on CKD IV and suspected pneumonia.    Carotid Doppler 2/19 - mild-moderate plaque   Stress Test: 12/15-Chem, no Ischemia, ?Inf-Apical MI, EF=42%   Echo: 4/18, mild MR, TR, PAP=46 LVEF 60%.     Diastolic congestive heart failure  - Euvolemic on exam  - Continue Toprol XL (will increase dose)  - No ACEI/ARB secondary to KAILEE/CKD  - No need to repeat 2decho - palliative notes reviewed on hospice pending , conservative management     Afib  - HR is not fully controlled.  HR trend   - Cont increased dose of Toprol XL to 100 mg  SBP labile 107-149.  BP does not allow to titrate further.   - Coumadin had been on hold secondary to supratherapeutic INR.  He is s/p Vit K.  Restarted received Coumadin 2mg 10/18  - His INR now is within goal (2.07) goal INR 2-3.     KAILEE on CKD  - Avoid nephrotoxics  - His renal indices continue to improve (2.4 <-- 2.8 <-- 3.3)  - Renal following    Pulm possible PNA/UTI  - Per primary on antibiotics    Palliative following  [facility]     CANDICE Rincon-BC  Cardiology   Spectra #[phone] 89 y/o M with PMHx of NICM now with improved ef, diastolic CHF , atrial fibrillation (on warfarin), HTN, HLD, BPH, bladder neoplasm, prostate cancer s/p radiation, depression, gout, on home hospice now  admitted with urinary retention, suspected UTI, hyponatremia, KAILEE on CKD IV and suspected pneumonia.    Carotid Doppler 2/19 - mild-moderate plaque   Stress Test: 12/15-Chem, no Ischemia, ?Inf-Apical MI, EF=42%   Echo: 4/18, mild MR, TR, PAP=46 LVEF 60%.     Diastolic congestive heart failure  - Euvolemic on exam  - Continue Toprol XL (will increase dose)  - No ACEI/ARB secondary to KAILEE/CKD  - No need to repeat 2decho - palliative notes reviewed on hospice pending , conservative management     Afib  - HR is not fully controlled.  HR trend   - Cont increased dose of Toprol XL to 100 mg  SBP labile 107-149.  BP does not allow to titrate further.   - Coumadin had been on hold secondary to supratherapeutic INR.  He is s/p Vit K.  Restarted and received Coumadin 2mg 10/18  - His INR now is within goal (2.07) goal INR 2-3.     KAILEE on CKD  - Avoid nephrotoxics  - His renal indices continue to improve (2.4 <-- 2.8 <-- 3.3)  - Renal following    Pulm possible PNA/UTI  - Per primary on antibiotics    Palliative following  [facility]     CANDICE Rincon-BC  Cardiology   Spectra #[phone]

## 2019-10-19 NOTE — PROGRESS NOTE ADULT - ATTENDING COMMENTS
The patient was personally seen and examined, in addition to being examined and evaluated by NP.  All elements of the note were edited where appropriate.
I saw and examined the patient personally. Spoke with above provider regarding this case. I reviewed the above findings completely.  I agree with the above history, physical, and plan which I have edited where appropriate.
I saw and examined the patient personally. Spoke with above provider regarding this case. I reviewed the above findings completely.  I agree with the above history, physical, and plan which I have edited where appropriate.
pt paco nd examined  agree with assessment and plan
pt has been seen and examined  agree with assessment and plan
pt seen and examined  ct brain to rule out stroke  dnr.dni

## 2019-10-19 NOTE — PROGRESS NOTE ADULT - PROVIDER SPECIALTY LIST ADULT
Cardiology
Cardiology
Heme/Onc
Hospitalist
Nephrology
Palliative Care
Palliative Care
Urology
Urology
Cardiology

## 2019-10-19 NOTE — CHART NOTE - NSCHARTNOTEFT_GEN_A_CORE
Assessment:   patient seen being fed by CNA . doing well with breakfast meal. patient confused unable to interview. MOLST no tube feeding noted. malnutrition follow up at this time.  Factors impacting intake: [ ] none [ ] nausea  [ ] vomiting [ ] diarrhea [ ] constipation  [ ]chewing problems [x ] swallowing issues  [ ] other: diet per speech status post MBS    Diet Presciption: Diet, Dysphagia 2 Mechanical Soft-Nectar Consistency Fluid:   DASH/TLC {Sodium & Cholesterol Restricted}  Supplement Feeding Modality:  Oral  Ensure Enlive Servings Per Day:  1       Frequency:  Daily (10-17-19 @ 07:46)    Intake: 15-80% of meals taken doing well with breakfast this AM with assist    Current Weight: Weight 10/16 wt 138.4# increased from admit       Pertinent Medications: MEDICATIONS  (STANDING):  cholecalciferol 400 Unit(s) Oral daily  ciprofloxacin     Tablet 250 milliGRAM(s) Oral daily  dextrose 5% + sodium chloride 0.45%. 1000 milliLiter(s) (50 mL/Hr) IV Continuous <Continuous>  influenza   Vaccine 0.5 milliLiter(s) IntraMuscular once  melatonin 5 milliGRAM(s) Oral at bedtime  metoprolol succinate  milliGRAM(s) Oral daily    MEDICATIONS  (PRN):  morphine Concentrate 5 milliGRAM(s) SubLingual five times a day PRN pain and or dyspnea and or distress -    Pertinent Labs: 10-19 Na143 mmol/L Glu 86 mg/dL K+ 4.5 mmol/L Cr  2.40 mg/dL<H> BUN 68 mg/dL<H> 10-16 Phos 5.7 mg/dL<H> 10-15 Alb 2.0 g/dL<L>      Skin: jean 12 no pressure injury    Estimated Needs:   [x ] no change since previous assessment  [ ] recalculated:     Previous Nutrition Diagnosis:   [ ] Inadequate Energy Intake [ ]Inadequate Oral Intake [ ] Excessive Energy Intake   [ ] Underweight [ ] Increased Nutrient Needs [ ] Overweight/Obesity   [ ] Altered GI Function [ ] Unintended Weight Loss [ ] Food & Nutrition Related Knowledge Deficit [x ] Malnutrition acute mild    Nutrition Diagnosis is [x ] ongoing  [ ] resolved [ ] not applicable     New Nutrition Diagnosis: [x ] not applicable       Interventions:   Recommend  [x ] Change Diet To: consider add low Phos diet restriction at this time   [ ] Nutrition Supplement  [ ] Nutrition Support  [x ] Other: continue diet as ordered with supplement . follow BUN Creat improved from initial assessment     Monitoring and Evaluation:   [x ] PO intake [ x ] Tolerance to diet prescription [ x ] weights [ x ] labs[ x ] follow up per protocol  [ ] other:

## 2019-10-19 NOTE — PROVIDER CONTACT NOTE (OTHER) - ACTION/TREATMENT ORDERED:
BMP stat, Mag stat, Phosphorous level stat, and 12 lead ECG
Per MD she said she will make Dr Matias aware and they will keep an eye on it. No other new orders at this time.
Per MD she will add orders for Rosas catheter no new orders at this time.
per MD arreaga for telephone orders for Compression Device. No other new orders.
Per MD no orders at this time. He will look into Cardiology.
per Dr Almonte, N pt is to be taking cipro daily not twice per day" same noted on d/c instructions at this time. pt's spouse & dtr aware and agreeable

## 2019-10-19 NOTE — PROVIDER CONTACT NOTE (OTHER) - ASSESSMENT
Rosas catheter irrigation orders .
increased INR  5.34 this shift. No anticoagulation at this time. pt has history of Afib.
pt asleep in bed at this time. Pt in Afib 112 with 4-5 beats Wide QRS complex per tele.  B/P 137/83 temp 97.5 rr 18 pulse ox 99 % on RA . No distress noted at this time.
/73 pulse rate 90's per Arlin in tele 98% on RA rr 18 . Pt daughter preferred me not trying to take pt temp at this time. Pt unable to keep probe under tongue. Pt sleeping in bed at this time. No  SOB or signs of symptoms of distress noted at this time.
pt being d/c to home

## 2019-10-19 NOTE — PROVIDER CONTACT NOTE (OTHER) - RECOMMENDATIONS
Cardiology consult
per Dr Almonte, N pt is to be taking cipro daily not twice per day" same noted on d/c instructions at this time. pt's spouse & dtr aware and agreeable

## 2019-10-19 NOTE — PROGRESS NOTE ADULT - SUBJECTIVE AND OBJECTIVE BOX
[facility] -- Alejandro Hernandez, Alvaro Oropeza, Caden Freedman Savella  Office # [phone]      Follow Up:  AFib, WCT    Subjective/Observations: Seen and examined.  Lying in bed flat arouses, not cooperative with exam and unable to answer question, but no signs of discomfort or distress.  No signs of orthopnea or PND.        REVIEW OF SYSTEMS: All other review of systems is negative unless indicated above    PAST MEDICAL & SURGICAL HISTORY:  Chronic kidney disease (CKD), stage IV (severe)  Prostate cancer: chronic vega  Hyperlipidemia  Hypertension  Atrial fibrillation  [place] (hard of hearing)  Bladder neoplasm  Congestive heart failure: hospitalized  once 12/2010  Acute congestive heart failure: 12/2010  Prostate cancer: 9/2011 - s/p external radiation treatments - last 12/9/11, s/p Bicalvutamide  Gout  Depression  Hypertension  Hyperlipidemia  Atrial Fibrillation: 1990  History of repair of hip fracture  Circumcision: age 5  History of Arthroscopy of Knee: left  S/P Cataract Extraction: LEFT  S/P Tonsillectomy      MEDICATIONS  (STANDING):  cholecalciferol 400 Unit(s) Oral daily  ciprofloxacin     Tablet 250 milliGRAM(s) Oral daily  dextrose 5% + sodium chloride 0.45%. 1000 milliLiter(s) (50 mL/Hr) IV Continuous <Continuous>  influenza   Vaccine 0.5 milliLiter(s) IntraMuscular once  melatonin 5 milliGRAM(s) Oral at bedtime  metoprolol succinate  milliGRAM(s) Oral daily    MEDICATIONS  (PRN):  morphine Concentrate 5 milliGRAM(s) SubLingual five times a day PRN pain and or dyspnea and or distress -      Allergies    Hay Fever (Rhinorrhea)  No Known Drug Allergies    Intolerances            Vital Signs Last 24 Hrs  T(C): 36.6 (19 Oct 2019 04:47), Max: 36.7 (18 Oct 2019 13:05)  T(F): 97.8 (19 Oct 2019 04:47), Max: 98.1 (18 Oct 2019 13:05)  HR: 114 (19 Oct 2019 04:47) (92 - 114)  BP: 107/74 (19 Oct 2019 04:47) (107/74 - 148/91)  BP(mean): --  RR: 19 (19 Oct 2019 04:47) (17 - 19)  SpO2: 100% (19 Oct 2019 04:47) (95% - 100%)    I&O's Summary    18 Oct 2019 07:01  -  19 Oct 2019 07:00  --------------------------------------------------------  IN: 700 mL / OUT: 1000 mL / NET: -300 mL          PHYSICAL EXAM:  TELE: Not on tele  Constitutional: NAD, arouses, frail, lethargic  HEENT: Moist Mucous Membranes, Anicteric  Pulmonary: Non-labored, breath sounds anteriorly, decreased in bases b/l.  No wheezing, rales or rhonchi  Cardiovascular: Irregularly irregular, S1 and S2, No murmurs, rubs, gallops or clicks  Gastrointestinal: Bowel Sounds present, soft, nontender.   Lymph: No peripheral edema. No lymphadenopathy.  Skin: No visible rashes or ulcers.  Psych:  Unable to assess    LABS: All Labs Reviewed:                        9.9    10.23 )-----------( 317      ( 19 Oct 2019 08:10 )             31.0                         9.8    8.84  )-----------( 279      ( 18 Oct 2019 08:23 )             30.1                         11.5   7.97  )-----------( 343      ( 17 Oct 2019 09:15 )             34.6     19 Oct 2019 08:10    143    |  114    |  68     ----------------------------<  86     4.5     |  21     |  2.40   18 Oct 2019 08:23    141    |  113    |  70     ----------------------------<  81     3.5     |  19     |  2.40   17 Oct 2019 09:15    139    |  110    |  78     ----------------------------<  89     3.8     |  19     |  2.80     Ca    8.6        19 Oct 2019 08:10  Ca    8.5        18 Oct 2019 08:23  Ca    8.8        17 Oct 2019 09:15      PT/INR - ( 19 Oct 2019 08:10 )   PT: 23.9 sec;   INR: 2.07 ratio         PTT - ( 19 Oct 2019 08:10 )  PTT:33.9 sec    < from: 12 Lead ECG (10.15.19 @ 22:18) >  Ventricular Rate 112 BPM    Atrial Rate 100 BPM    QRS Duration 102 ms    Q-T Interval 344 ms    QTC Calculation(Bezet) 469 ms    R Axis -64 degrees    T Axis 35 degrees    Diagnosis Line Atrial fibrillation with premature ventricular or aberrantlyconducted complexes  Left axis deviation  Abnormal ECG  No previous ECGs available  Confirmed by Davis Oropeza MD ([id number]) on 10/16/2019 10:48:48 AM    < end of copied text >      < from: TTE Echo Doppler w/o Cont (10.30.16 @ 10:33) >   EXAM:  ECHO TTE W/O CON COMP W/DOPPLR         PROCEDURE DATE:  10/30/2016        INTERPRETATION:  INDICATION: Atrial fibrillation    Blood Pressure 150/91    Height 165     Weight 61       BSA 1.67    Dimensions:    LA 4.7       Normal Values: 2.0- 4.0 cm    Ao 3.2        Normal Values: 2.0 - 3.8 cm  SEPTUM 1.2       Normal Values: 0.6 - 1.2 cm  PWT 1.2       Normal Values: 0.6 - 1.1 cm  LVIDd 5.5         Normal Values: 3.0 - 5.6 cm  LVIDs 4.8         Normal Values: 1.8 - 4.0 cm    Derived Variables:  LVMI     g/m2  RWT      Fractional Short      Ejection Fraction 15    Doppler Peak v. AoV=   (m/sec)    OBSERVATIONS:    Mitral Valve: normal, 2+ MR.  Aortic Valve/Aorta: Calcified trileaflet aortic valve.  Tricuspid Valve:  2-3+ TR.  Pulmonic Valve: 1+ AZ  Left Atrium: Enlarged  Right Atrium: normal  Left Ventricle: Mild concentric LVH, with internal dimensions at the   upper limits of normal. Severe global dysfunction, estimated LVEF of 15%.   All walls appear hypokinetic with minor regional variation.   Right Ventricle: normal size with mild systolic dysfunction.  Pericardium/Pleura: Bilateral pleural effusions, no significant   pericardial effusion. Dilated IVC  Pulmonary/RV Pressure: estimated PA systolic pressure of 65 mmHg assuming   an RA pressure of 10 mmHg., consistent with severe pulmonary hypertension  LV Diastolic Function:    Mild concentric LVH, with internal dimensions at the upper limits of   normal. Severe global dysfunction, estimated LVEF of 15%. All walls   appear hypokinetic with minor regional variation. The right ventricle   appears to be normal in size with mild systolic dysfunction. Left atrial   enlargement. The mitral valve appears structurally normal with 2+ MR.   Calcified aortic valve leaflets without stenosis. 1+ AZ. 2-3+ TR.   Estimated PA systolic pressure is 65 mmHg, assuming an RA pressure of 10   mmHg. No significant pericardial effusion. Bilateral pleural effusions.   The IVC appears dilated.                  ZACHARY SWENSON M.D., ATTENDING CARDIOLOGIST  This document has been electronically signed. Oct 31 2016  3:49PM    < end of copied text >     < from: [place] Modified Barium Swallow (10.18.19 @ 10:37) >  EXAM:  SWALLOWING FUNCTION W VIDEO                            PROCEDURE DATE:  10/18/2019          INTERPRETATION:  History: Dysphagia.    Modified barium swallow.    Impression: Fluoroscopy provided for modified barium swallow performed by   speech pathologist. Please refer to the speech pathology report for   diagnostic findings                MARGARITA TANNER M.D., ATTENDING RADIOLOGIST  This document has been electronically signed. Oct 18 2019 11:10AM                < end of copied text >
ANA BRENNER  89y  Male    Patient is a 89y old  Male who presents with a chief complaint of cough, weakness, urinary retention (18 Oct 2019 11:15)    family at bed side  feels better.     PAST MEDICAL & SURGICAL HISTORY:  Chronic kidney disease (CKD), stage IV (severe)  Prostate cancer: chronic vega  Hyperlipidemia  Hypertension  Atrial fibrillation  [place] (hard of hearing)  Bladder neoplasm  Congestive heart failure: hospitalized  once 12/2010  Acute congestive heart failure: 12/2010  Prostate cancer: 9/2011 - s/p external radiation treatments - last 12/9/11, s/p Bicalvutamide  Gout  Depression  Hypertension  Hyperlipidemia  Atrial Fibrillation: 1990  History of repair of hip fracture  Circumcision: age 5  History of Arthroscopy of Knee: left  S/P Cataract Extraction: LEFT  S/P Tonsillectomy          PHYSICAL EXAM:    T(C): 36.6 (10-18-19 @ 04:52), Max: 36.8 (10-17-19 @ 13:15)  HR: 94 (10-18-19 @ 04:52) (94 - 111)  BP: 135/82 (10-18-19 @ 04:52) (135/82 - 149/79)  RR: 18 (10-18-19 @ 04:52) (18 - 18)  SpO2: 97% (10-18-19 @ 04:52) (93% - 99%)  Wt(kg): --    I&O's Detail    17 Oct 2019 07:01  -  18 Oct 2019 07:00  --------------------------------------------------------  IN:  Total IN: 0 mL    OUT:    Indwelling Catheter - Urethral: 1200 mL  Total OUT: 1200 mL    Total NET: -1200 mL          Respiratory: clear anteriorly, decreased BS at bases  Cardiovascular: S1 S2  Gastrointestinal: soft NT ND +BS  Extremities: trace edema   Neuro: Awake and alert    MEDICATIONS  (STANDING):  cholecalciferol 400 Unit(s) Oral daily  dextrose 5% + sodium chloride 0.45%. 1000 milliLiter(s) (50 mL/Hr) IV Continuous <Continuous>  influenza   Vaccine 0.5 milliLiter(s) IntraMuscular once  metoprolol succinate  milliGRAM(s) Oral daily  piperacillin/tazobactam IVPB.. 3.375 Gram(s) IV Intermittent every 12 hours    MEDICATIONS  (PRN):  morphine Concentrate 5 milliGRAM(s) SubLingual five times a day PRN pain and or dyspnea and or distress -                            9.8    8.84  )-----------( 279      ( 18 Oct 2019 08:23 )             30.1       10-18    141  |  113<H>  |  70<H>  ----------------------------<  81  3.5   |  19<L>  |  2.40<H>    Ca    8.5      18 Oct 2019 08:23        Creatinine Trend: Creatinine Trend: 2.40<--, 2.80<--, 3.30<--, 3.40<--, 4.10<--, 4.60<--
Date/Time Patient Seen:  		  Referring MD:   Data Reviewed	       Patient is a 89y old  Male who presents with a chief complaint of cough, weakness, urinary retention (16 Oct 2019 22:39)      Subjective/HPI     PAST MEDICAL & SURGICAL HISTORY:  Chronic kidney disease (CKD), stage IV (severe)  Prostate cancer: chronic vega  Hyperlipidemia  Hypertension  Atrial fibrillation  [place] (hard of hearing)  Bladder neoplasm  Congestive heart failure: hospitalized  once 12/2010  Acute congestive heart failure: 12/2010  Prostate cancer: 9/2011 - s/p external radiation treatments - last 12/9/11, s/p Bicalvutamide  Gout  Depression  BPH (Benign Prostatic Hypertrophy)  Hypertension  Hyperlipidemia  Atrial Fibrillation: 1990  History of repair of hip fracture  Circumcision: age 5  History of Arthroscopy of Knee: left  S/P Cataract Extraction: LEFT  S/P Tonsillectomy        Medication list         MEDICATIONS  (STANDING):  azithromycin  IVPB 500 milliGRAM(s) IV Intermittent every 24 hours  cefTRIAXone   IVPB 1000 milliGRAM(s) IV Intermittent every 24 hours  cholecalciferol 400 Unit(s) Oral daily  dextrose 5% + sodium chloride 0.45%. 1000 milliLiter(s) (50 mL/Hr) IV Continuous <Continuous>  influenza   Vaccine 0.5 milliLiter(s) IntraMuscular once  metoprolol succinate ER 50 milliGRAM(s) Oral two times a day    MEDICATIONS  (PRN):         Vitals log        ICU Vital Signs Last 24 Hrs  T(C): 36.8 (17 Oct 2019 04:54), Max: 37 (16 Oct 2019 14:00)  T(F): 98.2 (17 Oct 2019 04:54), Max: 98.6 (16 Oct 2019 14:00)  HR: 103 (17 Oct 2019 04:54) (92 - 109)  BP: 141/68 (17 Oct 2019 04:54) (113/77 - 141/68)  BP(mean): --  ABP: --  ABP(mean): --  RR: 17 (17 Oct 2019 04:54) (17 - 18)  SpO2: 98% (17 Oct 2019 04:54) (95% - 100%)           Input and Output:  I&O's Detail    15 Oct 2019 07:01  -  16 Oct 2019 07:00  --------------------------------------------------------  IN:  Total IN: 0 mL    OUT:    Indwelling Catheter - Urethral: 1850 mL  Total OUT: 1850 mL    Total NET: -1850 mL      16 Oct 2019 07:01  -  17 Oct 2019 06:57  --------------------------------------------------------  IN:    dextrose 5% + sodium chloride 0.45%.: 450 mL  Total IN: 450 mL    OUT:    Indwelling Catheter - Urethral: 1400 mL  Total OUT: 1400 mL    Total NET: -950 mL          Lab Data                        10.2   10.77 )-----------( 288      ( 16 Oct 2019 08:15 )             30.8     10-16    139  |  111<H>  |  93<H>  ----------------------------<  69<L>  4.1   |  16<L>  |  3.30<H>    Ca    8.7      16 Oct 2019 08:15  Phos  5.7     10-16  Mg     2.2     10-16              Review of Systems	      Objective     Physical Examination    heart s1s2  lung dec BS      Pertinent Lab findings & Imaging      Alison:  NO   Adequate UO     I&O's Detail    15 Oct 2019 07:01  -  16 Oct 2019 07:00  --------------------------------------------------------  IN:  Total IN: 0 mL    OUT:    Indwelling Catheter - Urethral: 1850 mL  Total OUT: 1850 mL    Total NET: -1850 mL      16 Oct 2019 07:01  -  17 Oct 2019 06:57  --------------------------------------------------------  IN:    dextrose 5% + sodium chloride 0.45%.: 450 mL  Total IN: 450 mL    OUT:    Indwelling Catheter - Urethral: 1400 mL  Total OUT: 1400 mL    Total NET: -950 mL               Discussed with:     Cultures:	        Radiology
Date/Time Patient Seen:  		  Referring MD:   Data Reviewed	       Patient is a 89y old  Male who presents with a chief complaint of cough, weakness, urinary retention (17 Oct 2019 12:31)      Subjective/HPI     PAST MEDICAL & SURGICAL HISTORY:  Chronic kidney disease (CKD), stage IV (severe)  Prostate cancer: chronic vega  Hyperlipidemia  Hypertension  Atrial fibrillation  [place] (hard of hearing)  Bladder neoplasm  Congestive heart failure: hospitalized  once 12/2010  Acute congestive heart failure: 12/2010  Prostate cancer: 9/2011 - s/p external radiation treatments - last 12/9/11, s/p Bicalvutamide  Gout  Depression  BPH (Benign Prostatic Hypertrophy)  Hypertension  Hyperlipidemia  Atrial Fibrillation: 1990  History of repair of hip fracture  Circumcision: age 5  History of Arthroscopy of Knee: left  S/P Cataract Extraction: LEFT  S/P Tonsillectomy        Medication list         MEDICATIONS  (STANDING):  azithromycin  IVPB 500 milliGRAM(s) IV Intermittent every 24 hours  cefTRIAXone   IVPB 1000 milliGRAM(s) IV Intermittent every 24 hours  cholecalciferol 400 Unit(s) Oral daily  dextrose 5% + sodium chloride 0.45%. 1000 milliLiter(s) (50 mL/Hr) IV Continuous <Continuous>  influenza   Vaccine 0.5 milliLiter(s) IntraMuscular once  metoprolol succinate ER 50 milliGRAM(s) Oral two times a day    MEDICATIONS  (PRN):  morphine Concentrate 5 milliGRAM(s) SubLingual five times a day PRN pain and or dyspnea and or distress -         Vitals log        ICU Vital Signs Last 24 Hrs  T(C): 36.6 (18 Oct 2019 04:52), Max: 36.8 (17 Oct 2019 13:15)  T(F): 97.9 (18 Oct 2019 04:52), Max: 98.2 (17 Oct 2019 13:15)  HR: 94 (18 Oct 2019 04:52) (94 - 116)  BP: 135/82 (18 Oct 2019 04:52) (135/82 - 149/79)  BP(mean): --  ABP: --  ABP(mean): --  RR: 18 (18 Oct 2019 04:52) (18 - 18)  SpO2: 97% (18 Oct 2019 04:52) (93% - 99%)           Input and Output:  I&O's Detail    16 Oct 2019 07:01  -  17 Oct 2019 07:00  --------------------------------------------------------  IN:    dextrose 5% + sodium chloride 0.45%.: 450 mL  Total IN: 450 mL    OUT:    Indwelling Catheter - Urethral: 1400 mL  Total OUT: 1400 mL    Total NET: -950 mL      17 Oct 2019 07:01  -  18 Oct 2019 06:41  --------------------------------------------------------  IN:  Total IN: 0 mL    OUT:    Indwelling Catheter - Urethral: 1200 mL  Total OUT: 1200 mL    Total NET: -1200 mL          Lab Data                        11.5   7.97  )-----------( 343      ( 17 Oct 2019 09:15 )             34.6     10-17    139  |  110<H>  |  78<H>  ----------------------------<  89  3.8   |  19<L>  |  2.80<H>    Ca    8.8      17 Oct 2019 09:15  Phos  5.7     10-16  Mg     2.2     10-16              Review of Systems	      Objective     Physical Examination    heart s1s2  lung dec BS  abd soft      Pertinent Lab findings & Imaging      Alison:  NO   Adequate UO     I&O's Detail    16 Oct 2019 07:01  -  17 Oct 2019 07:00  --------------------------------------------------------  IN:    dextrose 5% + sodium chloride 0.45%.: 450 mL  Total IN: 450 mL    OUT:    Indwelling Catheter - Urethral: 1400 mL  Total OUT: 1400 mL    Total NET: -950 mL      17 Oct 2019 07:01  -  18 Oct 2019 06:41  --------------------------------------------------------  IN:  Total IN: 0 mL    OUT:    Indwelling Catheter - Urethral: 1200 mL  Total OUT: 1200 mL    Total NET: -1200 mL               Discussed with:     Cultures:	        Radiology
HISTORY OF PRESENT ILLNESS:    PAST MEDICAL & SURGICAL HISTORY:  Chronic kidney disease (CKD), stage IV (severe)  Prostate cancer: chronic vega  Hyperlipidemia  Hypertension  Atrial fibrillation  [place] (hard of hearing)  Bladder neoplasm  Congestive heart failure: hospitalized  once 2010  Acute congestive heart failure: 2010  Prostate cancer: 2011 - s/p external radiation treatments - last 11, s/p Bicalvutamide  Gout  Depression  Hypertension  Hyperlipidemia  Atrial Fibrillation:   History of repair of hip fracture  Circumcision: age 5  History of Arthroscopy of Knee: left  S/P Cataract Extraction: LEFT  S/P Tonsillectomy      REVIEW OF SYSTEMS:    MEDICATIONS  (STANDING):  azithromycin  IVPB 500 milliGRAM(s) IV Intermittent every 24 hours  cefTRIAXone   IVPB 1000 milliGRAM(s) IV Intermittent every 24 hours  cholecalciferol 400 Unit(s) Oral daily  influenza   Vaccine 0.5 milliLiter(s) IntraMuscular once  metoprolol succinate ER 50 milliGRAM(s) Oral two times a day    MEDICATIONS  (PRN):      PE: Urine cloudy via vega, less purulent than reported last night    Allergies    Hay Fever (Rhinorrhea)  No Known Drug Allergies    Intolerances        FAMILY HISTORY:  Family history of cancer      Vital Signs Last 24 Hrs  T(C): 36.4 (15 Oct 2019 05:40), Max: 37.9 (14 Oct 2019 20:38)  T(F): 97.5 (15 Oct 2019 05:40), Max: 100.3 (14 Oct 2019 20:38)  HR: 103 (15 Oct 2019 07:39) (98 - 110)  BP: 137/83 (15 Oct 2019 05:40) (136/69 - 157/71)  BP(mean): --  RR: 18 (15 Oct 2019 05:40) (18 - 20)  SpO2: 100% (15 Oct 2019 06:31) (96% - 100%)        LABS:                        11.2   21.72 )-----------( 308      ( 15 Oct 2019 04:05 )             33.3     1015    131<L>  |  102  |  105<H>  ----------------------------<  114<H>  5.0   |  16<L>  |  4.10<H>    Ca    8.2<L>      15 Oct 2019 04:05  Phos  5.4     10-15  Mg     2.1     10-15    TPro  6.3  /  Alb  2.0<L>  /  TBili  0.3  /  DBili  x   /  AST  63<H>  /  ALT  35  /  AlkPhos  141<H>  10-15    PT/INR - ( 15 Oct 2019 02:59 )   PT: 58.7 sec;   INR: 4.90 ratio           Urinalysis Basic - ( 15 Oct 2019 01:19 )    Color: Pale Yellow / Appearance: Turbid / S.015 / pH: x  Gluc: x / Ketone: Negative  / Bili: Negative / Urobili: Negative   Blood: x / Protein: 150 mg/dL / Nitrite: Negative   Leuk Esterase: Moderate / RBC: 6-10 /HPF / WBC 11-25   Sq Epi: x / Non Sq Epi: Occasional / Bacteria: Many      Urine Culture:     RADIOLOGY & ADDITIONAL STUDIES:
INTERVAL HPI/OVERNIGHT EVENTS: afebrile, labs improving    MEDICATIONS  (STANDING):  azithromycin  IVPB 500 milliGRAM(s) IV Intermittent every 24 hours  cefTRIAXone   IVPB 1000 milliGRAM(s) IV Intermittent every 24 hours  cholecalciferol 400 Unit(s) Oral daily  influenza   Vaccine 0.5 milliLiter(s) IntraMuscular once  metoprolol succinate ER 50 milliGRAM(s) Oral two times a day    MEDICATIONS  (PRN):        Vital Signs Last 24 Hrs  T(C): 36.4 (16 Oct 2019 04:30), Max: 36.4 (15 Oct 2019 16:32)  T(F): 97.6 (16 Oct 2019 04:30), Max: 97.6 (16 Oct 2019 04:30)  HR: 109 (16 Oct 2019 07:52) (84 - 112)  BP: 118/73 (16 Oct 2019 04:30) (118/73 - 148/77)  BP(mean): --  RR: 19 (16 Oct 2019 04:30) (18 - 19)  SpO2: 97% (16 Oct 2019 07:52) (96% - 99%)    PHYSICAL EXAM:      GENITALIA: urine clear via vega    LABS:                        10.2   10.77 )-----------( 288      ( 16 Oct 2019 08:15 )             30.8     10-16    139  |  111<H>  |  93<H>  ----------------------------<  69<L>  4.1   |  16<L>  |  3.30<H>    Ca    8.7      16 Oct 2019 08:15  Phos  5.7     10-16  Mg     2.2     10-16    TPro  6.3  /  Alb  2.0<L>  /  TBili  0.3  /  DBili  x   /  AST  63<H>  /  ALT  35  /  AlkPhos  141<H>  10-15    PT/INR - ( 16 Oct 2019 08:15 )   PT: 80.8 sec;   INR: 6.68 ratio         PTT - ( 16 Oct 2019 08:15 )  PTT:47.6 sec  Urinalysis Basic - ( 15 Oct 2019 01:19 )    Color: Pale Yellow / Appearance: Turbid / S.015 / pH: x  Gluc: x / Ketone: Negative  / Bili: Negative / Urobili: Negative   Blood: x / Protein: 150 mg/dL / Nitrite: Negative   Leuk Esterase: Moderate / RBC: 6-10 /HPF / WBC 11-25   Sq Epi: x / Non Sq Epi: Occasional / Bacteria: Many      Urine culture:  10-14 @ 22:58 --   No growth to date.      RADIOLOGY & ADDITIONAL TESTS:
[facility] -- Alejandro Hernandez Grossman, [place], Pannella, Patel, Savella, Goodger  Office # [phone]    Follow Up:  Afib, WCT    Subjective/Observations: Seen and evaluated, on RA with no discomfort.  Unable to answer questions appropriately but not in ay kind of discomfort.    REVIEW OF SYSTEMS: All other review of systems is negative unless indicated above  PAST MEDICAL & SURGICAL HISTORY:  Chronic kidney disease (CKD), stage IV (severe)  Prostate cancer: chronic vega  Hyperlipidemia  Hypertension  Atrial fibrillation  [place] (hard of hearing)  Bladder neoplasm  Congestive heart failure: hospitalized  once 12/2010  Acute congestive heart failure: 12/2010  Prostate cancer: 9/2011 - s/p external radiation treatments - last 12/9/11, s/p Bicalvutamide  Gout  Depression  Hypertension  Hyperlipidemia  Atrial Fibrillation: 1990  History of repair of hip fracture  Circumcision: age 5  History of Arthroscopy of Knee: left  S/P Cataract Extraction: LEFT  S/P Tonsillectomy    MEDICATIONS  (STANDING):  azithromycin  IVPB 500 milliGRAM(s) IV Intermittent every 24 hours  cefTRIAXone   IVPB 1000 milliGRAM(s) IV Intermittent every 24 hours  cholecalciferol 400 Unit(s) Oral daily  dextrose 5% + sodium chloride 0.45%. 1000 milliLiter(s) (50 mL/Hr) IV Continuous <Continuous>  influenza   Vaccine 0.5 milliLiter(s) IntraMuscular once  metoprolol succinate ER 50 milliGRAM(s) Oral two times a day    MEDICATIONS  (PRN):  morphine Concentrate 5 milliGRAM(s) SubLingual five times a day PRN pain and or dyspnea and or distress -    Allergies    Hay Fever (Rhinorrhea)  No Known Drug Allergies    Intolerances    Vital Signs Last 24 Hrs  T(C): 36.6 (18 Oct 2019 04:52), Max: 36.8 (17 Oct 2019 13:15)  T(F): 97.9 (18 Oct 2019 04:52), Max: 98.2 (17 Oct 2019 13:15)  HR: 94 (18 Oct 2019 04:52) (94 - 111)  BP: 135/82 (18 Oct 2019 04:52) (135/82 - 149/79)  BP(mean): --  RR: 18 (18 Oct 2019 04:52) (18 - 18)  SpO2: 97% (18 Oct 2019 04:52) (93% - 99%)  I&O's Summary    17 Oct 2019 07:01  -  18 Oct 2019 07:00  --------------------------------------------------------  IN: 0 mL / OUT: 1200 mL / NET: -1200 mL    PHYSICAL EXAM:  TELE: Not on tele  Constitutional: NAD, awake and alert, well-developed.   HEENT: Moist Mucous Membranes, Anicteric.  [place]  Pulmonary: Non-labored, breath sounds are clear bilaterally, No wheezing, rales or rhonchi  Cardiovascular: Irregularly irregular, S1 and S2, + murmurs>  No rubs, gallops or clicks  Gastrointestinal: Bowel Sounds present, soft, nontender.   Lymph: No peripheral edema. No lymphadenopathy.  Skin: No visible rashes or ulcers.  + ecchymoses BUE  Psych:  Mood & affect flat.  LABS: All Labs Reviewed:                        9.8    8.84  )-----------( 279      ( 18 Oct 2019 08:23 )             30.1                         11.5   7.97  )-----------( 343      ( 17 Oct 2019 09:15 )             34.6                         10.2   10.77 )-----------( 288      ( 16 Oct 2019 08:15 )             30.8     18 Oct 2019 08:23    141    |  113    |  70     ----------------------------<  81     3.5     |  19     |  2.40   17 Oct 2019 09:15    139    |  110    |  78     ----------------------------<  89     3.8     |  19     |  2.80   16 Oct 2019 08:15    139    |  111    |  93     ----------------------------<  69     4.1     |  16     |  3.30     Ca    8.5        18 Oct 2019 08:23  Ca    8.8        17 Oct 2019 09:15  Ca    8.7        16 Oct 2019 08:15  Phos  5.7       16 Oct 2019 08:15  Phos  5.5       15 Oct 2019 22:24  Mg     2.2       16 Oct 2019 08:15  Mg     2.2       15 Oct 2019 22:24    PT/INR - ( 18 Oct 2019 08:23 )   PT: 23.4 sec;   INR: 2.01 ratio    PTT - ( 18 Oct 2019 08:23 )  PTT:34.4 sec      < from: TTE Echo Doppler w/o Cont (10.30.16 @ 10:33) >     EXAM:  ECHO TTE W/O CON COMP W/DOPPLR         PROCEDURE DATE:  10/30/2016        INTERPRETATION:  INDICATION: Atrial fibrillation    Blood Pressure 150/91    Height 165     Weight 61       BSA 1.67    Dimensions:    LA 4.7       Normal Values: 2.0- 4.0 cm    Ao 3.2        Normal Values: 2.0 - 3.8 cm  SEPTUM 1.2       Normal Values: 0.6 - 1.2 cm  PWT 1.2       Normal Values: 0.6 - 1.1 cm  LVIDd 5.5         Normal Values: 3.0 - 5.6 cm  LVIDs 4.8         Normal Values: 1.8 - 4.0 cm    Derived Variables:  LVMI     g/m2  RWT      Fractional Short      Ejection Fraction 15    Doppler Peak v. AoV=   (m/sec)    OBSERVATIONS:    Mitral Valve: normal, 2+ MR.  Aortic Valve/Aorta: Calcified trileaflet aortic valve.  Tricuspid Valve:  2-3+ TR.  Pulmonic Valve: 1+ IN  Left Atrium: Enlarged  Right Atrium: normal  Left Ventricle: Mild concentric LVH, with internal dimensions at the   upper limits of normal. Severe global dysfunction, estimated LVEF of 15%.   All walls appear hypokinetic with minor regional variation.   Right Ventricle: normal size with mild systolic dysfunction.  Pericardium/Pleura: Bilateral pleural effusions, no significant   pericardial effusion. Dilated IVC  Pulmonary/RV Pressure: estimated PA systolic pressure of 65 mmHg assuming   an RA pressure of 10 mmHg., consistent with severe pulmonary hypertension  LV Diastolic Function:    Mild concentric LVH, with internal dimensions at the upper limits of   normal. Severe global dysfunction, estimated LVEF of 15%. All walls   appear hypokinetic with minor regional variation. The right ventricle   appears to be normal in size with mild systolic dysfunction. Left atrial   enlargement. The mitral valve appears structurally normal with 2+ MR.   Calcified aortic valve leaflets without stenosis. 1+ IN. 2-3+ TR.   Estimated PA systolic pressure is 65 mmHg, assuming an RA pressure of 10   mmHg. No significant pericardial effusion. Bilateral pleural effusions.   The IVC appears dilated.      ZACHARY SWENSON M.D., ATTENDING CARDIOLOGIST  This document has been electronically signed. Oct 31 2016  3:49PM      < end of copied text >    < from: Xray Chest 1 View AP/PA (10.14.19 @ 21:55) >    EXAM:  XR CHEST AP OR PA 1V                          PROCEDURE DATE:  10/14/2019      INTERPRETATION:  Clinical information: Fever.    Technique: Frontal view of the chest.    Comparison: Prior chest x-ray examination from 9/8/2017.    Findings: Small layering bilateral pleural effusions are noted. Adjacent   atelectasis and/or pneumonia is notable on the left. The heart size is at   the upper limits of normal. Multilevel degenerative changes are noted   within the imaged potions of the spine.    IMPRESSION: Small bilateral pleural effusions with adjacent atelectasis   and/or pneumonia on the left.    MIKI NICOLE M.D., ATTENDING RADIOLOGIST  This document has been electronically signed. Oct 14 2019 10:08PM     < end of copied text >    < from: 12 Lead ECG (10.15.19 @ 22:18) >    Ventricular Rate 112 BPM    Atrial Rate 100 BPM    QRS Duration 102 ms    Q-T Interval 344 ms    QTC Calculation(Bezet) 469 ms    R Axis -64 degrees    T Axis 35 degrees    Diagnosis Line Atrial fibrillation with premature ventricular or aberrantlyconducted complexes  Left axis deviation  Abnormal ECG  No previous ECGs available  Confirmed by Davis Oropeza MD ([id number]) on 10/16/2019 10:48:48 AM    < end of copied text >
Patient is a 89y old  Male who presents with a chief complaint of cough, weakness, urinary retention (15 Oct 2019 08:10)    ----  INTERVAL HPI/OVERNIGHT EVENTS: Pt seen and evaluated at the bedside, resting. No acute overnight events occurred. History obtained from wife due to patients altered mental status. Reports patient's mental status declined significantly within the past 2 weeks and correlates it to the infection. Denies BM since yesterday. Denies CP/SOB/fevers/chills.     ----  REVIEW OF SYSTEMS:  unable to obtain due to altered mental status.    ----  PHYSICAL EXAM:  GENERAL: no acute distress, somnolent but arousable,   EYES: sclera clear, no exudates  ENMT: oropharynx clear without erythema, moist mucous membranes  NECK: supple, soft, no thyromegaly noted  LUNGS: good air entry bilaterally, clear to auscultation, symmetric breath sounds, no wheezing or rhonchi appreciated  HEART: soft S1/S2, regular rate and rhythm, no murmurs noted, no noted edema to b/l LE  GASTROINTESTINAL: abdomen is soft, nontender, nondistended, normoactive bowel sounds, no palpable masses  INTEGUMENT: good skin turgor, appropriate for ethnicity, appears well perfused, no jaundice noted  MUSCULOSKELETAL: no clubbing or cyanosis, no obvious deformity  NEUROLOGIC: good muscle tone in 4 extremities  PSYCHIATRIC: altered mental status  HEME/LYMPH: no palpable supraclavicular nodules, no obvious ecchymosis   : + vega w/ turbid/cloudy yellow urine    Vital Signs Last 24 Hrs  T(C): 36.4 (15 Oct 2019 05:40), Max: 37.9 (14 Oct 2019 20:38)  T(F): 97.5 (15 Oct 2019 05:40), Max: 100.3 (14 Oct 2019 20:38)  HR: 103 (15 Oct 2019 07:39) (98 - 110)  BP: 137/83 (15 Oct 2019 05:40) (136/69 - 157/71)  BP(mean): --  RR: 18 (15 Oct 2019 05:40) (18 - 20)  SpO2: 100% (15 Oct 2019 06:31) (96% - 100%)    ----  I&O's Summary    14 Oct 2019 07:01  -  15 Oct 2019 07:00  --------------------------------------------------------  IN: 0 mL / OUT: 500 mL / NET: -500 mL        LABS:                        11.2   21.72 )-----------( 308      ( 15 Oct 2019 04:05 )             33.3     10-15    131<L>  |  102  |  105<H>  ----------------------------<  114<H>  5.0   |  16<L>  |  4.10<H>    Ca    8.2<L>      15 Oct 2019 04:05  Phos  5.4     10-15  Mg     2.1     10-15    TPro  6.3  /  Alb  2.0<L>  /  TBili  0.3  /  DBili  x   /  AST  63<H>  /  ALT  35  /  AlkPhos  141<H>  10-15  PT/INR - ( 15 Oct 2019 07:48 )   PT: 63.5 sec;   INR: 5.34 ratio    PTT - ( 15 Oct 2019 07:48 )  PTT:47.6 sec  Urinalysis Basic - ( 15 Oct 2019 01:19 )    Color: Pale Yellow / Appearance: Turbid / S.015 / pH: x  Gluc: x / Ketone: Negative  / Bili: Negative / Urobili: Negative   Blood: x / Protein: 150 mg/dL / Nitrite: Negative   Leuk Esterase: Moderate / RBC: 6-10 /HPF / WBC 11-25   Sq Epi: x / Non Sq Epi: Occasional / Bacteria: Many
Patient is a 89y old  Male who presents with a chief complaint of cough, weakness, urinary retention (16 Oct 2019 09:50)      Patient seen in follow up for KAILEE. Pt resting in bed.     PAST MEDICAL HISTORY:  Chronic kidney disease (CKD), stage IV (severe)  Prostate cancer  Hyperlipidemia  Hypertension  Atrial fibrillation  [place] (hard of hearing)  Bladder neoplasm  Congestive heart failure  Acute congestive heart failure  Prostate cancer  Gout  Depression  BPH (Benign Prostatic Hypertrophy)  Hypertension  Hyperlipidemia  Atrial Fibrillation    MEDICATIONS  (STANDING):  azithromycin  IVPB 500 milliGRAM(s) IV Intermittent every 24 hours  cefTRIAXone   IVPB 1000 milliGRAM(s) IV Intermittent every 24 hours  cholecalciferol 400 Unit(s) Oral daily  influenza   Vaccine 0.5 milliLiter(s) IntraMuscular once  metoprolol succinate ER 50 milliGRAM(s) Oral two times a day    MEDICATIONS  (PRN):    T(C): 36.4 (10-16-19 @ 04:30), Max: 36.7 (10-15-19 @ 04:04)  HR: 109 (10-16-19 @ 07:52) (84 - 112)  BP: 118/73 (10-16-19 @ 04:30) (118/73 - 148/77)  RR: 19 (10-16-19 @ 04:30) (18 - 20)  SpO2: 97% (10-16-19 @ 07:52) (96% - 100%)  Wt(kg): --  I&O's Detail    15 Oct 2019 07:01  -  16 Oct 2019 07:00  --------------------------------------------------------  IN:  Total IN: 0 mL    OUT:    Indwelling Catheter - Urethral: 1850 mL  Total OUT: 1850 mL    Total NET: -1850 mL          PHYSICAL EXAM:  General: NAD  Respiratory: b/l air entry  Cardiovascular: S1 S2  Gastrointestinal: soft  Extremities:  no edema                          10.2   10.77 )-----------( 288      ( 16 Oct 2019 08:15 )             30.8     10-16    139  |  111<H>  |  93<H>  ----------------------------<  69<L>  4.1   |  16<L>  |  3.30<H>    Ca    8.7      16 Oct 2019 08:15  Phos  5.7     10-16  Mg     2.2     10-16    TPro  6.3  /  Alb  2.0<L>  /  TBili  0.3  /  DBili  x   /  AST  63<H>  /  ALT  35  /  AlkPhos  141<H>  10-15        LIVER FUNCTIONS - ( 15 Oct 2019 04:05 )  Alb: 2.0 g/dL / Pro: 6.3 g/dL / ALK PHOS: 141 U/L / ALT: 35 U/L / AST: 63 U/L / GGT: x           Urinalysis Basic - ( 15 Oct 2019 01:19 )    Color: Pale Yellow / Appearance: Turbid / S.015 / pH: x  Gluc: x / Ketone: Negative  / Bili: Negative / Urobili: Negative   Blood: x / Protein: 150 mg/dL / Nitrite: Negative   Leuk Esterase: Moderate / RBC: 6-10 /HPF / WBC 11-25   Sq Epi: x / Non Sq Epi: Occasional / Bacteria: Many        Sodium, Serum: 139 (10-16 @ 08:15)  Sodium, Serum: 137 (10-15 @ 22:24)  Sodium, Serum: 131 (10-15 @ 04:05)  Sodium, Serum: 127 (10-14 @ 20:48)    Creatinine, Serum: 3.30 (10-16 @ 08:15)  Creatinine, Serum: 3.40 (10-15 @ 22:24)  Creatinine, Serum: 4.10 (10-15 @ 04:05)  Creatinine, Serum: 4.60 (10-14 @ 20:48)    Potassium, Serum: 4.1 (10-16 @ 08:15)  Potassium, Serum: 4.4 (10-15 @ 22:24)  Potassium, Serum: 5.0 (10-15 @ 04:05)  Potassium, Serum: 5.3 (10-14 @ 20:48)    Hemoglobin: 10.2 (10-16 @ 08:15)  Hemoglobin: 11.2 (10-15 @ 04:05)  Hemoglobin: 11.7 (10-14 @ 20:48)
Patient is a 89y old  Male who presents with a chief complaint of cough, weakness, urinary retention (16 Oct 2019 09:50)  Patient seen in follow up for KAILEE.     PAST MEDICAL HISTORY:  Chronic kidney disease (CKD), stage IV (severe)  Prostate cancer  Hyperlipidemia  Hypertension  Atrial fibrillation  [place] (hard of hearing)  Bladder neoplasm  Congestive heart failure  Acute congestive heart failure  Prostate cancer  Gout  Depression  BPH (Benign Prostatic Hypertrophy)  Hypertension  Hyperlipidemia  Atrial Fibrillation    MEDICATIONS  (STANDING):  azithromycin  IVPB 500 milliGRAM(s) IV Intermittent every 24 hours  cefTRIAXone   IVPB 1000 milliGRAM(s) IV Intermittent every 24 hours  cholecalciferol 400 Unit(s) Oral daily  dextrose 5% + sodium chloride 0.45%. 1000 milliLiter(s) (50 mL/Hr) IV Continuous <Continuous>  influenza   Vaccine 0.5 milliLiter(s) IntraMuscular once  metoprolol succinate ER 50 milliGRAM(s) Oral two times a day    MEDICATIONS  (PRN):  morphine Concentrate 5 milliGRAM(s) SubLingual five times a day PRN pain and or dyspnea and or distress -    T(C): 36.8 (10-17-19 @ 04:54), Max: 37 (10-16-19 @ 14:00)  HR: 116 (10-17-19 @ 07:54) (84 - 116)  BP: 141/68 (10-17-19 @ 04:54) (113/77 - 148/77)  RR: 17 (10-17-19 @ 04:54)  SpO2: 99% (10-17-19 @ 07:54)  Wt(kg): --  I&O's Detail    16 Oct 2019 07:01  -  17 Oct 2019 07:00  --------------------------------------------------------  IN:    dextrose 5% + sodium chloride 0.45%.: 450 mL  Total IN: 450 mL    OUT:    Indwelling Catheter - Urethral: 1400 mL  Total OUT: 1400 mL    Total NET: -950 mL      PHYSICAL EXAM:  General: NAD  Respiratory: b/l air entry  Cardiovascular: S1 S2  Gastrointestinal: soft  Extremities:  no edema                         LABORATORY:                        11.5   7.97  )-----------( 343      ( 17 Oct 2019 09:15 )             34.6     10-17    139  |  110<H>  |  78<H>  ----------------------------<  89  3.8   |  19<L>  |  2.80<H>    Ca    8.8      17 Oct 2019 09:15  Phos  5.7     10-16  Mg     2.2     10-16      Sodium, Serum: 139 mmol/L (10-17 @ 09:15)  Sodium, Serum: 139 mmol/L (10-16 @ 08:15)  Sodium, Serum: 137 mmol/L (10-15 @ 22:24)    Potassium, Serum: 3.8 mmol/L (10-17 @ 09:15)  Potassium, Serum: 4.1 mmol/L (10-16 @ 08:15)  Potassium, Serum: 4.4 mmol/L (10-15 @ 22:24)    Hemoglobin: 11.5 g/dL (10-17 @ 09:15)  Hemoglobin: 10.2 g/dL (10-16 @ 08:15)  Hemoglobin: 11.2 g/dL (10-15 @ 04:05)  Hemoglobin: 11.7 g/dL (10-14 @ 20:48)    Creatinine, Serum 2.80 (10-17 @ 09:15)  Creatinine, Serum 3.30 (10-16 @ 08:15)  Creatinine, Serum 3.40 (10-15 @ 22:24)  Creatinine, Serum 4.10 (10-15 @ 04:05)
Patient is a 89y old  Male who presents with a chief complaint of cough, weakness, urinary retention (16 Oct 2019 10:51)    ----  INTERVAL HPI/OVERNIGHT EVENTS: Pt seen and evaluated at the bedside. No acute overnight events occurred. Patient denies any acute complaints. Patient is still a poor historian, but is able to communicate better today and follow simple commands.   ----  REVIEW OF SYSTEMS:  Unable to obtain due to altered mental status/lethargy.    ----  PHYSICAL EXAM:  GENERAL: patient appears well, no acute distress, able to follow simple commands  EYES: sclera clear, no exudates  ENMT: oropharynx clear without erythema  NECK: supple, soft, no thyromegaly noted  LUNGS: adequate air entry bilaterally, clear to auscultation, symmetric breath sounds, no wheezing or rhonchi appreciated  HEART: soft S1/S2, irregular rate and rhythm consistent with A. fib, no murmurs noted, no noted edema to b/l LE  GASTROINTESTINAL: abdomen is soft, nontender, nondistended, normoactive bowel sounds, no palpable masses  : + Rosas, draining cloudy urine  INTEGUMENT: good skin turgor, appropriate for ethnicity, appears well perfused, no jaundice noted  MUSCULOSKELETAL: no clubbing or cyanosis, no obvious deformity  HEME/LYMPH: no palpable supraclavicular nodules, no obvious ecchymosis     Vital Signs Last 24 Hrs  T(C): 36.8 (17 Oct 2019 04:54), Max: 37 (16 Oct 2019 14:00)  T(F): 98.2 (17 Oct 2019 04:54), Max: 98.6 (16 Oct 2019 14:00)  HR: 116 (17 Oct 2019 07:54) (92 - 116)  BP: 141/68 (17 Oct 2019 04:54) (113/77 - 141/68)  BP(mean): --  RR: 17 (17 Oct 2019 04:54) (17 - 18)  SpO2: 99% (17 Oct 2019 07:54) (95% - 100%)          LABS:                         11.5   7.97  )-----------( 343      ( 17 Oct 2019 09:15 )             34.6   10-17    139  |  110<H>  |  78<H>  ----------------------------<  89  3.8   |  19<L>  |  2.80<H>        Ca    8.8      17 Oct 2019 09:15  Phos  5.7     10-16  Mg     2.2     10-16
Patient is a 89y old  Male who presents with a chief complaint of cough, weakness, urinary retention (16 Oct 2019 10:51)    ----  INTERVAL HPI/OVERNIGHT EVENTS: Pt seen and evaluated at the bedside. No acute overnight events occurred. Patient denies any acute complaints. Patient is still a poor historian, but is able to communicate better today and follow simple commands.   ----  REVIEW OF SYSTEMS:  Unable to obtain due to altered mental status/lethargy.    ----  PHYSICAL EXAM:  GENERAL: patient appears well, no acute distress, able to follow simple commands  EYES: sclera clear, no exudates  ENMT: oropharynx clear without erythema  NECK: supple, soft, no thyromegaly noted  LUNGS: adequate air entry bilaterally, clear to auscultation, symmetric breath sounds, no wheezing or rhonchi appreciated  HEART: soft S1/S2, irregular rate and rhythm consistent with A. fib, no murmurs noted, no noted edema to b/l LE  GASTROINTESTINAL: abdomen is soft, nontender, nondistended, normoactive bowel sounds, no palpable masses  : + Rosas, draining cloudy urine, clearing up  INTEGUMENT: good skin turgor, appropriate for ethnicity, appears well perfused, no jaundice noted  MUSCULOSKELETAL: no clubbing or cyanosis, no obvious deformity  HEME/LYMPH: no palpable supraclavicular nodules, no obvious ecchymosis     Vital Signs Last 24 Hrs  T(C): 36.6 (18 Oct 2019 04:52), Max: 36.8 (17 Oct 2019 13:15)  T(F): 97.9 (18 Oct 2019 04:52), Max: 98.2 (17 Oct 2019 13:15)  HR: 94 (18 Oct 2019 04:52) (94 - 111)  BP: 135/82 (18 Oct 2019 04:52) (135/82 - 149/79)  RR: 18 (18 Oct 2019 04:52) (18 - 18)  SpO2: 97% (18 Oct 2019 04:52) (93% - 99%)      LABS:                        9.8    8.84  )-----------( 279      ( 18 Oct 2019 08:23 )             30.1   10-18    141  |  113<H>  |  70<H>  ----------------------------<  81  3.5   |  19<L>  |  2.40<H>    Ca    8.5      18 Oct 2019 08:23
Patient is a 89y old  Male who presents with a chief complaint of cough, weakness, urinary retention (16 Oct 2019 10:51)    ----  INTERVAL HPI/OVERNIGHT EVENTS: Pt seen and evaluated at the bedside. No acute overnight events occurred. Patient is a poor historian due to altered mental status. Patient has cloudy, blood-tinged urine in vega bag.    ----  REVIEW OF SYSTEMS:  Unable to obtain due to altered mental status/lethargy.    ----  PHYSICAL EXAM:  GENERAL: patient appears well, no acute distress, difficult to arouse and communicate  EYES: sclera clear, no exudates  ENMT: oropharynx clear without erythema  NECK: supple, soft, no thyromegaly noted  LUNGS: adequate air entry bilaterally, mild crackles at the lung bases, symmetric breath sounds, no wheezing or rhonchi appreciated  HEART: soft S1/S2, irregular rate and rhythm consistent with A. fib, no murmurs noted, no noted edema to b/l LE  GASTROINTESTINAL: abdomen is soft, nontender, nondistended, normoactive bowel sounds, no palpable masses  : + [place], draining cloudy, blood-tinged urine  INTEGUMENT: good skin turgor, appropriate for ethnicity, appears well perfused, no jaundice noted  MUSCULOSKELETAL: no clubbing or cyanosis, no obvious deformity  HEME/LYMPH: no palpable supraclavicular nodules, no obvious ecchymosis     Vital Signs Last 24 Hrs  T(C): 36.4 (16 Oct 2019 04:30), Max: 36.4 (15 Oct 2019 16:32)  T(F): 97.6 (16 Oct 2019 04:30), Max: 97.6 (16 Oct 2019 04:30)  HR: 109 (16 Oct 2019 07:52) (84 - 112)  BP: 118/73 (16 Oct 2019 04:30) (118/73 - 148/77)  BP(mean): --  RR: 19 (16 Oct 2019 04:30) (18 - 19)  SpO2: 97% (16 Oct 2019 07:52) (96% - 99%)    ----  I&O's Summary    15 Oct 2019 07:01  -  16 Oct 2019 07:00  --------------------------------------------------------  IN: 0 mL / OUT: 1850 mL / NET: -1850 mL        LABS:                        10.2   10.77 )-----------( 288      ( 16 Oct 2019 08:15 )             30.8     10-    139  |  111<H>  |  93<H>  ----------------------------<  69<L>  4.1   |  16<L>  |  3.30<H>    Ca    8.7      16 Oct 2019 08:15  Phos  5.7     10-16  Mg     2.2     10-16    TPro  6.3  /  Alb  2.0<L>  /  TBili  0.3  /  DBili  x   /  AST  63<H>  /  ALT  35  /  AlkPhos  141<H>  1015    PT/INR - ( 16 Oct 2019 08:15 )   PT: 80.8 sec;   INR: 6.68 ratio         PTT - ( 16 Oct 2019 08:15 )  PTT:47.6 sec  Urinalysis Basic - ( 15 Oct 2019 01:19 )    Color: Pale Yellow / Appearance: Turbid / S.015 / pH: x  Gluc: x / Ketone: Negative  / Bili: Negative / Urobili: Negative   Blood: x / Protein: 150 mg/dL / Nitrite: Negative   Leuk Esterase: Moderate / RBC: 6-10 /HPF / WBC 11-25   Sq Epi: x / Non Sq Epi: Occasional / Bacteria: Many      CAPILLARY BLOOD GLUCOSE          10-15 @ 09:11   >100,000 CFU/ml Gram Negative Rods  --  --  10-14 @ 22:58   No growth to date.  --  --
[facility] -- Alejandro Hernandez, Alvaro Oropeza Pannella, Patel, Savella  Office # [phone]      Follow Up:    AFib    Subjective/Observations:   Seen at bedside in NAD denies chest pain dizziness palpitations nausea vomiting fever or chills     REVIEW OF SYSTEMS: All other review of systems is negative unless indicated above    PAST MEDICAL & SURGICAL HISTORY:  Chronic kidney disease (CKD), stage IV (severe)  Prostate cancer: chronic vega  Hyperlipidemia  Hypertension  Atrial fibrillation  [place] (hard of hearing)  Bladder neoplasm  Congestive heart failure: hospitalized  once 12/2010  Acute congestive heart failure: 12/2010  Prostate cancer: 9/2011 - s/p external radiation treatments - last 12/9/11, s/p Bicalvutamide  Gout  Depression  Hypertension  Hyperlipidemia  Atrial Fibrillation: 1990  History of repair of hip fracture  Circumcision: age 5  History of Arthroscopy of Knee: left  S/P Cataract Extraction: LEFT  S/P Tonsillectomy      MEDICATIONS  (STANDING):  azithromycin  IVPB 500 milliGRAM(s) IV Intermittent every 24 hours  cefTRIAXone   IVPB 1000 milliGRAM(s) IV Intermittent every 24 hours  cholecalciferol 400 Unit(s) Oral daily  dextrose 5% + sodium chloride 0.45%. 1000 milliLiter(s) (50 mL/Hr) IV Continuous <Continuous>  influenza   Vaccine 0.5 milliLiter(s) IntraMuscular once  metoprolol succinate ER 50 milliGRAM(s) Oral two times a day    MEDICATIONS  (PRN):  morphine Concentrate 5 milliGRAM(s) SubLingual five times a day PRN pain and or dyspnea and or distress -      Allergies    Hay Fever (Rhinorrhea)  No Known Drug Allergies    Intolerances        Vital Signs Last 24 Hrs  T(C): 36.8 (17 Oct 2019 04:54), Max: 37 (16 Oct 2019 14:00)  T(F): 98.2 (17 Oct 2019 04:54), Max: 98.6 (16 Oct 2019 14:00)  HR: 116 (17 Oct 2019 07:54) (92 - 116)  BP: 141/68 (17 Oct 2019 04:54) (113/77 - 141/68)  BP(mean): --  RR: 17 (17 Oct 2019 04:54) (17 - 18)  SpO2: 99% (17 Oct 2019 07:54) (95% - 100%)    I&O's Summary    16 Oct 2019 07:01  -  17 Oct 2019 07:00  --------------------------------------------------------  IN: 450 mL / OUT: 1400 mL / NET: -950 mL          PHYSICAL EXAM:  TELE: Afib   Constitutional: NAD, awake and alert, well-developed  HEENT: Moist Mucous Membranes, Anicteric  Pulmonary: Non-labored, breath sounds are clear bilaterally, No wheezing, crackles or rhonchi  Cardiovascular: IRRegular, S1 and S2 nl, No murmurs, rubs, gallops or clicks  Gastrointestinal: Bowel Sounds present, soft, nontender.   Lymph: No lymphadenopathy. No peripheral edema.  Skin: No visible rashes or ulcers.  Psych:  Mood & affect appropriate    LABS: All Labs Reviewed:                        10.2   10.77 )-----------( 288      ( 16 Oct 2019 08:15 )             30.8                         11.2   21.72 )-----------( 308      ( 15 Oct 2019 04:05 )             33.3                         11.7   20.63 )-----------( 328      ( 14 Oct 2019 20:48 )             34.7     16 Oct 2019 08:15    139    |  111    |  93     ----------------------------<  69     4.1     |  16     |  3.30   15 Oct 2019 22:24    137    |  111    |  94     ----------------------------<  77     4.4     |  15     |  3.40   15 Oct 2019 04:05    131    |  102    |  105    ----------------------------<  114    5.0     |  16     |  4.10     Ca    8.7        16 Oct 2019 08:15  Ca    8.5        15 Oct 2019 22:24  Ca    8.2        15 Oct 2019 04:05  Phos  5.7       16 Oct 2019 08:15  Phos  5.5       15 Oct 2019 22:24  Phos  5.4       15 Oct 2019 04:05  Mg     2.2       16 Oct 2019 08:15  Mg     2.2       15 Oct 2019 22:24  Mg     2.1       15 Oct 2019 04:05    TPro  6.3    /  Alb  2.0    /  TBili  0.3    /  DBili  x      /  AST  63     /  ALT  35     /  AlkPhos  141    15 Oct 2019 04:05  TPro  6.5    /  Alb  2.1    /  TBili  0.3    /  DBili  x      /  AST  21     /  ALT  17     /  AlkPhos  131    14 Oct 2019 20:48    PT/INR - ( 16 Oct 2019 08:15 )   PT: 80.8 sec;   INR: 6.68 ratio         PTT - ( 16 Oct 2019 08:15 )  PTT:47.6 sec         ECG:  < from: 12 Lead ECG (10.14.19 @ 21:24) >    Ventricular Rate 97 BPM    Atrial Rate 122 BPM    QRS Duration 104 ms    Q-T Interval 360 ms    QTC Calculation(Bezet) 457 ms    R Axis -58 degrees    T Axis 77 degrees    Diagnosis Line Atrial fibrillation  Left anterior fascicular block  Nonspecific ST abnormality    < end of copied text >    Echo:    < from: TTE Echo Doppler w/o Cont (10.30.16 @ 10:33) >    Mild concentric LVH, with internal dimensions at the upper limits of   normal. Severe global dysfunction, estimated LVEF of 15%. All walls   appear hypokinetic with minor regional variation. The right ventricle   appears to be normal in size with mild systolic dysfunction. Left atrial   enlargement. The mitral valve appears structurally normal with 2+ MR.   Calcified aortic valve leaflets without stenosis. 1+ FL. 2-3+ TR.   Estimated PA systolic pressure is 65 mmHg, assuming an RA pressure of 10   mmHg. No significant pericardial effusion. Bilateral pleural effusions.   The IVC appears dilated.    < end of copied text >    Radiology:    < from: Xray Chest 1 View AP/PA (10.14.19 @ 21:55) >  Findings: Small layering bilateral pleural effusions are noted. Adjacent   atelectasis and/or pneumonia is notable on the left. The heart size is at   the upper limits of normal. Multilevel degenerative changes are noted   within the imaged potions of the spine.    IMPRESSION: Small bilateral pleural effusions with adjacent atelectasis   and/or pneumonia on the left.    < end of copied text >       Cardiology
[INTERVAL HX: ]  Patient seen and examined;  Chart reviewed and events noted;   wife at bedside, notes more alert.   Ate some food.   Vega with very cloudy urine, pus like.         Patient is a 89y Male with a known history of :  Pneumonia due to organism (J18.9) [Active]  Chronic kidney disease (CKD), stage IV (severe) (N18.4) [Active]  Prostate cancer (C61) [Active]  Hyperlipidemia (E78.5) [Active]  Hypertension (I10) [Active]  Atrial fibrillation (I48.91) [Active]  [place] (hard of hearing) (389.9) [Active]  Bladder neoplasm (239.4) [Active]  Congestive heart failure (428.0) [Active]  Acute congestive heart failure (428.0) [Active]  Prostate cancer (185) [Active]  Gout (274.9) [Active]  Depression (311) [Active]  BPH (Benign Prostatic Hypertrophy) (600.00) [Inactive]  Hypertension (401.9) [Active]  Hyperlipidemia (272.4) [Active]  Atrial Fibrillation (427.31) [Active]  History of repair of hip fracture (Z98.890) [Active]  Circumcision (V50.2) [Active]  History of Arthroscopy of Knee (V45.89) [Active]  S/P Cataract Extraction (V45.61) [Active]  S/P Tonsillectomy (V45.89) [Active]    HPI:  89 y/o M with PMHx of systolic/diastolic CHF (EF 38%-60% in 4/2018), atrial fibrillation (on warfarin), HTN, HLD, BPH, bladder neoplasm, prostate cancer s/p radiation, depression, gout presented to ED due to cough and no urine output for >24 hours. Patient is with chronic vega, changed one week ago. Was started on macrobid as outpatient (diagnosed with UTI over the phone per wife), had failed antibiotic therapy prior to this. Patient became more weak and "delirious" 2 days prior to admission with several episodes of diarrhea.  In the ED, urine in Vega bag was noted to be grossly pyuric. Multiple attempts made by nursing and ER physician to replace vega - US bladder without visualization of catheter and patient anxious/in discomfort. History obtained by wife due to patient's dementia. States that patient has not seen urology Dr. Brownlee for a while now.  Per wife, patient has been having cloudy urine on and off for 3 months without any treatment.      In the ED, VS significant for Tmax 100.3 (rectal), . Labs significant for: WBC 20.63, Na 127, BUN/Cr 115/4.6. CXR showed small bilateral effusions, ?left infiltrate vs atelectasis. US bladder did not visualize vega catheter - showed mildly distended urinary bladder. EKG showed atrial fibrillation 97 with LAFB. (14 Oct 2019 23:41)            MEDICATIONS  (STANDING):  azithromycin  IVPB 500 milliGRAM(s) IV Intermittent every 24 hours  cefTRIAXone   IVPB 1000 milliGRAM(s) IV Intermittent every 24 hours  cholecalciferol 400 Unit(s) Oral daily  dextrose 5% + sodium chloride 0.45%. 1000 milliLiter(s) (50 mL/Hr) IV Continuous <Continuous>  influenza   Vaccine 0.5 milliLiter(s) IntraMuscular once  metoprolol succinate ER 50 milliGRAM(s) Oral two times a day    MEDICATIONS  (PRN):  morphine Concentrate 5 milliGRAM(s) SubLingual five times a day PRN pain and or dyspnea and or distress -      Vital Signs Last 24 Hrs  T(C): 36.8 (17 Oct 2019 04:54), Max: 37 (16 Oct 2019 14:00)  T(F): 98.2 (17 Oct 2019 04:54), Max: 98.6 (16 Oct 2019 14:00)  HR: 116 (17 Oct 2019 07:54) (92 - 116)  BP: 141/68 (17 Oct 2019 04:54) (113/77 - 141/68)  BP(mean): --  RR: 17 (17 Oct 2019 04:54) (17 - 18)  SpO2: 99% (17 Oct 2019 07:54) (95% - 100%)      [PHYSICAL EXAM]  General: adult in NAD,  WN,  WD.  HEENT: clear oropharynx, anicteric sclera, pink conjunctivae.  Neck: supple, no masses.  CV: irreg S1S2, no murmur, no rubs, no gallops.  Lungs: clear to auscultation, no wheezes, no rales, no rhonchi.  Abdomen: soft, non-tender, non-distended, no hepatosplenomegaly, normal BS, no guarding.  Ext: no clubbing, no cyanosis, no edema.  Skin: no rashes,  no petechiae, no venous stasis changes.  Neuro: alert and oriented X1  , no focal motor deficits.  LN: no SC GRACE.      [LABS:]                        11.5   7.97  )-----------( 343      ( 17 Oct 2019 09:15 )             34.6     10-17    139  |  110<H>  |  78<H>  ----------------------------<  89  3.8   |  19<L>  |  2.80<H>    Ca    8.8      17 Oct 2019 09:15  Phos  5.7     10-16  Mg     2.2     10-16      PT/INR - ( 17 Oct 2019 09:15 )   PT: 50.1 sec;   INR: 4.24 ratio         PTT - ( 17 Oct 2019 09:15 )  PTT:45.2 sec              [RADIOLOGY STUDIES:]

## 2019-10-19 NOTE — DISCHARGE NOTE NURSING/CASE MANAGEMENT/SOCIAL WORK - PATIENT PORTAL LINK FT
You can access the FollowMyHealth Patient Portal offered by [facility] by registering at the following website: [email or web address]. By joining [employer]’s FollowMyHealth portal, you will also be able to view your health information using other applications (apps) compatible with our system.

## 2019-12-03 ENCOUNTER — RX RENEWAL (OUTPATIENT)
Age: 84
End: 2019-12-03

## 2019-12-03 RX ORDER — POTASSIUM CHLORIDE 600 MG/1
8 TABLET, FILM COATED, EXTENDED RELEASE ORAL
Qty: 45 | Refills: 3 | Status: ACTIVE | COMMUNITY
Start: 2019-12-03 | End: 1900-01-01

## 2019-12-10 ENCOUNTER — OTHER (OUTPATIENT)
Age: 84
End: 2019-12-10

## 2020-10-23 NOTE — PATIENT PROFILE ADULT - VISION (WITH CORRECTIVE LENSES IF THE PATIENT USUALLY WEARS THEM):
Additional Notes: patient is here to discuss sun spots. 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 he is good about sunscreen. we discussed fraxel 1927. He is on board. i also suggested maintenance with half face BBL and spot treats after three treatments. He states he is using a good skin care line that he shares with his wife. He doesn’t remember the name of what he uses. He wants to get on the schedule once he’s done with being in the sun, closer to [place].  He has a history of cold sores and states he is usually always prophylactic with acyclovir. Quote in chart.
Detail Level: Simple
Partially impaired: cannot see medication labels or newsprint, but can see obstacles in path, and the surrounding layout; can count fingers at arm's length

## 2021-02-05 NOTE — ED PROVIDER NOTE - EYES NEGATIVE STATEMENT, MLM
The results were negative  My medical assistant will call her to let her know the results  Statement Selected no discharge, no irritation, no pain, no redness, and no visual changes.

## 2022-06-16 NOTE — SWALLOW VFSS/MBS ASSESSMENT ADULT - LARYNGEAL PENETRATION DURING THE SWALLOW - COUGH
Yasmany Jones is a 75 year old male here today for his annual medicare wellness visit.       PCP- Outside Provider     Health Maintenance:  Health Maintenance Due   Topic Date Due   • Colorectal Cancer Screen-  Never done   • Shingles Vaccine (1 of 2) Never done   • DTaP/Tdap/Td Vaccine (1 - Tdap) 01/01/2011   • Pneumococcal Vaccine 65+ (1 - PCV) Never done   • Medicare Advantage- Medicare Wellness Visit  01/01/2022       Patient is due for topics as listed above but is not proceeding at this time.     Advance Directive: YES- needs to bring copy    Denies known Latex allergy or symptoms of Latex sensitivity.   Medications reviewed with patient.   Pharmacy verified.   Tobacco hx verified.     Cholesterol (mg/dL)   Date Value   05/06/2021 184     HDL (mg/dL)   Date Value   05/06/2021 45     Triglycerides (mg/dL)   Date Value   05/06/2021 85     LDL (mg/dL)   Date Value   05/06/2021 122     Glucose (mg/dL)   Date Value   05/06/2021 103 (H)                       Moderate/Mild/+residue

## 2024-06-12 NOTE — PROVIDER CONTACT NOTE (CRITICAL VALUE NOTIFICATION) - ACTION/TREATMENT ORDERED:
Patient received in the acute dialysis unit by baldomero. Observation dialysis started via left IJ tunneled CVC. Vitals stable. No distress noted  
Coumadin on Hold
Per Md no new orders at this time. Will keep an eye on pt.

## 2025-02-26 NOTE — PROGRESS NOTE ADULT - PROBLEM SELECTOR PLAN 6
ticagrelor (Brilinta) 90 mg tablet 1 tablet twice a day   90 day supply   He needs by Friday he is going out of town   [id number] - [place], [place] - [address]    management as above